# Patient Record
Sex: FEMALE | Race: WHITE | NOT HISPANIC OR LATINO | Employment: OTHER | URBAN - METROPOLITAN AREA
[De-identification: names, ages, dates, MRNs, and addresses within clinical notes are randomized per-mention and may not be internally consistent; named-entity substitution may affect disease eponyms.]

---

## 2018-07-09 ENCOUNTER — HOSPITAL ENCOUNTER (OUTPATIENT)
Dept: OTHER | Facility: HOSPITAL | Age: 48
Discharge: HOME | End: 2018-07-09
Attending: NURSE PRACTITIONER | Admitting: NURSE PRACTITIONER

## 2018-07-09 LAB
AMYLASE SERPL-CCNC: 61 U/L (ref 36–128)
CHOLEST SERPL-MCNC: 508 MG/DL
CHOLEST/HDLC SERPL: 6.7 {RATIO}
CONV LDL CHOLESTEROL DIRECT: 380 MG/DL (ref 0–100)
D DIMER PPP FEU-MCNC: 0.21 MG/L FEU (ref 0.17–0.59)
HDLC SERPL-MCNC: 75 MG/DL
LDLC/HDLC SERPL: 5 {RATIO}
LIPASE SERPL-CCNC: 31 U/L (ref 22–51)
LIPID INTERPRETATION: ABNORMAL
TRIGL SERPL-MCNC: 406 MG/DL
URATE SERPL-MCNC: 10.1 MG/DL (ref 2.6–8)
VLDLC SERPL CALC-MCNC: 52.5 MG/DL

## 2018-07-10 ENCOUNTER — HOSPITAL ENCOUNTER (OUTPATIENT)
Dept: CT IMAGING | Facility: HOSPITAL | Age: 48
Discharge: HOME | End: 2018-07-10
Attending: NURSE PRACTITIONER | Admitting: NURSE PRACTITIONER

## 2018-07-12 ENCOUNTER — HOSPITAL ENCOUNTER (OUTPATIENT)
Dept: CARDIOLOGY | Facility: HOSPITAL | Age: 48
Discharge: HOME | End: 2018-07-12
Attending: NURSE PRACTITIONER | Admitting: NURSE PRACTITIONER

## 2019-08-07 ENCOUNTER — TRANSCRIBE ORDERS (OUTPATIENT)
Dept: ADMINISTRATIVE | Facility: HOSPITAL | Age: 49
End: 2019-08-07

## 2019-08-07 DIAGNOSIS — Z12.31 VISIT FOR SCREENING MAMMOGRAM: Primary | ICD-10-CM

## 2019-11-07 PROBLEM — E11.9 TYPE 2 DIABETES MELLITUS (HCC): Status: ACTIVE | Noted: 2019-11-07

## 2019-11-07 PROBLEM — E03.9 HYPOTHYROIDISM: Status: ACTIVE | Noted: 2019-11-07

## 2019-11-07 PROBLEM — I10 HYPERTENSION: Status: ACTIVE | Noted: 2019-11-07

## 2021-05-24 ENCOUNTER — HOSPITAL ENCOUNTER (EMERGENCY)
Facility: HOSPITAL | Age: 51
Discharge: HOME | End: 2021-05-24
Attending: EMERGENCY MEDICINE | Admitting: EMERGENCY MEDICINE

## 2021-05-24 VITALS
TEMPERATURE: 98.1 F | BODY MASS INDEX: 36.88 KG/M2 | WEIGHT: 200.4 LBS | DIASTOLIC BLOOD PRESSURE: 85 MMHG | SYSTOLIC BLOOD PRESSURE: 148 MMHG | RESPIRATION RATE: 16 BRPM | HEART RATE: 87 BPM | OXYGEN SATURATION: 98 % | HEIGHT: 62 IN

## 2021-05-24 DIAGNOSIS — E86.0 DEHYDRATION: Primary | ICD-10-CM

## 2021-05-24 LAB
ALBUMIN SERPL-MCNC: 3.9 G/DL (ref 3.5–5.2)
ALBUMIN/GLOB SERPL: 1.1 G/DL
ALP SERPL-CCNC: 92 U/L (ref 39–117)
ALT SERPL W P-5'-P-CCNC: 18 U/L (ref 1–33)
ANION GAP SERPL CALCULATED.3IONS-SCNC: 13 MMOL/L (ref 5–15)
AST SERPL-CCNC: 14 U/L (ref 1–32)
BASOPHILS # BLD AUTO: 0.1 10*3/MM3 (ref 0–0.2)
BASOPHILS NFR BLD AUTO: 0.6 % (ref 0–1.5)
BILIRUB SERPL-MCNC: 0.3 MG/DL (ref 0–1.2)
BUN SERPL-MCNC: 13 MG/DL (ref 6–20)
BUN/CREAT SERPL: 26 (ref 7–25)
CALCIUM SPEC-SCNC: 9.5 MG/DL (ref 8.6–10.5)
CHLORIDE SERPL-SCNC: 98 MMOL/L (ref 98–107)
CO2 SERPL-SCNC: 26 MMOL/L (ref 22–29)
CREAT SERPL-MCNC: 0.5 MG/DL (ref 0.57–1)
DEPRECATED RDW RBC AUTO: 40.3 FL (ref 37–54)
EOSINOPHIL # BLD AUTO: 0.2 10*3/MM3 (ref 0–0.4)
EOSINOPHIL NFR BLD AUTO: 1.8 % (ref 0.3–6.2)
ERYTHROCYTE [DISTWIDTH] IN BLOOD BY AUTOMATED COUNT: 13.4 % (ref 12.3–15.4)
GFR SERPL CREATININE-BSD FRML MDRD: 130 ML/MIN/1.73
GLOBULIN UR ELPH-MCNC: 3.6 GM/DL
GLUCOSE SERPL-MCNC: 213 MG/DL (ref 65–99)
HCT VFR BLD AUTO: 43.7 % (ref 34–46.6)
HGB BLD-MCNC: 14.8 G/DL (ref 12–15.9)
LIPASE SERPL-CCNC: 28 U/L (ref 13–60)
LYMPHOCYTES # BLD AUTO: 3 10*3/MM3 (ref 0.7–3.1)
LYMPHOCYTES NFR BLD AUTO: 29.8 % (ref 19.6–45.3)
MAGNESIUM SERPL-MCNC: 1.7 MG/DL (ref 1.6–2.6)
MCH RBC QN AUTO: 29.4 PG (ref 26.6–33)
MCHC RBC AUTO-ENTMCNC: 33.9 G/DL (ref 31.5–35.7)
MCV RBC AUTO: 86.7 FL (ref 79–97)
MONOCYTES # BLD AUTO: 0.6 10*3/MM3 (ref 0.1–0.9)
MONOCYTES NFR BLD AUTO: 6 % (ref 5–12)
NEUTROPHILS NFR BLD AUTO: 6.3 10*3/MM3 (ref 1.7–7)
NEUTROPHILS NFR BLD AUTO: 61.8 % (ref 42.7–76)
NRBC BLD AUTO-RTO: 0.1 /100 WBC (ref 0–0.2)
PLATELET # BLD AUTO: 282 10*3/MM3 (ref 140–450)
PMV BLD AUTO: 8.5 FL (ref 6–12)
POTASSIUM SERPL-SCNC: 3.9 MMOL/L (ref 3.5–5.2)
PROT SERPL-MCNC: 7.5 G/DL (ref 6–8.5)
RBC # BLD AUTO: 5.05 10*6/MM3 (ref 3.77–5.28)
SODIUM SERPL-SCNC: 137 MMOL/L (ref 136–145)
TROPONIN T SERPL-MCNC: <0.01 NG/ML (ref 0–0.03)
WBC # BLD AUTO: 10.1 10*3/MM3 (ref 3.4–10.8)

## 2021-05-24 PROCEDURE — 93005 ELECTROCARDIOGRAM TRACING: CPT | Performed by: EMERGENCY MEDICINE

## 2021-05-24 PROCEDURE — 83735 ASSAY OF MAGNESIUM: CPT | Performed by: EMERGENCY MEDICINE

## 2021-05-24 PROCEDURE — 83690 ASSAY OF LIPASE: CPT | Performed by: EMERGENCY MEDICINE

## 2021-05-24 PROCEDURE — 85025 COMPLETE CBC W/AUTO DIFF WBC: CPT | Performed by: EMERGENCY MEDICINE

## 2021-05-24 PROCEDURE — 84484 ASSAY OF TROPONIN QUANT: CPT | Performed by: EMERGENCY MEDICINE

## 2021-05-24 PROCEDURE — 96374 THER/PROPH/DIAG INJ IV PUSH: CPT

## 2021-05-24 PROCEDURE — 80053 COMPREHEN METABOLIC PANEL: CPT | Performed by: EMERGENCY MEDICINE

## 2021-05-24 PROCEDURE — 99283 EMERGENCY DEPT VISIT LOW MDM: CPT

## 2021-05-24 PROCEDURE — 25010000002 ONDANSETRON PER 1 MG: Performed by: EMERGENCY MEDICINE

## 2021-05-24 RX ORDER — SODIUM CHLORIDE 0.9 % (FLUSH) 0.9 %
10 SYRINGE (ML) INJECTION AS NEEDED
Status: DISCONTINUED | OUTPATIENT
Start: 2021-05-24 | End: 2021-05-24 | Stop reason: HOSPADM

## 2021-05-24 RX ORDER — ONDANSETRON 2 MG/ML
4 INJECTION INTRAMUSCULAR; INTRAVENOUS ONCE
Status: COMPLETED | OUTPATIENT
Start: 2021-05-24 | End: 2021-05-24

## 2021-05-24 RX ADMIN — SODIUM CHLORIDE 1000 ML: 0.9 INJECTION, SOLUTION INTRAVENOUS at 16:21

## 2021-05-24 RX ADMIN — ONDANSETRON 4 MG: 2 INJECTION INTRAMUSCULAR; INTRAVENOUS at 16:22

## 2021-05-24 NOTE — DISCHARGE INSTRUCTIONS
Rest, drink plenty of fluids, follow-up with your doctor this week for recheck.  Return for vomiting, fever, shortness of breath, chest pain, abdominal pain or any other concerns

## 2021-05-24 NOTE — ED NOTES
"Pt c/o nausea and states she has not felt right since her covid shot. States \" I'm dehydrated\"     Jimena Hua RN  05/24/21 8919    "

## 2021-05-25 LAB — QT INTERVAL: 377 MS

## 2023-07-31 ENCOUNTER — HOSPITAL ENCOUNTER (INPATIENT)
Facility: HOSPITAL | Age: 53
LOS: 2 days | Discharge: HOME | DRG: 897 | End: 2023-08-02
Attending: EMERGENCY MEDICINE
Payer: MEDICARE

## 2023-07-31 ENCOUNTER — HOSPITAL ENCOUNTER (EMERGENCY)
Facility: HOSPITAL | Age: 53
Discharge: HOME | DRG: 897 | End: 2023-07-31
Attending: EMERGENCY MEDICINE | Admitting: EMERGENCY MEDICINE
Payer: MEDICARE

## 2023-07-31 ENCOUNTER — APPOINTMENT (OUTPATIENT)
Dept: CT IMAGING | Facility: HOSPITAL | Age: 53
DRG: 897 | End: 2023-07-31
Payer: MEDICARE

## 2023-07-31 VITALS
HEIGHT: 62 IN | OXYGEN SATURATION: 94 % | DIASTOLIC BLOOD PRESSURE: 97 MMHG | HEART RATE: 88 BPM | BODY MASS INDEX: 36.65 KG/M2 | RESPIRATION RATE: 18 BRPM | TEMPERATURE: 98.9 F | SYSTOLIC BLOOD PRESSURE: 160 MMHG

## 2023-07-31 DIAGNOSIS — R41.82 ALTERED MENTAL STATUS, UNSPECIFIED ALTERED MENTAL STATUS TYPE: Primary | ICD-10-CM

## 2023-07-31 DIAGNOSIS — F15.10 METHAMPHETAMINE ABUSE: Primary | ICD-10-CM

## 2023-07-31 DIAGNOSIS — F22 PARANOID BEHAVIOR: ICD-10-CM

## 2023-07-31 DIAGNOSIS — F15.10 METHAMPHETAMINE ABUSE: ICD-10-CM

## 2023-07-31 DIAGNOSIS — R41.82 ALTERED MENTAL STATUS, UNSPECIFIED ALTERED MENTAL STATUS TYPE: ICD-10-CM

## 2023-07-31 PROBLEM — R41.0 DELIRIUM: Status: ACTIVE | Noted: 2023-07-31

## 2023-07-31 LAB
ALBUMIN SERPL-MCNC: 4.4 G/DL (ref 3.5–5.2)
ALBUMIN SERPL-MCNC: 4.6 G/DL (ref 3.5–5.2)
ALBUMIN/GLOB SERPL: 1.4 G/DL
ALBUMIN/GLOB SERPL: 1.4 G/DL
ALP SERPL-CCNC: 72 U/L (ref 39–117)
ALP SERPL-CCNC: 74 U/L (ref 39–117)
ALT SERPL W P-5'-P-CCNC: 13 U/L (ref 1–33)
ALT SERPL W P-5'-P-CCNC: 14 U/L (ref 1–33)
AMPHET+METHAMPHET UR QL: POSITIVE
ANION GAP SERPL CALCULATED.3IONS-SCNC: 11 MMOL/L (ref 5–15)
ANION GAP SERPL CALCULATED.3IONS-SCNC: 14 MMOL/L (ref 5–15)
ANION GAP SERPL CALCULATED.3IONS-SCNC: 14 MMOL/L (ref 5–15)
APAP SERPL-MCNC: <5 MCG/ML (ref 0–30)
AST SERPL-CCNC: 14 U/L (ref 1–32)
AST SERPL-CCNC: 16 U/L (ref 1–32)
BACTERIA UR QL AUTO: ABNORMAL /HPF
BARBITURATES UR QL SCN: NEGATIVE
BASOPHILS # BLD AUTO: 0.1 10*3/MM3 (ref 0–0.2)
BASOPHILS # BLD AUTO: 0.2 10*3/MM3 (ref 0–0.2)
BASOPHILS NFR BLD AUTO: 0.5 % (ref 0–1.5)
BASOPHILS NFR BLD AUTO: 1.4 % (ref 0–1.5)
BENZODIAZ UR QL SCN: NEGATIVE
BILIRUB SERPL-MCNC: 0.5 MG/DL (ref 0–1.2)
BILIRUB SERPL-MCNC: 0.7 MG/DL (ref 0–1.2)
BILIRUB UR QL STRIP: NEGATIVE
BUN SERPL-MCNC: 14 MG/DL (ref 6–20)
BUN SERPL-MCNC: 16 MG/DL (ref 6–20)
BUN SERPL-MCNC: 19 MG/DL (ref 6–20)
BUN/CREAT SERPL: 24.2 (ref 7–25)
BUN/CREAT SERPL: 25 (ref 7–25)
BUN/CREAT SERPL: 25.7 (ref 7–25)
CALCIUM SPEC-SCNC: 8.5 MG/DL (ref 8.6–10.5)
CALCIUM SPEC-SCNC: 9.1 MG/DL (ref 8.6–10.5)
CALCIUM SPEC-SCNC: 9.3 MG/DL (ref 8.6–10.5)
CANNABINOIDS SERPL QL: NEGATIVE
CHLORIDE SERPL-SCNC: 101 MMOL/L (ref 98–107)
CHLORIDE SERPL-SCNC: 102 MMOL/L (ref 98–107)
CHLORIDE SERPL-SCNC: 104 MMOL/L (ref 98–107)
CLARITY UR: CLEAR
CO2 SERPL-SCNC: 24 MMOL/L (ref 22–29)
COCAINE UR QL: NEGATIVE
COLOR UR: ABNORMAL
CREAT SERPL-MCNC: 0.56 MG/DL (ref 0.57–1)
CREAT SERPL-MCNC: 0.66 MG/DL (ref 0.57–1)
CREAT SERPL-MCNC: 0.74 MG/DL (ref 0.57–1)
DEPRECATED RDW RBC AUTO: 42.4 FL (ref 37–54)
DEPRECATED RDW RBC AUTO: 42.9 FL (ref 37–54)
EGFRCR SERPLBLD CKD-EPI 2021: 103.7 ML/MIN/1.73
EGFRCR SERPLBLD CKD-EPI 2021: 107.9 ML/MIN/1.73
EGFRCR SERPLBLD CKD-EPI 2021: 95.7 ML/MIN/1.73
EOSINOPHIL # BLD AUTO: 0.1 10*3/MM3 (ref 0–0.4)
EOSINOPHIL # BLD AUTO: 0.1 10*3/MM3 (ref 0–0.4)
EOSINOPHIL NFR BLD AUTO: 0.5 % (ref 0.3–6.2)
EOSINOPHIL NFR BLD AUTO: 0.9 % (ref 0.3–6.2)
ERYTHROCYTE [DISTWIDTH] IN BLOOD BY AUTOMATED COUNT: 13.7 % (ref 12.3–15.4)
ERYTHROCYTE [DISTWIDTH] IN BLOOD BY AUTOMATED COUNT: 13.8 % (ref 12.3–15.4)
ETHANOL UR QL: <0.01 %
GLOBULIN UR ELPH-MCNC: 3.2 GM/DL
GLOBULIN UR ELPH-MCNC: 3.2 GM/DL
GLUCOSE SERPL-MCNC: 165 MG/DL (ref 65–99)
GLUCOSE SERPL-MCNC: 173 MG/DL (ref 65–99)
GLUCOSE SERPL-MCNC: 239 MG/DL (ref 65–99)
GLUCOSE UR STRIP-MCNC: ABNORMAL MG/DL
HCT VFR BLD AUTO: 41.4 % (ref 34–46.6)
HCT VFR BLD AUTO: 42.2 % (ref 34–46.6)
HGB BLD-MCNC: 13.9 G/DL (ref 12–15.9)
HGB BLD-MCNC: 13.9 G/DL (ref 12–15.9)
HGB UR QL STRIP.AUTO: NEGATIVE
HOLD SPECIMEN: NORMAL
HOLD SPECIMEN: NORMAL
HYALINE CASTS UR QL AUTO: ABNORMAL /LPF
KETONES UR QL STRIP: ABNORMAL
LEUKOCYTE ESTERASE UR QL STRIP.AUTO: ABNORMAL
LYMPHOCYTES # BLD AUTO: 4.2 10*3/MM3 (ref 0.7–3.1)
LYMPHOCYTES # BLD AUTO: 4.3 10*3/MM3 (ref 0.7–3.1)
LYMPHOCYTES NFR BLD AUTO: 37 % (ref 19.6–45.3)
LYMPHOCYTES NFR BLD AUTO: 39.5 % (ref 19.6–45.3)
MAGNESIUM SERPL-MCNC: 1.9 MG/DL (ref 1.6–2.6)
MAGNESIUM SERPL-MCNC: 1.9 MG/DL (ref 1.6–2.6)
MCH RBC QN AUTO: 29.7 PG (ref 26.6–33)
MCH RBC QN AUTO: 29.8 PG (ref 26.6–33)
MCHC RBC AUTO-ENTMCNC: 33 G/DL (ref 31.5–35.7)
MCHC RBC AUTO-ENTMCNC: 33.5 G/DL (ref 31.5–35.7)
MCV RBC AUTO: 88.6 FL (ref 79–97)
MCV RBC AUTO: 90.2 FL (ref 79–97)
METHADONE UR QL SCN: NEGATIVE
MONOCYTES # BLD AUTO: 0.8 10*3/MM3 (ref 0.1–0.9)
MONOCYTES # BLD AUTO: 0.8 10*3/MM3 (ref 0.1–0.9)
MONOCYTES NFR BLD AUTO: 6.7 % (ref 5–12)
MONOCYTES NFR BLD AUTO: 7.2 % (ref 5–12)
NEUTROPHILS NFR BLD AUTO: 5.6 10*3/MM3 (ref 1.7–7)
NEUTROPHILS NFR BLD AUTO: 51 % (ref 42.7–76)
NEUTROPHILS NFR BLD AUTO: 55.3 % (ref 42.7–76)
NEUTROPHILS NFR BLD AUTO: 6.2 10*3/MM3 (ref 1.7–7)
NITRITE UR QL STRIP: NEGATIVE
NRBC BLD AUTO-RTO: 0 /100 WBC (ref 0–0.2)
NRBC BLD AUTO-RTO: 0.2 /100 WBC (ref 0–0.2)
NT-PROBNP SERPL-MCNC: 64 PG/ML (ref 0–900)
OPIATES UR QL: NEGATIVE
OXYCODONE UR QL SCN: NEGATIVE
PH UR STRIP.AUTO: <=5 [PH] (ref 5–8)
PHOSPHATE SERPL-MCNC: 3.5 MG/DL (ref 2.5–4.5)
PLAT MORPH BLD: NORMAL
PLATELET # BLD AUTO: 245 10*3/MM3 (ref 140–450)
PLATELET # BLD AUTO: 284 10*3/MM3 (ref 140–450)
PMV BLD AUTO: 8.9 FL (ref 6–12)
PMV BLD AUTO: 9.3 FL (ref 6–12)
POTASSIUM SERPL-SCNC: 3.6 MMOL/L (ref 3.5–5.2)
POTASSIUM SERPL-SCNC: 3.7 MMOL/L (ref 3.5–5.2)
POTASSIUM SERPL-SCNC: 3.7 MMOL/L (ref 3.5–5.2)
PROT SERPL-MCNC: 7.6 G/DL (ref 6–8.5)
PROT SERPL-MCNC: 7.8 G/DL (ref 6–8.5)
PROT UR QL STRIP: ABNORMAL
RBC # BLD AUTO: 4.67 10*6/MM3 (ref 3.77–5.28)
RBC # BLD AUTO: 4.68 10*6/MM3 (ref 3.77–5.28)
RBC # UR STRIP: ABNORMAL /HPF
RBC MORPH BLD: NORMAL
REF LAB TEST METHOD: ABNORMAL
SALICYLATES SERPL-MCNC: <0.3 MG/DL
SODIUM SERPL-SCNC: 139 MMOL/L (ref 136–145)
SODIUM SERPL-SCNC: 139 MMOL/L (ref 136–145)
SODIUM SERPL-SCNC: 140 MMOL/L (ref 136–145)
SP GR UR STRIP: 1.03 (ref 1–1.03)
SQUAMOUS #/AREA URNS HPF: ABNORMAL /HPF
TSH SERPL DL<=0.05 MIU/L-ACNC: 30.32 UIU/ML (ref 0.27–4.2)
UROBILINOGEN UR QL STRIP: ABNORMAL
WBC # UR STRIP: ABNORMAL /HPF
WBC MORPH BLD: NORMAL
WBC NRBC COR # BLD: 11 10*3/MM3 (ref 3.4–10.8)
WBC NRBC COR # BLD: 11.2 10*3/MM3 (ref 3.4–10.8)
WHOLE BLOOD HOLD COAG: NORMAL
WHOLE BLOOD HOLD SPECIMEN: NORMAL

## 2023-07-31 PROCEDURE — 85007 BL SMEAR W/DIFF WBC COUNT: CPT | Performed by: PHYSICIAN ASSISTANT

## 2023-07-31 PROCEDURE — 83735 ASSAY OF MAGNESIUM: CPT | Performed by: INTERNAL MEDICINE

## 2023-07-31 PROCEDURE — 82077 ASSAY SPEC XCP UR&BREATH IA: CPT | Performed by: EMERGENCY MEDICINE

## 2023-07-31 PROCEDURE — 80307 DRUG TEST PRSMV CHEM ANLYZR: CPT | Performed by: EMERGENCY MEDICINE

## 2023-07-31 PROCEDURE — 85025 COMPLETE CBC W/AUTO DIFF WBC: CPT | Performed by: PHYSICIAN ASSISTANT

## 2023-07-31 PROCEDURE — 84100 ASSAY OF PHOSPHORUS: CPT | Performed by: INTERNAL MEDICINE

## 2023-07-31 PROCEDURE — 85025 COMPLETE CBC W/AUTO DIFF WBC: CPT | Performed by: EMERGENCY MEDICINE

## 2023-07-31 PROCEDURE — 83880 ASSAY OF NATRIURETIC PEPTIDE: CPT | Performed by: INTERNAL MEDICINE

## 2023-07-31 PROCEDURE — 70450 CT HEAD/BRAIN W/O DYE: CPT

## 2023-07-31 PROCEDURE — 84443 ASSAY THYROID STIM HORMONE: CPT | Performed by: PHYSICIAN ASSISTANT

## 2023-07-31 PROCEDURE — 81001 URINALYSIS AUTO W/SCOPE: CPT | Performed by: EMERGENCY MEDICINE

## 2023-07-31 PROCEDURE — 99284 EMERGENCY DEPT VISIT MOD MDM: CPT

## 2023-07-31 PROCEDURE — 99285 EMERGENCY DEPT VISIT HI MDM: CPT

## 2023-07-31 PROCEDURE — 80053 COMPREHEN METABOLIC PANEL: CPT | Performed by: EMERGENCY MEDICINE

## 2023-07-31 PROCEDURE — 80143 DRUG ASSAY ACETAMINOPHEN: CPT | Performed by: EMERGENCY MEDICINE

## 2023-07-31 PROCEDURE — 80179 DRUG ASSAY SALICYLATE: CPT | Performed by: EMERGENCY MEDICINE

## 2023-07-31 PROCEDURE — 83735 ASSAY OF MAGNESIUM: CPT | Performed by: EMERGENCY MEDICINE

## 2023-07-31 RX ORDER — NITROGLYCERIN 0.4 MG/1
0.4 TABLET SUBLINGUAL
Status: DISCONTINUED | OUTPATIENT
Start: 2023-07-31 | End: 2023-08-02 | Stop reason: HOSPADM

## 2023-07-31 RX ORDER — SODIUM CHLORIDE 0.9 % (FLUSH) 0.9 %
10 SYRINGE (ML) INJECTION AS NEEDED
Status: DISCONTINUED | OUTPATIENT
Start: 2023-07-31 | End: 2023-07-31 | Stop reason: HOSPADM

## 2023-07-31 RX ORDER — SODIUM CHLORIDE 9 MG/ML
100 INJECTION, SOLUTION INTRAVENOUS CONTINUOUS
Status: DISPENSED | OUTPATIENT
Start: 2023-07-31 | End: 2023-08-01

## 2023-07-31 RX ORDER — POLYETHYLENE GLYCOL 3350 17 G/17G
17 POWDER, FOR SOLUTION ORAL DAILY PRN
Status: DISCONTINUED | OUTPATIENT
Start: 2023-07-31 | End: 2023-08-02 | Stop reason: HOSPADM

## 2023-07-31 RX ORDER — BISACODYL 10 MG
10 SUPPOSITORY, RECTAL RECTAL DAILY PRN
Status: DISCONTINUED | OUTPATIENT
Start: 2023-07-31 | End: 2023-08-02 | Stop reason: HOSPADM

## 2023-07-31 RX ORDER — DIAZEPAM 5 MG/1
5 TABLET ORAL ONCE
Status: DISCONTINUED | OUTPATIENT
Start: 2023-07-31 | End: 2023-07-31

## 2023-07-31 RX ORDER — AMOXICILLIN 250 MG
2 CAPSULE ORAL 2 TIMES DAILY
Status: DISCONTINUED | OUTPATIENT
Start: 2023-07-31 | End: 2023-08-02 | Stop reason: HOSPADM

## 2023-07-31 RX ORDER — SODIUM CHLORIDE 0.9 % (FLUSH) 0.9 %
10 SYRINGE (ML) INJECTION AS NEEDED
Status: DISCONTINUED | OUTPATIENT
Start: 2023-07-31 | End: 2023-08-02 | Stop reason: HOSPADM

## 2023-07-31 RX ORDER — HYDROCODONE BITARTRATE AND ACETAMINOPHEN 5; 325 MG/1; MG/1
1 TABLET ORAL EVERY 4 HOURS PRN
Status: DISCONTINUED | OUTPATIENT
Start: 2023-07-31 | End: 2023-08-02 | Stop reason: HOSPADM

## 2023-07-31 RX ORDER — BISACODYL 5 MG/1
5 TABLET, DELAYED RELEASE ORAL DAILY PRN
Status: DISCONTINUED | OUTPATIENT
Start: 2023-07-31 | End: 2023-08-02 | Stop reason: HOSPADM

## 2023-07-31 RX ORDER — SODIUM CHLORIDE 0.9 % (FLUSH) 0.9 %
10 SYRINGE (ML) INJECTION EVERY 12 HOURS SCHEDULED
Status: DISCONTINUED | OUTPATIENT
Start: 2023-07-31 | End: 2023-08-02 | Stop reason: HOSPADM

## 2023-07-31 RX ORDER — LEVOTHYROXINE SODIUM 0.05 MG/1
50 TABLET ORAL
Status: DISCONTINUED | OUTPATIENT
Start: 2023-08-01 | End: 2023-08-02 | Stop reason: HOSPADM

## 2023-07-31 RX ORDER — PANTOPRAZOLE SODIUM 40 MG/1
40 TABLET, DELAYED RELEASE ORAL
Status: DISCONTINUED | OUTPATIENT
Start: 2023-08-01 | End: 2023-08-02

## 2023-07-31 RX ORDER — RISPERIDONE 0.25 MG/1
0.5 TABLET ORAL EVERY 12 HOURS SCHEDULED
Status: DISCONTINUED | OUTPATIENT
Start: 2023-07-31 | End: 2023-08-01

## 2023-07-31 RX ORDER — SODIUM CHLORIDE 9 MG/ML
40 INJECTION, SOLUTION INTRAVENOUS AS NEEDED
Status: DISCONTINUED | OUTPATIENT
Start: 2023-07-31 | End: 2023-08-02 | Stop reason: HOSPADM

## 2023-07-31 RX ADMIN — SODIUM CHLORIDE 100 ML/HR: 9 INJECTION, SOLUTION INTRAVENOUS at 20:42

## 2023-07-31 RX ADMIN — SODIUM CHLORIDE 1000 ML: 9 INJECTION, SOLUTION INTRAVENOUS at 16:06

## 2023-07-31 NOTE — ED NOTES
"Secondary nurse attempted to hook patient up to cardiac monitor. Patient not willing to cooperate. When nurse went to place pulse oxy patient yelled at nurse \"don't fucking touch me!\" Nurse educated patient that we need to place her on the monitor and get some vitals on her for triage, Patient started to get upset stating \"I'm not fucking stupid, ya'll think I am but I'm not! You can't even tell me what I'm doing here\" When nurse restated how she was brought back to ER and the physical condition she was in, patient called nurse a liar. ER tech called to bedside for assistance in getting vitals.  "

## 2023-07-31 NOTE — ED NOTES
Provider at bedside and reports patient can  have a drink, patient reports she would not like a fountain drink and wants a drink that she can open herself. Patient given a can of umair but patient was unable to open it herself. RN offered to help patient open can but patient declined the help.

## 2023-07-31 NOTE — ED NOTES
"Patient asking specifically for a \"can of soda\" nurse states that she can get up a cup of soda. Patient started crying stating \"forget it!, I don't want it in a cup\" nurse concerned about patient safety with having a can. She denies any SI/HI but seems emotionally unstable at this time. Patient offered several different beverages but patient only wants a can of coke.  "

## 2023-07-31 NOTE — ED NOTES
RN at bedside, patient sleeping when RN entered room. Patient reported pain in right foot, walking barefoot a lot and stepping on nails. Patient reports she has a history of diabetes. Patient reports she has  a headache. Patient given socks for feet, patient given a clean pillow. RN attempted to start IV access in patient's left arm. Patient agreed to obtain blood samples. Another RN at bedside to help patient hold her arm still. RN able to obtain blood sample but IV unable to thread in to vein. Site dressed with gauze. Patient tearful and had been moving arm erratically during IV insertion attempt. Patient offered emotional support.

## 2023-07-31 NOTE — Clinical Note
Level of Care: Med/Surg [1]   Admitting Physician: ALFONSO SANCHEZ [[id number]]   Attending Physician: ALFONSO SANCHEZ [[id number]]

## 2023-07-31 NOTE — DISCHARGE INSTRUCTIONS
Tylenol as needed for pain or headache.    Stop using methamphetamines  Drink plenty of fluids    Follow-up with primary care for recheck    Return to the ER for new or worsening symptoms

## 2023-07-31 NOTE — ED PROVIDER NOTES
"Subjective   History of Present Illness  Chief Complaint: Altered mental status    Patient is a 53-year-old  female history of methamphetamine use presents to the ER acutely confused.  Patient was seen earlier today for altered mental status, paranoia thought to be secondary to methamphetamine use but was discharged.  Patient was seen by this provider at that time, she at that time she was alert and oriented x4, answer questions appropriately, respond to verbal commands and was able to ambulate with non-ataxic gait.  Patient was discharged completely clothed, but was then found a few hours later in the surgical department with only a towel wrapped around her otherwise completely naked.  When asked why the patient was in the surgical department she states \"I do not know\".  When asked what she was doing she states \"I did not do what you accused me of doing\".  When asked if she left the hospital before finding her way to the surgical department she states \"what's it to you I do not need to tell you that\".  Patient then crosses her arms sits on the exam stretcher and does not look at me.  She does not complain of any pain.  She is in no acute respiratory distress.    PCP: John Martino    History provided by:  Patient    Review of Systems   Constitutional:  Negative for chills and fever.   HENT:  Negative for sore throat and trouble swallowing.    Eyes: Negative.    Respiratory:  Negative for shortness of breath and wheezing.    Cardiovascular:  Negative for chest pain.   Gastrointestinal:  Negative for abdominal pain, diarrhea, nausea and vomiting.   Endocrine: Negative.    Genitourinary:  Negative for dysuria.   Musculoskeletal:  Negative for myalgias.   Skin:  Negative for rash.   Allergic/Immunologic: Negative.    Neurological:  Negative for headaches.   Psychiatric/Behavioral:  Positive for agitation and confusion. Negative for behavioral problems.    All other systems reviewed and are negative.    No past " medical history on file.    Allergies   Allergen Reactions    Cephalexin Unknown (See Comments)    Ibuprofen Unknown (See Comments)       No past surgical history on file.    No family history on file.    Social History     Socioeconomic History    Marital status:            Objective   Physical Exam  Vitals and nursing note reviewed.   Constitutional:       Appearance: Normal appearance. She is well-developed and normal weight. She is not ill-appearing or toxic-appearing.   HENT:      Head: Normocephalic and atraumatic.      Mouth/Throat:      Mouth: Mucous membranes are moist.   Eyes:      Extraocular Movements: Extraocular movements intact.      Right eye: Normal extraocular motion and no nystagmus.      Left eye: Normal extraocular motion and no nystagmus.      Pupils: Pupils are equal, round, and reactive to light.   Cardiovascular:      Rate and Rhythm: Normal rate and regular rhythm.      Heart sounds: Normal heart sounds.   Pulmonary:      Effort: Pulmonary effort is normal. No respiratory distress.      Breath sounds: Normal breath sounds. No wheezing.   Abdominal:      General: Bowel sounds are normal. There is no distension.      Palpations: Abdomen is soft.      Tenderness: There is no abdominal tenderness.   Musculoskeletal:         General: Normal range of motion.   Skin:     General: Skin is warm and dry.      Capillary Refill: Capillary refill takes less than 2 seconds.      Findings: No rash.   Neurological:      Mental Status: She is alert and oriented to person, place, and time. She is confused.      GCS: GCS eye subscore is 4. GCS verbal subscore is 5. GCS motor subscore is 6.      Cranial Nerves: No cranial nerve deficit or facial asymmetry.   Psychiatric:         Attention and Perception: She does not perceive auditory hallucinations.         Mood and Affect: Mood normal.         Speech: Speech normal.         Behavior: Behavior normal. Behavior is agitated.         Thought Content:  "Thought content is paranoid.       Procedures           ED Course  ED Course as of 07/31/23 1805 Mon Jul 31, 2023   1251 Patient was brought back to the emergency room after being found wandering the surgical hallways only in a towel.  When asked her what happened patient became agitated and stated that \"she did not do what we accused her of doing\".  I asked the patient why she went to the surgical wing and she states \"I do not know\".  I asked the patient if she left the hospital first before returning to the hospital and she states \"that is none of your business\". []   1511 I spoke with Dr. Meyer regarding admission. He requested Geodon IM ordered. Order placede []      ED Course User Index  [] Jimena Whitehead PA    /78   Pulse 97   Temp 98.9 øF (37.2 øC)   Resp 20   Ht 157.5 cm (62\")   Wt 90.9 kg (200 lb 6.4 oz)   SpO2 97%   BMI 36.65 kg/mý   Labs Reviewed   COMPREHENSIVE METABOLIC PANEL - Abnormal; Notable for the following components:       Result Value    Glucose 173 (*)     All other components within normal limits    Narrative:     GFR Normal >60  Chronic Kidney Disease <60  Kidney Failure <15     CBC WITH AUTO DIFFERENTIAL - Abnormal; Notable for the following components:    WBC 11.00 (*)     Lymphocytes, Absolute 4.30 (*)     All other components within normal limits   TSH - Abnormal; Notable for the following components:    TSH 30.320 (*)     All other components within normal limits   SCAN SLIDE - Normal    Narrative:     Slide Reviewed     CBC AND DIFFERENTIAL    Narrative:     The following orders were created for panel order CBC & Differential.  Procedure                               Abnormality         Status                     ---------                               -----------         ------                     CBC Auto Differential[[id number]]        Abnormal            Final result               Scan Slide[[id number]]                   Normal              Final result       "           Please view results for these tests on the individual orders.     Medications   sodium chloride 0.9 % flush 10 mL (has no administration in time range)   ziprasidone (GEODON) 10 mg in sterile water (preservative free) 0.5 mL injection (has no administration in time range)   sodium chloride 0.9 % bolus 1,000 mL (0 mL Intravenous Stopped 7/31/23 1735)     CT Head Without Contrast    Result Date: 7/31/2023  1.No acute intracranial abnormality is identified. 2.Periventricular white matter hypoattenuation may be related to chronic microvascular ischemic change. This is greater than expected given the patient's age. Follow-up MRI may be considered if clinically indicated. Electronically Signed: Timothy Rodrigeuz  7/31/2023 7:58 AM EDT  Workstation ID: [id number]                                          Medical Decision Making  Differential Dx (Includes but not limited to): Schizophrenia, paranoia, intoxication, methamphetamine abuse, psychosis  Medical Records Reviewed: Patient seen earlier today for altered mental status, paranoia, discharged by this provider because she was alert and oriented x4 and requested discharge.  Labs: CBC no leukocytosis.  CMP glucose 173.  TSH 30  Imaging: Reviewed from earlier, CT head shows no acute intracranial hemorrhage or tumor  Telemetry: N/A  Testing considered but not ordered: Chest x-ray, no cough or shortness of breath  Nature of Complaint: Acute  Admission vs Discharge: Admission  Discussion: While in the ED IV was placed and labs were obtained appropriate PPE was worn during exam and throughout all encounters with the patient.  Patient had the above evaluation.  IV established, lab work obtained.  Patient is awake, alert, answers questions appropriately responds to verbal commands.  She is ambulatory with non-ataxic gait.  Patient does seem very paranoid, she will not drink any fluids from the start from cup because she believes that it has been poisoned.  She denies any  complaints of pain.  Patient will be admitted for altered mental status, paranoia.  I spoke with Dr. Sanchez who agreed to accept patient for admission and psychiatric consultation, requested order for Geodon.  I suspect patient's altered mental status and paranoia secondary to methamphetamine use.  Patient otherwise unremarkable ER stay.    Problems Addressed:  Altered mental status, unspecified altered mental status type: acute illness or injury  Methamphetamine abuse: acute illness or injury  Paranoid behavior: acute illness or injury    Amount and/or Complexity of Data Reviewed  External Data Reviewed: radiology and notes.  Labs: ordered. Decision-making details documented in ED Course.    Risk  Prescription drug management.  Decision regarding hospitalization.        Final diagnoses:   Altered mental status, unspecified altered mental status type   Methamphetamine abuse   Paranoid behavior       ED Disposition  ED Disposition       ED Disposition   Decision to Admit    Condition   --    Comment   Level of Care: Med/Surg [1]   Admitting Physician: ALFONSO SANCHEZ [[id number]]   Attending Physician: ALFONSO SANCHEZ [[id number]]                 No follow-up provider specified.       Medication List      No changes were made to your prescriptions during this visit.            Jimena Whitehead PA  07/31/23 2754

## 2023-07-31 NOTE — ED NOTES
Patient started coughing, RN entered room to assess patient. Patient continued to cough, bed sat up. Patient stopped coughing and RN listened to lung sounds which were clear and noted oxygen saturations to be 95% on room air. Patient was tearful and said 'everyone is messing with me' and reported 'i'm not messing with anyone and I don't want anyone messing with me.' Emotional support provided to patient and asked about needs. Patient tearful and reports being hungry and thirsty. RN offered can of soda but patient reported 'i just want to be left alone.' RN left room, patient on monitor and door open. Lights dimmed in room

## 2023-07-31 NOTE — ED PROVIDER NOTES
"Subjective   History of Present Illness  Chief complaint: Patient presents after EMS was called as patient was at a gas station accusing them of poisoning slushafia's that her brother was giving her.  She states that and admits to taking methamphetamine.  She states that she swallows it.  She states she swallowed \"3 pieces over the last day\".  She states she does methamphetamine frequently to take her mind off \"bad memories of my father\".  She denies suicidal ideation or homicidal ideation.  She states she is \"thirsty\".    Context:    Duration: As above    Timing:    Severity:    Associated Symptoms:        PCP:  LMP:    Review of Systems   Unable to perform ROS: Mental status change   Psychiatric/Behavioral:  Positive for agitation, behavioral problems, decreased concentration and hallucinations. Negative for self-injury and suicidal ideas.      No past medical history on file.    Allergies   Allergen Reactions    Cephalexin Unknown (See Comments)    Ibuprofen Unknown (See Comments)       No past surgical history on file.    No family history on file.    Social History     Socioeconomic History    Marital status:            Objective   Physical Exam  Vitals and nursing note reviewed.   Constitutional:       Comments: Patient is anxious and pressured and rapid with her speech   HENT:      Head: Normocephalic and atraumatic.   Cardiovascular:      Rate and Rhythm: Tachycardia present.      Pulses: Normal pulses.      Heart sounds: Normal heart sounds.   Pulmonary:      Effort: Pulmonary effort is normal.      Breath sounds: Normal breath sounds.   Abdominal:      Tenderness: There is no abdominal tenderness.   Musculoskeletal:         General: No swelling.      Cervical back: Normal range of motion and neck supple.   Neurological:      General: No focal deficit present.      Mental Status: She is alert.      Comments: Patient has some disorientation to situation and some fixed delusions but is oriented to time " What Type Of Note Output Would You Prefer (Optional)?: Bullet Format Hpi Title: Evaluation of Skin Lesions and place   Psychiatric:      Comments: Patient is tearful.  She is not suicidal.  She is not homicidal.  She is anxious.       Procedures           ED Course  ED Course as of 08/01/23 0817   Mon Jul 31, 2023   0751 Patient care assumed from Dr. Oglesby pending CT scan and an expected admission []   1036 Patient reevaluated, she is resting in the exam room.  Vital signs are stable.  Patient easily arousable and answers questions appropriately.  Her speech is clear.  Patient able to tell me her name, date of birth, she is alert and oriented to place and time.  She does not recall why she was brought to the ER but can tell when she is at the hospital and what the date is.  She does admit to history of methamphetamine use.  She moves all extremities independently without difficulty.  Neurological exam is grossly normal.  Discussed admission with patient at bedside, she adamantly states that she does not want to stay.  This was discussed with ER attending, Dr. Briggs, patient is alert and oriented x4 she has no focal neurological deficits.  Patient's altered mental status and previous hallucinations felt to be due to methamphetamine use, her urine drug screen was positive for this.  Patient is not currently hallucinating.  She denies SI or HI.  CT head shows no intracranial hemorrhage or acute stroke.  Again recommended admission or consultation for illicit drug detox, patient again refused and states that she wanted to be discharged.  I do not think that patient is a harm to herself or someone else at this time.  Strongly recommended methamphetamine cessation.  Patient verbalized understanding.  Patient will be discharged, encouraged methamphetamine cessation and follow-up with PCP as needed. [MC]      ED Course User Index  [] Jimena Whitehead PA      Results for orders placed or performed during the hospital encounter of 07/31/23   Comprehensive Metabolic Panel    Specimen: Blood   Result Value Ref Range     Glucose 239 (H) 65 - 99 mg/dL    BUN 19 6 - 20 mg/dL    Creatinine 0.74 0.57 - 1.00 mg/dL    Sodium 139 136 - 145 mmol/L    Potassium 3.7 3.5 - 5.2 mmol/L    Chloride 101 98 - 107 mmol/L    CO2 24.0 22.0 - 29.0 mmol/L    Calcium 9.3 8.6 - 10.5 mg/dL    Total Protein 7.8 6.0 - 8.5 g/dL    Albumin 4.6 3.5 - 5.2 g/dL    ALT (SGPT) 14 1 - 33 U/L    AST (SGOT) 16 1 - 32 U/L    Alkaline Phosphatase 74 39 - 117 U/L    Total Bilirubin 0.5 0.0 - 1.2 mg/dL    Globulin 3.2 gm/dL    A/G Ratio 1.4 g/dL    BUN/Creatinine Ratio 25.7 (H) 7.0 - 25.0    Anion Gap 14.0 5.0 - 15.0 mmol/L    eGFR 95.7 >60.0 mL/min/1.73   Magnesium    Specimen: Blood   Result Value Ref Range    Magnesium 1.9 1.6 - 2.6 mg/dL   Salicylate Level    Specimen: Blood   Result Value Ref Range    Salicylate <0.3 <=30.0 mg/dL   Ethanol    Specimen: Blood   Result Value Ref Range    Ethanol % <0.010 %   Acetaminophen Level    Specimen: Blood   Result Value Ref Range    Acetaminophen <5.0 0.0 - 30.0 mcg/mL   Urinalysis With Culture If Indicated - Urine, Catheter    Specimen: Urine, Catheter   Result Value Ref Range    Color, UA Alexsandra (A) Yellow, Straw    Appearance, UA Clear Clear    pH, UA <=5.0 5.0 - 8.0    Specific Gravity, UA 1.033 (H) 1.005 - 1.030    Glucose,  mg/dL (Trace) (A) Negative    Ketones, UA Trace (A) Negative    Bilirubin, UA Negative Negative    Blood, UA Negative Negative    Protein, UA Trace (A) Negative    Leuk Esterase, UA Trace (A) Negative    Nitrite, UA Negative Negative    Urobilinogen, UA 1.0 E.U./dL 0.2 - 1.0 E.U./dL   CBC Auto Differential    Specimen: Blood   Result Value Ref Range    WBC 11.20 (H) 3.40 - 10.80 10*3/mm3    RBC 4.67 3.77 - 5.28 10*6/mm3    Hemoglobin 13.9 12.0 - 15.9 g/dL    Hematocrit 41.4 34.0 - 46.6 %    MCV 88.6 79.0 - 97.0 fL    MCH 29.7 26.6 - 33.0 pg    MCHC 33.5 31.5 - 35.7 g/dL    RDW 13.7 12.3 - 15.4 %    RDW-SD 42.4 37.0 - 54.0 fl    MPV 8.9 6.0 - 12.0 fL    Platelets 284 140 - 450 10*3/mm3     How Severe Are Your Spot(S)?: moderate Have Your Spot(S) Been Treated In The Past?: has not been treated Neutrophil % 55.3 42.7 - 76.0 %    Lymphocyte % 37.0 19.6 - 45.3 %    Monocyte % 6.7 5.0 - 12.0 %    Eosinophil % 0.5 0.3 - 6.2 %    Basophil % 0.5 0.0 - 1.5 %    Neutrophils, Absolute 6.20 1.70 - 7.00 10*3/mm3    Lymphocytes, Absolute 4.20 (H) 0.70 - 3.10 10*3/mm3    Monocytes, Absolute 0.80 0.10 - 0.90 10*3/mm3    Eosinophils, Absolute 0.10 0.00 - 0.40 10*3/mm3    Basophils, Absolute 0.10 0.00 - 0.20 10*3/mm3    nRBC 0.0 0.0 - 0.2 /100 WBC   Urinalysis, Microscopic Only - Urine, Catheter    Specimen: Urine, Catheter   Result Value Ref Range    RBC, UA 0-2 (A) None Seen /HPF    WBC, UA 0-2 (A) None Seen /HPF    Bacteria, UA None Seen None Seen /HPF    Squamous Epithelial Cells, UA 0-2 None Seen, 0-2 /HPF    Hyaline Casts, UA 0-2 None Seen /LPF    Methodology Automated Microscopy    Green Top (Gel)   Result Value Ref Range    Extra Tube Hold for add-ons.    Lavender Top   Result Value Ref Range    Extra Tube hold for add-on    Gold Top - SST   Result Value Ref Range    Extra Tube Hold for add-ons.    Light Blue Top   Result Value Ref Range    Extra Tube Hold for add-ons.                                             Medical Decision Making  Patient was seen and evaluated for altered mental status    Differential diagnosis includes was not limited to intracerebral hemorrhage, methamphetamine abuse, brain tumor, UTI    Patient's urinalysis did not show signs of UTI.  She does admit to methamphetamine use but she is hallucinating and disoriented at this point time.  CT scan brain pending at this time.  CITLALI Segundo to disposition patient with likely admission secondary to hallucinations and methamphetamine abuse.    Problems Addressed:  Altered mental status, unspecified altered mental status type: complicated acute illness or injury  Methamphetamine abuse: complicated acute illness or injury    Amount and/or Complexity of Data Reviewed  Labs: ordered. Decision-making details documented in ED Course.     Details:  Family Member: Mother Reviewed by myself  Radiology: ordered.    Risk  Prescription drug management.        Final diagnoses:   None     Methamphetamine abuse  Altered mental status  ED Disposition  ED Disposition       None            No follow-up provider specified.       Medication List      No changes were made to your prescriptions during this visit.            Amado Oglesby,   07/31/23 0715       Amado Oglesby DO  08/01/23 0818

## 2023-07-31 NOTE — H&P
"    [facility] Medicine Services      Patient Name: Simran Wall  : 1970  MRN: [id number]  Primary Care Physician:  John Martino MD  Date of admission: 2023    Subjective      Chief Complaint: Change in mental status    History of Present Illness: Simran Wall is a 53 y.o. female with history of methamphetamine use . who presented to [facility] ER on 2023 acutely confused, per patient\" went to gas station to get gas. I thought my car was going to blow up:   Patient was seen earlier today in ER for altered mental status, paranoia thought to be secondary to methamphetamine use but was discharged.  Patient was seen by this provider at that time, she at that time she was alert and oriented x4, answer questions appropriately, respond to verbal commands and was able to ambulate with non-ataxic gait.  Patient was discharged completely clothed, but was then found a few hours later in the surgical department with only a towel wrapped around her otherwise completely naked.  When asked why the patient was in the surgical department she states \"I do not know\".  When asked what she was doing she states \"I did not do what you accused me of doing\".  When asked if she left the hospital before finding her way to the surgical department she states \"what's it to you I do not need to tell you that\".  Patient then crosses her arms sits on the exam stretcher and does not look at me.  She does not complain of any pain.  She is in no acute respiratory distress.      CT Head Without Contrast-Result Date: 2023-.No acute intracranial abnormality is identified. 2.Periventricular white matter hypoattenuation may be related to chronic microvascular ischemic change. This is greater than expected given the patient's age. Follow-up MRI may be considered if clinically indicated. Electronically Signed: Timothy Rodriguez  2023 7:58 AM    PCP: John Martino     History provided by:  " Patient    Review of Systems   Constitutional: Positive for malaise/fatigue.   HENT: Negative.     Eyes: Negative.    Cardiovascular: Negative.    Respiratory: Negative.     Endocrine: Negative.    Hematologic/Lymphatic: Negative.    Skin: Negative.    Musculoskeletal: Negative.    Gastrointestinal: Negative.    Genitourinary: Negative.    Neurological: Negative.    Psychiatric/Behavioral:  Positive for altered mental status and substance abuse.    Allergic/Immunologic: Negative.    All other systems reviewed and are negative.     Personal History     No past medical history on file.    No past surgical history on file.    Family History: family history is not on file. Otherwise pertinent FHx was reviewed and not pertinent to current issue.    Social History:      Home Medications:  Prior to Admission Medications       None          Allergies:  Allergies   Allergen Reactions    Cephalexin Unknown (See Comments)    Ibuprofen Unknown (See Comments)       Objective      Vitals:   Temp:  [98.9 øF (37.2 øC)] 98.9 øF (37.2 øC)  Heart Rate:  [] 97  Resp:  [17-20] 20  BP: (125-174)/(65-97) 141/78    Physical Exam  Vitals and nursing note reviewed.   Constitutional:       General: She is not in acute distress.     Appearance: She is well-developed. She is obese. She is not ill-appearing, toxic-appearing or diaphoretic.   HENT:      Head: Normocephalic and atraumatic.      Nose: Nose normal. No congestion or rhinorrhea.      Mouth/Throat:      Mouth: Mucous membranes are moist.      Pharynx: No oropharyngeal exudate.   Eyes:      General: No scleral icterus.        Right eye: No discharge.         Left eye: No discharge.      Extraocular Movements: Extraocular movements intact.      Conjunctiva/sclera: Conjunctivae normal.      Pupils: Pupils are equal, round, and reactive to light.   Neck:      Thyroid: No thyromegaly.      Vascular: No carotid bruit or JVD.      Trachea: No tracheal deviation.   Cardiovascular:       Rate and Rhythm: Normal rate and regular rhythm.      Pulses: Normal pulses.      Heart sounds: Normal heart sounds. No murmur heard.    No friction rub. No gallop.   Pulmonary:      Effort: Pulmonary effort is normal. No respiratory distress.      Breath sounds: Normal breath sounds. No stridor. No wheezing, rhonchi or rales.   Chest:      Chest wall: No tenderness.   Abdominal:      General: Bowel sounds are normal. There is no distension.      Palpations: Abdomen is soft. There is no mass.      Tenderness: There is no abdominal tenderness. There is no guarding or rebound.      Hernia: No hernia is present.   Musculoskeletal:         General: No swelling, tenderness, deformity or signs of injury. Normal range of motion.      Cervical back: Normal range of motion and neck supple. No rigidity. No muscular tenderness.      Right lower leg: No edema.      Left lower leg: No edema.   Lymphadenopathy:      Cervical: No cervical adenopathy.   Skin:     General: Skin is warm and dry.      Coloration: Skin is not jaundiced or pale.      Findings: No bruising, erythema or rash.   Neurological:      General: No focal deficit present.      Mental Status: She is alert and oriented to person, place, and time.      Cranial Nerves: No cranial nerve deficit.      Sensory: No sensory deficit.      Motor: No weakness or abnormal muscle tone.      Coordination: Coordination normal.   Psychiatric:         Mood and Affect: Mood normal.        Result Review    Result Review:  I have personally reviewed the results from the time of this admission to 7/31/2023 18:22 EDT and agree with these findings:  []  Laboratory  []  Microbiology  []  Radiology  []  EKG/Telemetry   []  Cardiology/Vascular   []  Pathology  []  Old records  []  Other:  Most notable findings include: CMP:        Lab 07/31/23  1601 07/31/23  0526   SODIUM 140 139   POTASSIUM 3.6 3.7   CHLORIDE 102 101   CO2 24.0 24.0   ANION GAP 14.0 14.0   BUN 16 19   CREATININE 0.66  0.74   EGFR 103.7 95.7   GLUCOSE 173* 239*   CALCIUM 9.1 9.3   MAGNESIUM  --  1.9   TOTAL PROTEIN 7.6 7.8   ALBUMIN 4.4 4.6   GLOBULIN 3.2 3.2   ALT (SGPT) 13 14   AST (SGOT) 14 16   BILIRUBIN 0.7 0.5   ALK PHOS 72 74    CBC:      Lab 07/31/23  1402 07/31/23  0526   WBC 11.00* 11.20*   HEMOGLOBIN 13.9 13.9   HEMATOCRIT 42.2 41.4   PLATELETS 245 284   NEUTROS ABS 5.60 6.20   LYMPHS ABS 4.30* 4.20*   MONOS ABS 0.80 0.80   EOS ABS 0.10 0.10   MCV 90.2 88.6        Assessment & Plan        Active Hospital Problems:  Active Hospital Problems    Diagnosis     **Mental status alteration     Type 2 diabetes mellitus     Hypothyroidism     Hypertension      Change in mental status, Delirium -Substance abuse-methamphetamines, possible underlying depression  Plan:   -Admit to [facility]  -Psych consult.  -IV fluids.  -Risperdal twice daily  CT -1.No acute intracranial abnormality is identified.  2.Periventricular white matter hypoattenuation may be related to chronic microvascular ischemic change. This is greater than expected given the patient's age. Follow-up MRI may be considered if clinically indicated.     Leukocytosis. Likely reactive,  -Monitor CBC. Check procal  -UA negative for nitrite and bacteria, trace leukocytes    Hypertension-/72  -Add lisinopril 5 mg    Type 2 diabetes  -Insulin sliding scale  -Diabetic diet  -Glucometers ACH S    Hypothyroidism  -TSH-30  -Check T4  -Home meds not reviewed.  -Start Synthroid 50 mcg daily      DVT prophylaxis:  No DVT prophylaxis order currently exists.    CODE STATUS:       Admission Status:  I believe this patient meets admit status.    I discussed the patient's findings and my recommendations with patient and nursing staff.    This patient has been examined wearing appropriate Personal Protective Equipment and discussed with  rn . 07/31/23      Signature: Electronically signed by Andres Meyer MD, 07/31/23, 6:10 PM EDT.

## 2023-08-01 LAB
ALBUMIN SERPL-MCNC: 4.3 G/DL (ref 3.5–5.2)
ALBUMIN/GLOB SERPL: 1.4 G/DL
ALP SERPL-CCNC: 71 U/L (ref 39–117)
ALT SERPL W P-5'-P-CCNC: 14 U/L (ref 1–33)
ANION GAP SERPL CALCULATED.3IONS-SCNC: 11 MMOL/L (ref 5–15)
ANION GAP SERPL CALCULATED.3IONS-SCNC: 12 MMOL/L (ref 5–15)
AST SERPL-CCNC: 18 U/L (ref 1–32)
BILIRUB SERPL-MCNC: 0.6 MG/DL (ref 0–1.2)
BUN SERPL-MCNC: 11 MG/DL (ref 6–20)
BUN SERPL-MCNC: 13 MG/DL (ref 6–20)
BUN/CREAT SERPL: 17.1 (ref 7–25)
BUN/CREAT SERPL: 17.7 (ref 7–25)
CALCIUM SPEC-SCNC: 8.6 MG/DL (ref 8.6–10.5)
CALCIUM SPEC-SCNC: 8.9 MG/DL (ref 8.6–10.5)
CHLORIDE SERPL-SCNC: 103 MMOL/L (ref 98–107)
CHLORIDE SERPL-SCNC: 104 MMOL/L (ref 98–107)
CK SERPL-CCNC: 160 U/L (ref 20–180)
CO2 SERPL-SCNC: 25 MMOL/L (ref 22–29)
CO2 SERPL-SCNC: 25 MMOL/L (ref 22–29)
CREAT SERPL-MCNC: 0.62 MG/DL (ref 0.57–1)
CREAT SERPL-MCNC: 0.76 MG/DL (ref 0.57–1)
D-LACTATE SERPL-SCNC: 1.2 MMOL/L (ref 0.5–2)
EGFRCR SERPLBLD CKD-EPI 2021: 105.3 ML/MIN/1.73
EGFRCR SERPLBLD CKD-EPI 2021: 92.7 ML/MIN/1.73
FERRITIN SERPL-MCNC: 205.8 NG/ML (ref 13–150)
GLOBULIN UR ELPH-MCNC: 3.1 GM/DL
GLUCOSE BLDC GLUCOMTR-MCNC: 152 MG/DL (ref 70–105)
GLUCOSE BLDC GLUCOMTR-MCNC: 208 MG/DL (ref 70–105)
GLUCOSE SERPL-MCNC: 150 MG/DL (ref 65–99)
GLUCOSE SERPL-MCNC: 221 MG/DL (ref 65–99)
HBA1C MFR BLD: 9.6 % (ref 4.8–5.6)
MAGNESIUM SERPL-MCNC: 1.9 MG/DL (ref 1.6–2.6)
MAGNESIUM SERPL-MCNC: 2 MG/DL (ref 1.6–2.6)
PHOSPHATE SERPL-MCNC: 2.9 MG/DL (ref 2.5–4.5)
PHOSPHATE SERPL-MCNC: 3.2 MG/DL (ref 2.5–4.5)
POTASSIUM SERPL-SCNC: 3.6 MMOL/L (ref 3.5–5.2)
POTASSIUM SERPL-SCNC: 4.1 MMOL/L (ref 3.5–5.2)
PROT SERPL-MCNC: 7.4 G/DL (ref 6–8.5)
SODIUM SERPL-SCNC: 140 MMOL/L (ref 136–145)
SODIUM SERPL-SCNC: 140 MMOL/L (ref 136–145)
T4 FREE SERPL-MCNC: 1.14 NG/DL (ref 0.93–1.7)

## 2023-08-01 PROCEDURE — 83605 ASSAY OF LACTIC ACID: CPT | Performed by: INTERNAL MEDICINE

## 2023-08-01 PROCEDURE — 84100 ASSAY OF PHOSPHORUS: CPT | Performed by: INTERNAL MEDICINE

## 2023-08-01 PROCEDURE — 82550 ASSAY OF CK (CPK): CPT | Performed by: INTERNAL MEDICINE

## 2023-08-01 PROCEDURE — 83735 ASSAY OF MAGNESIUM: CPT | Performed by: INTERNAL MEDICINE

## 2023-08-01 PROCEDURE — 80053 COMPREHEN METABOLIC PANEL: CPT | Performed by: INTERNAL MEDICINE

## 2023-08-01 PROCEDURE — 36415 COLL VENOUS BLD VENIPUNCTURE: CPT | Performed by: INTERNAL MEDICINE

## 2023-08-01 PROCEDURE — 99222 1ST HOSP IP/OBS MODERATE 55: CPT | Performed by: PSYCHIATRY & NEUROLOGY

## 2023-08-01 PROCEDURE — 84439 ASSAY OF FREE THYROXINE: CPT | Performed by: INTERNAL MEDICINE

## 2023-08-01 PROCEDURE — 82728 ASSAY OF FERRITIN: CPT | Performed by: INTERNAL MEDICINE

## 2023-08-01 PROCEDURE — 82948 REAGENT STRIP/BLOOD GLUCOSE: CPT

## 2023-08-01 PROCEDURE — 83036 HEMOGLOBIN GLYCOSYLATED A1C: CPT | Performed by: INTERNAL MEDICINE

## 2023-08-01 PROCEDURE — 97161 PT EVAL LOW COMPLEX 20 MIN: CPT

## 2023-08-01 RX ORDER — LISINOPRIL 5 MG/1
5 TABLET ORAL
Status: DISCONTINUED | OUTPATIENT
Start: 2023-08-01 | End: 2023-08-02 | Stop reason: HOSPADM

## 2023-08-01 RX ORDER — INSULIN LISPRO 100 [IU]/ML
2-9 INJECTION, SOLUTION INTRAVENOUS; SUBCUTANEOUS
Status: DISCONTINUED | OUTPATIENT
Start: 2023-08-01 | End: 2023-08-02 | Stop reason: HOSPADM

## 2023-08-01 RX ORDER — IBUPROFEN 600 MG/1
1 TABLET ORAL
Status: DISCONTINUED | OUTPATIENT
Start: 2023-08-01 | End: 2023-08-02 | Stop reason: HOSPADM

## 2023-08-01 RX ORDER — ARIPIPRAZOLE 10 MG/1
5 TABLET ORAL NIGHTLY
Status: DISCONTINUED | OUTPATIENT
Start: 2023-08-01 | End: 2023-08-02 | Stop reason: HOSPADM

## 2023-08-01 RX ORDER — POTASSIUM CHLORIDE 20 MEQ/1
40 TABLET, EXTENDED RELEASE ORAL EVERY 4 HOURS
Status: DISPENSED | OUTPATIENT
Start: 2023-08-01 | End: 2023-08-01

## 2023-08-01 RX ORDER — DEXTROSE MONOHYDRATE 25 G/50ML
25 INJECTION, SOLUTION INTRAVENOUS
Status: DISCONTINUED | OUTPATIENT
Start: 2023-08-01 | End: 2023-08-02 | Stop reason: HOSPADM

## 2023-08-01 RX ORDER — RISPERIDONE 0.25 MG/1
0.5 TABLET ORAL 2 TIMES DAILY PRN
Status: DISCONTINUED | OUTPATIENT
Start: 2023-08-01 | End: 2023-08-02 | Stop reason: HOSPADM

## 2023-08-01 RX ORDER — NICOTINE POLACRILEX 4 MG
15 LOZENGE BUCCAL
Status: DISCONTINUED | OUTPATIENT
Start: 2023-08-01 | End: 2023-08-02 | Stop reason: HOSPADM

## 2023-08-01 RX ADMIN — LISINOPRIL 5 MG: 5 TABLET ORAL at 10:39

## 2023-08-01 RX ADMIN — LEVOTHYROXINE SODIUM 50 MCG: 0.05 TABLET ORAL at 10:41

## 2023-08-01 RX ADMIN — SENNOSIDES AND DOCUSATE SODIUM 2 TABLET: 50; 8.6 TABLET ORAL at 10:39

## 2023-08-01 RX ADMIN — POTASSIUM CHLORIDE 40 MEQ: 1500 TABLET, EXTENDED RELEASE ORAL at 18:10

## 2023-08-01 NOTE — CASE MANAGEMENT/SOCIAL WORK
Continued Stay Note  [facility]     Patient Name: Simran Wall  MRN: [id number]  Today's Date: 8/1/2023    Admit Date: 7/31/2023    Plan: Needs CM interview. Pending Psychiatry Consult due to paranoia and AMS   Discharge Plan       Row Name 08/01/23 1329       Plan    Plan Needs CM interview. Pending Psychiatry Consult due to paranoia and AMS    Patient/Family in Agreement with Plan yes    Plan Comments Barriers: Pending Psychiatry consult. CM did not interview due to paranoia and AMS.                      Phone communication or documentation only - no physical contact with patient or family.     Jimena BRADLEYN,RN Case Manager  [facility]  Phone: Desk- [phone] cell- [phone]

## 2023-08-01 NOTE — PLAN OF CARE
Goal Outcome Evaluation:  Plan of Care Reviewed With: patient           Outcome Evaluation: 54 y/o F with h/o methamphetamine use presented to ER on 7/31/23 acutely confused seen earlier that day in ER for altered mental status and paranoia thought to be d/t methamphetamine use but was discharged. Pt found to have leukocytosis, substance abuse-methamphetamines, and possible underlying depression. No acute intracranial abnormality identified per CT head. At baseline, pt lives at home alone and is I with functional mobility/ADLs without AD. This date, pt is I with bed mobility, functional tranfers, and gait training without AD with no safety concerns observed. Skilled PT services not warranted at this time with PT recommendation of returning home upon d/c.      Anticipated Discharge Disposition (PT): home

## 2023-08-01 NOTE — CONSULTS
"  Referring Provider: Dr Meyer  Reason for Consultation: hallucinations,paranoia , meth dependence       Chief complaint \"I dont like to talk\"     Subjective .     History of present illness:  The patient is a 53 y.o. female who was admitted secondary to mental status changes. Pmhx: asthma, depression, meth dependence. Psych consult was requested by Dr Meyer 2ry to hallucinations, paranoia, substance dependence.  The pt was limited historian, she was very guarded, provided limited answers, was getting agitated when more questions asked. The pt acknowledged long hx of depression, was on prozac and sertraline in the past, reported no improvement on meds, however, she had sporadic compliance with meds  The pt also has very limited recollection about events leading to the admission, she acknowledged multiple stressors, but was not willing to elaborate on details, the pt has very poor social support. The pt lives alone, very isolative her herself, watching TV and using meth on the daily basis.  The pt was not wiling to elaborate on details, stated \"too many question\" and \" I dont like to talk to people\"   Per records,  the pt was seen in the ER on the day of admission, was d/c completely clothed but was found   in the surgical department with only a towel wrapped around her otherwise completely naked.  When asked why the patient was in the surgical department she states \"I do not know\".  When asked what she was doing she states \"I did not do what you accused me of doing\".   Past psych hx:depression, meth dependence       Review of Systems   Review of systems could not be obtained due to   poor cooperation     History    History reviewed. No pertinent past medical history.     History reviewed. No pertinent family history.   Fhx: the pt was not willing to discuss her family     Social History     Tobacco Use    Smoking status: Never    Smokeless tobacco: Never   Vaping Use    Vaping Use: Never used   Substance Use " "Topics    Alcohol use: Never    Drug use: Never     Comment: Pt states no drug use, tox screen positive amphetamines     Meth - PO daily        No medications prior to admission.        Scheduled Meds:  levothyroxine, 50 mcg, Oral, Q AM  lisinopril, 5 mg, Oral, Q24H  pantoprazole, 40 mg, Oral, Q AM  risperiDONE, 0.5 mg, Oral, Q12H  senna-docusate sodium, 2 tablet, Oral, BID  sodium chloride, 10 mL, Intravenous, Q12H         Continuous Infusions:       PRN Meds:    senna-docusate sodium **AND** polyethylene glycol **AND** bisacodyl **AND** bisacodyl    Calcium Replacement - Follow Nurse / BPA Driven Protocol    HYDROcodone-acetaminophen    Magnesium Standard Dose Replacement - Follow Nurse / BPA Driven Protocol    nitroglycerin    Phosphorus Replacement - Follow Nurse / BPA Driven Protocol    Potassium Replacement - Follow Nurse / BPA Driven Protocol    [COMPLETED] Insert Peripheral IV **AND** sodium chloride    sodium chloride    sodium chloride      Allergies:  Cephalexin and Ibuprofen      Objective     Vital Signs   /89   Pulse 73   Temp 98.3 øF (36.8 øC) (Oral)   Resp 20   Ht 157.5 cm (62\")   Wt 97.8 kg (215 lb 9.8 oz)   SpO2 95%   BMI 39.44 kg/mý     Physical Exam:    Musculoskeletal:   Muscle strength and tone: WNL  Abnormal Movements: none   Gait: unable to assess, the pt was in bed      General Appearance:    In NAD, somnolent         Mental Status Exam:   Hygiene:   fair  Cooperation:   limited   Eye Contact:  Poor  Behavior and Psychomotor Activity: Slow  Speech:  Minimal  Mood: tired   Affect:  Restricted with underlying dysphoria  Thought Process:  Goal directed  Associations: Intact   Thought Content:  Normal  Language: appropriate   Suicidal Ideations:  None  Homicidal:  None  Hallucinations:  Not demonstrated today  Delusion:  Unable to demonstrate 2ry to poor cooperation   Orientation:  To person, Place, and Situation  Memory:   fair   Concentration and computation: decreased   Attention " span: short   Fund of knowledge: limited   Reliability:  poor  Insight:  Poor  Judgement:  Impaired  Impulse Control:  Poor      Medications and allergies reviewed      Lab Results   Component Value Date    GLUCOSE 165 (H) 07/31/2023    CALCIUM 8.5 (L) 07/31/2023     07/31/2023    K 3.7 07/31/2023    CO2 24.0 07/31/2023     07/31/2023    BUN 14 07/31/2023    CREATININE 0.56 (L) 07/31/2023    EGFRIFNONA 130 05/24/2021    BCR 25.0 07/31/2023    ANIONGAP 11.0 07/31/2023       Last Urine Toxicity  More data may exist         Latest Ref Rng & Units 7/31/2023 7/15/2018   LAST URINE TOXICITY RESULTS   Creatinine, Urine mg/dL - 57.8Urine creatinines <20 mg/dL may express a dilution effect which can cause false negatives for the drug screen.    Amphetamine, Urine Qual NEGATIVE - NEGATIVE    Barbiturates Screen, Urine Negative Negative  NEGATIVE    Benzodiazepine Screen, Urine Negative Negative  NEGATIVE    Cocaine Screen, Urine Negative Negative  NEGATIVE    Methadone Screen , Urine Negative Negative  NEGATIVE        No results found for: PHENYTOIN, PHENOBARB, VALPROATE, CBMZ    Lab Results   Component Value Date     07/31/2023    BUN 14 07/31/2023    CREATININE 0.56 (L) 07/31/2023    TSH 30.320 (H) 07/31/2023    WBC 11.00 (H) 07/31/2023       Brief Urine Lab Results  (Last result in the past 365 days)        Color   Clarity   Blood   Leuk Est   Nitrite   Protein   CREAT   Urine HCG        07/31/23 0542 Alexsandra   Clear   Negative   Trace   Negative   Trace               UDS 7/31/23 + meth     Assessment & Plan       Mental status alteration    Type 2 diabetes mellitus    Hypothyroidism    Hypertension    Delirium          Assessment: methamphetamine dependence , substance induced mood d/o   Treatment Plan: the pt presented with the sxs of chemical dependence, mood d/o  The pt was confused and displayed psychotic sxs (when intoxicated with meth)   The pt was started on risperidone  D/c/ risperidone , start  ability 5 mg po QHS (better tolerated)   The pt will benefit from psych f/u, CD treatment, but the pt has no desire to get help and stop using meth   Cont to provide support,  The pt can be d/c when medically stable  Will follow    Treatment Plan discussed with: Patient and nursing     I discussed the patients findings and my recommendations with patient and nursing staff    I have reviewed and approved the behavioral health treatment plans and problem list. Yes  Thank you for the consult   Referring MD has access to consult report and progress notes in EMR       This document has been electronically signed by Chikis Ny MD  August 1, 2023 13:15 EDT    Part of this note may be an electronic transcription/translation of spoken language to printed text using the Dragon Dictation System.

## 2023-08-01 NOTE — NURSING NOTE
Patient refusing insulin at this time but compliant with most PO medications today. She refused Risperdal this morning.

## 2023-08-01 NOTE — NURSING NOTE
Patient was not hooked up to continuous cardiac monitoring upon entering day shift. Patient refused this morning but agreed to be put back on after she gets a shower.

## 2023-08-01 NOTE — PLAN OF CARE
Goal Outcome Evaluation:      Pt spoke very little during admission and shift. Did not answer many questions. Turned bed alarm on and made frequent safety checks. Pt ended up resting most of the shift.

## 2023-08-01 NOTE — PROGRESS NOTES
[facility] Medicine Services   Daily Progress Note    Patient Name: Simran Wall  : 1970  MRN: [id number]  Primary Care Physician:  John Martino MD  Date of admission: 2023  Date and Time of Service: 23 at 0930      Subjective      Chief Complaint: Change in mental status     Patient was seen and examined this am, awake an alert. Denies any complaints. Awaiting pschy eval    ROS   12 point review of system is negative except as in HPI      Objective      Vitals:   Temp:  [98 øF (36.7 øC)-98.9 øF (37.2 øC)] 98.3 øF (36.8 øC)  Heart Rate:  [72-97] 73  Resp:  [16-20] 20  BP: (125-155)/(65-91) 155/89    Physical Exam      Constitutional:       General: She is not in acute distress.     Appearance: She is well-developed. She is obese. She is not ill-appearing, toxic-appearing or diaphoretic.   HENT:      Head: Normocephalic and atraumatic.      Nose: Nose normal. No congestion or rhinorrhea.      Mouth/Throat:      Mouth: Mucous membranes are moist.      Pharynx: No oropharyngeal exudate.   Eyes:      General: No scleral icterus.        Right eye: No discharge.         Left eye: No discharge.      Extraocular Movements: Extraocular movements intact.      Conjunctiva/sclera: Conjunctivae normal.      Pupils: Pupils are equal, round, and reactive to light.   Neck:      Thyroid: No thyromegaly.      Vascular: No carotid bruit or JVD.      Trachea: No tracheal deviation.   Cardiovascular:      Rate and Rhythm: Normal rate and regular rhythm.      Pulses: Normal pulses.      Heart sounds: Normal heart sounds. No murmur heard.    No friction rub. No gallop.   Pulmonary:      Effort: Pulmonary effort is normal. No respiratory distress.      Breath sounds: Normal breath sounds. No stridor. No wheezing, rhonchi or rales.   Chest:      Chest wall: No tenderness.   Abdominal:      General: Bowel sounds are normal. There is no distension.      Palpations: Abdomen is soft. There is no mass.       "Tenderness: There is no abdominal tenderness. There is no guarding or rebound.      Hernia: No hernia is present.   Musculoskeletal:         General: No swelling, tenderness, deformity or signs of injury. Normal range of motion.      Cervical back: Normal range of motion and neck supple. No rigidity. No muscular tenderness.      Right lower leg: No edema.      Left lower leg: No edema.   Lymphadenopathy:      Cervical: No cervical adenopathy.   Skin:     General: Skin is warm and dry.      Coloration: Skin is not jaundiced or pale.      Findings: No bruising, erythema or rash.   Neurological:      General: No focal deficit present.      Mental Status: She is alert and oriented to person, place, and time.      Cranial Nerves: No cranial nerve deficit.      Sensory: No sensory deficit.      Motor: No weakness or abnormal muscle tone.      Coordination: Coordination normal.   Psychiatric:         Mood and Affect: Mood normal.      Result Review    Result Review:  I have personally reviewed the results from the time of this admission to 8/1/2023 13:01 EDT and agree with these findings:  [x]  Laboratory  []  Microbiology  [x]  Radiology  []  EKG/Telemetry   []  Cardiology/Vascular   []  Pathology  []  Old records  []  Other:  Most notable findings include:           Assessment & Plan      Brief Patient Summary:  Simran Wall is a 53 y.o. female with history of methamphetamine use . who presented to [facility] ER on 7/31/2023 acutely confused, per patient\" went to gas station to get gas. I thought my car was going to blow up:  She was seen earlier in the day in ER for altered mental status, paranoia thought to be secondary to methamphetamine use but was discharged. Patient was discharged completely clothed, but was then found a few hours later in the surgical department with only a towel wrapped around her otherwise completely naked.  When asked why the patient was in the surgical department she states \"I do " "not know\".  When asked what she was doing she states \"I did not do what you accused me of doing\".  When asked if she left the hospital before finding her way to the surgical department she states \"what's it to you I do not need to tell you that\".  Patient then crosses her arms sits on the exam stretcher and does not look at me.  She does not complain of any pain.  She is in no acute respiratory distress.  Head CT -no acute intracranial abnormality      levothyroxine, 50 mcg, Oral, Q AM  lisinopril, 5 mg, Oral, Q24H  pantoprazole, 40 mg, Oral, Q AM  risperiDONE, 0.5 mg, Oral, Q12H  senna-docusate sodium, 2 tablet, Oral, BID  sodium chloride, 10 mL, Intravenous, Q12H             Active Hospital Problems:  Active Hospital Problems    Diagnosis     **Mental status alteration     Delirium     Type 2 diabetes mellitus     Hypothyroidism     Hypertension      Plan:   Change in mental status, Delirium -Substance abuse-methamphetamines, possible underlying depression  Plan:   -Awaiting Psych consult.  -IV fluids.  -Risperdal twice daily  CT -1.No acute intracranial abnormality is identified.  2.Periventricular white matter hypoattenuation may be related to chronic microvascular ischemic change. This is greater than expected given the patient's age. Follow-up MRI may be considered if clinically indicated.     Leukocytosis. Likely reactive,  -Monitor CBC. Check procal  -UA negative for nitrite and bacteria, trace leukocytes     Hypertension-/72  -Cont lisinopril      Type 2 diabetes  -Insulin sliding scale  -Diabetic diet  -Glucometers ACH S     Hypothyroidism  -TSH-30  -Check T4  -Home meds not reviewed.  -on Synthroid 50 mcg daily       DVT prophylaxis:  Mechanical DVT prophylaxis orders are present.    CODE STATUS:         Disposition:  I expect patient to be discharged in 1-2 days.    Electronically signed by Roque Galarza MD, 08/01/23, 13:01 EDT.  [facility] Hospitalist Team           "

## 2023-08-01 NOTE — NURSING NOTE
Patient states she has no thoughts of suicide or plans to harm herself. Psych saw patient at bedside today, see orders.

## 2023-08-01 NOTE — PLAN OF CARE
Problem: Adult Inpatient Plan of Care  Goal: Plan of Care Review  Outcome: Ongoing, Progressing  Goal: Patient-Specific Goal (Individualized)  Outcome: Ongoing, Progressing  Goal: Absence of Hospital-Acquired Illness or Injury  Outcome: Ongoing, Progressing  Intervention: Identify and Manage Fall Risk  Recent Flowsheet Documentation  Taken 8/1/2023 1812 by Bebe Lemus LPN  Safety Promotion/Fall Prevention: safety round/check completed  Taken 8/1/2023 1600 by Bebe Lemus LPN  Safety Promotion/Fall Prevention:   safety round/check completed   clutter free environment maintained  Taken 8/1/2023 1400 by Bebe Lemus LPN  Safety Promotion/Fall Prevention: safety round/check completed  Taken 8/1/2023 1200 by Bebe Lemus LPN  Safety Promotion/Fall Prevention: safety round/check completed  Taken 8/1/2023 1038 by Bebe Lemus LPN  Safety Promotion/Fall Prevention:   safety round/check completed   activity supervised  Taken 8/1/2023 0800 by Bebe Lemus LPN  Safety Promotion/Fall Prevention: safety round/check completed  Taken 8/1/2023 0700 by Bebe Lemus LPN  Safety Promotion/Fall Prevention: (linens picked up and bed alarm turned on)   clutter free environment maintained   fall prevention program maintained  Intervention: Prevent Skin Injury  Recent Flowsheet Documentation  Taken 8/1/2023 1038 by Bebe Lemus LPN  Body Position: position changed independently  Skin Protection:   adhesive use limited   pulse oximeter probe site changed   tubing/devices free from skin contact  Intervention: Prevent and Manage VTE (Venous Thromboembolism) Risk  Recent Flowsheet Documentation  Taken 8/1/2023 1600 by Bebe Lemus LPN  VTE Prevention/Management:   compression stockings off   sequential compression devices off  Taken 8/1/2023 1038 by Bebe Lemus LPN  Activity Management: ambulated to bathroom  VTE Prevention/Management: (patient up adlib)   compression stockings off   sequential compression devices  off  Range of Motion: ROM (range of motion) performed  Intervention: Prevent Infection  Recent Flowsheet Documentation  Taken 8/1/2023 1812 by Bebe Lemus LPN  Infection Prevention:   cohorting utilized   single patient room provided  Taken 8/1/2023 1600 by Bebe Lemus LPN  Infection Prevention: single patient room provided  Taken 8/1/2023 1400 by Bebe Lemus LPN  Infection Prevention:   cohorting utilized   single patient room provided  Taken 8/1/2023 1200 by Bebe Lemus LPN  Infection Prevention:   cohorting utilized   single patient room provided  Taken 8/1/2023 1038 by Bebe Lemus LPN  Infection Prevention:   cohorting utilized   single patient room provided  Taken 8/1/2023 0800 by Bebe Lemus LPN  Infection Prevention:   cohorting utilized   single patient room provided  Goal: Optimal Comfort and Wellbeing  Outcome: Ongoing, Progressing  Intervention: Monitor Pain and Promote Comfort  Recent Flowsheet Documentation  Taken 8/1/2023 1038 by Bebe Lemus LPN  Pain Management Interventions:   pillow support provided   position adjusted   see MAR  Taken 8/1/2023 1000 by Bebe Lemus LPN  Pain Management Interventions:   pillow support provided   position adjusted  Intervention: Provide Person-Centered Care  Recent Flowsheet Documentation  Taken 8/1/2023 1038 by Bebe Lemus LPN  Trust Relationship/Rapport:   care explained   choices provided   emotional support provided   empathic listening provided   questions answered   questions encouraged   thoughts/feelings acknowledged  Goal: Readiness for Transition of Care  Outcome: Ongoing, Progressing     Problem: Pain Acute  Goal: Acceptable Pain Control and Functional Ability  Outcome: Ongoing, Progressing  Intervention: Prevent or Manage Pain  Recent Flowsheet Documentation  Taken 8/1/2023 1812 by Bebe Lemus LPN  Medication Review/Management: medications reviewed  Taken 8/1/2023 1600 by Bebe Lemus LPN  Medication Review/Management:  medications reviewed  Taken 8/1/2023 1400 by Bebe Lemus LPN  Medication Review/Management: medications reviewed  Taken 8/1/2023 1200 by Bebe Lemus LPN  Medication Review/Management: medications reviewed  Taken 8/1/2023 1038 by Bebe Lemus LPN  Sensory Stimulation Regulation: visual stimulation minimized  Sleep/Rest Enhancement: awakenings minimized  Medication Review/Management: medications reviewed  Taken 8/1/2023 0800 by Bebe Lemus LPN  Medication Review/Management: medications reviewed  Intervention: Develop Pain Management Plan  Recent Flowsheet Documentation  Taken 8/1/2023 1038 by Bebe Lemus LPN  Pain Management Interventions:   pillow support provided   position adjusted   see MAR  Taken 8/1/2023 1000 by Bebe Lmeus LPN  Pain Management Interventions:   pillow support provided   position adjusted  Intervention: Optimize Psychosocial Wellbeing  Recent Flowsheet Documentation  Taken 8/1/2023 1038 by Bebe Lemus LPN  Supportive Measures: active listening utilized  Diversional Activities:   television   music     Problem: Behavioral Health Comorbidity  Goal: Maintenance of Behavioral Health Symptom Control  Outcome: Ongoing, Progressing  Intervention: Maintain Behavioral Health Symptom Control  Recent Flowsheet Documentation  Taken 8/1/2023 1812 by Bebe Lemus LPN  Medication Review/Management: medications reviewed  Taken 8/1/2023 1600 by Bebe Lemus LPN  Medication Review/Management: medications reviewed  Taken 8/1/2023 1400 by Bebe Lemus LPN  Medication Review/Management: medications reviewed  Taken 8/1/2023 1200 by Bebe Lemus LPN  Medication Review/Management: medications reviewed  Taken 8/1/2023 1038 by Bebe Lemus LPN  Medication Review/Management: medications reviewed  Taken 8/1/2023 0800 by Bebe Lemus LPN  Medication Review/Management: medications reviewed     Problem: Fall Injury Risk  Goal: Absence of Fall and Fall-Related Injury  Outcome: Ongoing,  Progressing  Intervention: Identify and Manage Contributors  Recent Flowsheet Documentation  Taken 8/1/2023 1812 by Bebe Lemus LPN  Medication Review/Management: medications reviewed  Taken 8/1/2023 1600 by Bebe Lemus LPN  Medication Review/Management: medications reviewed  Taken 8/1/2023 1400 by Bebe Lemus LPN  Medication Review/Management: medications reviewed  Taken 8/1/2023 1200 by Bebe Lemus LPN  Medication Review/Management: medications reviewed  Taken 8/1/2023 1038 by Bebe Lemus LPN  Medication Review/Management: medications reviewed  Self-Care Promotion: independence encouraged  Taken 8/1/2023 0800 by Bebe Lemus LPN  Medication Review/Management: medications reviewed  Intervention: Promote Injury-Free Environment  Recent Flowsheet Documentation  Taken 8/1/2023 1812 by Bebe Lemus LPN  Safety Promotion/Fall Prevention: safety round/check completed  Taken 8/1/2023 1600 by Bebe Lemus LPN  Safety Promotion/Fall Prevention:   safety round/check completed   clutter free environment maintained  Taken 8/1/2023 1400 by Bebe Lemus LPN  Safety Promotion/Fall Prevention: safety round/check completed  Taken 8/1/2023 1200 by Bebe Lemus LPN  Safety Promotion/Fall Prevention: safety round/check completed  Taken 8/1/2023 1038 by Bebe Lemus LPN  Safety Promotion/Fall Prevention:   safety round/check completed   activity supervised  Taken 8/1/2023 0800 by Bebe Lemus LPN  Safety Promotion/Fall Prevention: safety round/check completed  Taken 8/1/2023 0700 by Bebe Lemus LPN  Safety Promotion/Fall Prevention: (linens picked up and bed alarm turned on)   clutter free environment maintained   fall prevention program maintained   Goal Outcome Evaluation:

## 2023-08-01 NOTE — THERAPY EVALUATION
Patient Name: Simran Wall  : 1970    MRN: [id number]                              Today's Date: 2023       Admit Date: 2023    Visit Dx:     ICD-10-CM ICD-9-CM   1. Altered mental status, unspecified altered mental status type  R41.82 780.97   2. Methamphetamine abuse  F15.10 305.70   3. Paranoid behavior  F22 297.8     Patient Active Problem List   Diagnosis    Type 2 diabetes mellitus    Hypothyroidism    Hypertension    Mental status alteration    Delirium     History reviewed. No pertinent past medical history.  History reviewed. No pertinent surgical history.   General Information       Row Name 23 1030          General Information    Patient Profile Reviewed yes  -RM     Prior Level of Function --  Pt was I with functional mobility/ ADLs without AD at [facility]. Pt was completing household tasks, community errands, and was driving.  -     Existing Precautions/Restrictions no known precautions/restrictions  -       Row Name 23 1030          Living Environment    People in Home --  Pt was living at home alone with a ramp to enter and no steps inside home. Pt has walk-in shower with shower chair.  -       Row Name 23 1030          Cognition    Orientation Status (Cognition) oriented x 4  -       Row Name 23 1030          Safety Issues, Functional Mobility    Impairments Affecting Function (Mobility) other (see comments)  No impairments affecting mobility at this time.  -               User Key  (r) = Recorded By, (t) = Taken By, (c) = Cosigned By      Initials Name Provider Type     Fern Yañez, PT Physical Therapist                   Mobility       Row Name 23 1031          Bed Mobility    Bed Mobility bed mobility (all) activities  -RM     All Activities, [place] (Bed Mobility) independent  -     Comment, (Bed Mobility) no PT cuing needed  -       Row Name 23 1031          Bed-Chair Transfer    Bed-Chair [place] (Transfers)  "independent  -RM     Comment, (Bed-Chair Transfer) without AD; no safety concerns observed  -RM       Row Name 08/01/23 1031          Sit-Stand Transfer    Sit-Stand [place] (Transfers) independent  -RM     Comment, (Sit-Stand Transfer) without AD; no safety concerns  -RM       Row Name 08/01/23 1031          Gait/Stairs (Locomotion)    [place] Level (Gait) independent  -RM     Distance in Feet (Gait) without AD; able to navigate from bed <-> bathroom and around obstalces within room independently with no safety concerns observed; pt declined ambulating in hallways reporting \"I don't want to give a free show\" despite being fully covered with gown donned  -RM     Comment, (Gait/Stairs) No need to address as pt has ramp to enter home/ no steps inside home  -RM               User Key  (r) = Recorded By, (t) = Taken By, (c) = Cosigned By      Initials Name Provider Type    Fern Cabrera, DAXA Physical Therapist                   Obj/Interventions       Row Name 08/01/23 1033          Range of Motion Comprehensive    General Range of Motion bilateral lower extremity ROM WFL  -RM       Row Name 08/01/23 1033          Strength Comprehensive (MMT)    Comment, General Manual Muscle Testing (MMT) Assessment MMT of BLE grossly 5/5  -RM               User Key  (r) = Recorded By, (t) = Taken By, (c) = Cosigned By      Initials Name Provider Type    Fern Cabrera, DAXA Physical Therapist                   Goals/Plan    No documentation.                  Clinical Impression       Row Name 08/01/23 1034          Pain    Pretreatment Pain Rating 7/10  -RM     Posttreatment Pain Rating 7/10  -RM     Pre/Posttreatment Pain Comment Low back pain pt reports in from hospital bed  -RM       Row Name 08/01/23 1034          Plan of Care Review    Plan of Care Reviewed With patient  -RM     Outcome Evaluation 54 y/o F with h/o methamphetamine use presented to ER on 7/31/23 acutely confused seen earlier that day in ER for altered " mental status and paranoia thought to be d/t methamphetamine use but was discharged. Pt found to have leukocytosis, substance abuse-methamphetamines, and possible underlying depression. No acute intracranial abnormality identified per CT head. At baseline, pt lives at home alone and is I with functional mobility/ADLs without AD. This date, pt is I with bed mobility, functional tranfers, and gait training without AD with no safety concerns observed. Skilled PT services not warranted at this time with PT recommendation of returning home upon d/c.  -       Row Name 08/01/23 1034          Therapy Assessment/Plan (PT)    Criteria for Skilled Interventions Met (PT) no;no problems identified which require skilled intervention  -RM     Therapy Frequency (PT) evaluation only  -       Row Name 08/01/23 1034          Positioning and Restraints    Pre-Treatment Position in bed  -RM     Post Treatment Position chair  -RM     In Chair notified nsg;call light within reach  -RM               User Key  (r) = Recorded By, (t) = Taken By, (c) = Cosigned By      Initials Name Provider Type    RM Fern Yañez, PT Physical Therapist                   Outcome Measures       Row Name 08/01/23 1038          How much help from another person do you currently need...    Turning from your back to your side while in flat bed without using bedrails? 4  -RM     Moving from lying on back to sitting on the side of a flat bed without bedrails? 4  -RM     Moving to and from a bed to a chair (including a wheelchair)? 4  -RM     Standing up from a chair using your arms (e.g., wheelchair, bedside chair)? 4  -RM     Climbing 3-5 steps with a railing? 4  -RM     To walk in hospital room? 4  -RM     AM-PAC 6 Clicks Score (PT) 24  -RM     Highest level of mobility 8 --> Walked 250 feet or more  -       Row Name 08/01/23 1038          Functional Assessment    Outcome Measure Options AM-PAC 6 Clicks Basic Mobility (PT)  -RM               User Key  (r)  = Recorded By, (t) = Taken By, (c) = Cosigned By      Initials Name Provider Type     Fern Yañez PT Physical Therapist                                 Physical Therapy Education       Title: PT OT SLP Therapies (Done)       Topic: Physical Therapy (Done)       Point: Mobility training (Done)       Learning Progress Summary             Patient Acceptance, E, VU by  at 8/1/2023 1038                                         User Key       Initials Effective Dates Name Provider Type Discipline     03/27/23 -  Fern Yañez PT Physical Therapist PT                  PT Recommendation and Plan     Plan of Care Reviewed With: patient  Outcome Evaluation: 54 y/o F with h/o methamphetamine use presented to ER on 7/31/23 acutely confused seen earlier that day in ER for altered mental status and paranoia thought to be d/t methamphetamine use but was discharged. Pt found to have leukocytosis, substance abuse-methamphetamines, and possible underlying depression. No acute intracranial abnormality identified per CT head. At baseline, pt lives at home alone and is I with functional mobility/ADLs without AD. This date, pt is I with bed mobility, functional tranfers, and gait training without AD with no safety concerns observed. Skilled PT services not warranted at this time with PT recommendation of returning home upon d/c.     Time Calculation:         PT Charges       Row Name 08/01/23 1039             Time Calculation    Start Time 1007  -RM      Stop Time 1027  -RM      Time Calculation (min) 20 min  -RM      PT Received On 08/01/23  -RM         Time Calculation- PT    Total Timed Code Minutes- PT 0 minute(s)  -                User Key  (r) = Recorded By, (t) = Taken By, (c) = Cosigned By      Initials Name Provider Type     Fern Yañez PT Physical Therapist                  Therapy Charges for Today       Code Description Service Date Service Provider Modifiers Qty    [id number] HC PT EVAL LOW COMPLEXITY 4  8/1/2023 Fern Yañez, PT GP 1            PT G-Codes  Outcome Measure Options: AM-PAC 6 Clicks Basic Mobility (PT)  AM-PAC 6 Clicks Score (PT): 24  PT Discharge Summary  Anticipated Discharge Disposition (PT): home    Fern Yañez, DAXA  8/1/2023

## 2023-08-02 VITALS
RESPIRATION RATE: 18 BRPM | SYSTOLIC BLOOD PRESSURE: 119 MMHG | OXYGEN SATURATION: 95 % | DIASTOLIC BLOOD PRESSURE: 65 MMHG | HEIGHT: 62 IN | HEART RATE: 77 BPM | BODY MASS INDEX: 39.68 KG/M2 | WEIGHT: 215.61 LBS | TEMPERATURE: 98.4 F

## 2023-08-02 LAB
GLUCOSE BLDC GLUCOMTR-MCNC: 162 MG/DL (ref 70–105)
GLUCOSE BLDC GLUCOMTR-MCNC: 194 MG/DL (ref 70–105)
WHOLE BLOOD HOLD SPECIMEN: NORMAL

## 2023-08-02 PROCEDURE — 99231 SBSQ HOSP IP/OBS SF/LOW 25: CPT | Performed by: PSYCHIATRY & NEUROLOGY

## 2023-08-02 PROCEDURE — 63710000001 INSULIN LISPRO (HUMAN) PER 5 UNITS: Performed by: INTERNAL MEDICINE

## 2023-08-02 PROCEDURE — 82948 REAGENT STRIP/BLOOD GLUCOSE: CPT

## 2023-08-02 RX ORDER — LEVOTHYROXINE SODIUM 0.05 MG/1
50 TABLET ORAL
Qty: 30 TABLET | Refills: 0 | Status: ON HOLD | OUTPATIENT
Start: 2023-08-03

## 2023-08-02 RX ORDER — LISINOPRIL 5 MG/1
5 TABLET ORAL
Qty: 30 TABLET | Refills: 0 | Status: ON HOLD | OUTPATIENT
Start: 2023-08-03

## 2023-08-02 RX ORDER — ARIPIPRAZOLE 5 MG/1
5 TABLET ORAL NIGHTLY
Qty: 30 TABLET | Refills: 0 | Status: ON HOLD | OUTPATIENT
Start: 2023-08-02

## 2023-08-02 RX ADMIN — Medication 10 ML: at 09:23

## 2023-08-02 RX ADMIN — LISINOPRIL 5 MG: 5 TABLET ORAL at 09:23

## 2023-08-02 RX ADMIN — INSULIN LISPRO 2 UNITS: 100 INJECTION, SOLUTION INTRAVENOUS; SUBCUTANEOUS at 09:23

## 2023-08-02 NOTE — CASE MANAGEMENT/SOCIAL WORK
Case Management Discharge Note      Final Note: Routine home                 Transportation Services  Taxi: other ([employer])    Final Discharge Disposition Code: 01 - home or self-care

## 2023-08-02 NOTE — CASE MANAGEMENT/SOCIAL WORK
Continued Stay Note  [facility]     Patient Name: Simran Wall  MRN: [id number]  Today's Date: 8/2/2023    Admit Date: 7/31/2023    Plan: Return home. Refused interview   Discharge Plan       Row Name 08/02/23 1021       Plan    Plan Return home. Refused interview    Plan Comments Discharge orders for today. Refused to talk to CM.                      Phone communication or documentation only - no physical contact with patient or family.     Jimena PADILLA,RN Case Manager  [facility]  Phone: Desk- [phone] cell- [phone]

## 2023-08-02 NOTE — CONSULTS
"Diabetes Education  Assessment/Teaching    Patient Name:  Simran Wall  YOB: 1970  MRN: [id number]  Admit Date:  7/31/2023      Assessment Date:  8/2/2023  Flowsheet Row Most Recent Value   General Information     Referral From: A1c, Blood glucose  [On 8/1/2023 A1c was 9.6% and admission blood sugar was 239.]   Height 157.5 cm (62\")   Height Method Actual   Weight --  [PT refused]   Weight Method Bed scale   Pregnancy Assessment    Diabetes History    What type of diabetes do you have? Type 2   Length of Diabetes Diagnosis 10 + years  [Patient stated that she was diagnosed in 2011]   Current DM knowledge fair   Do you test your blood sugar at home? no   Have you had high blood sugar? (>140mg/dl) yes   How often do you have high blood sugar? unknown   When was your last high blood sugar? Admission blood sugar 239   Education Preferences    What areas of diabetes would you like to learn about? avoiding high blood sugar, diabetes complications, testing my blood sugar at home, medications for diabetes   Nutrition Information    Assessment Topics    Taking Medication - Assessment Needs education   Problem Solving - Assessment Needs education   Reducing Risk - Assessment Needs education   Monitoring - Assessment Needs education   DM Goals    Taking Medication - Goal Today   Problem Solving - Goal Today   Reducing Risk - Goal Today   Monitoring - Goal Today            Flowsheet Row Most Recent Value   DM Education Needs    Meter Has own   Meter Type Other (comment)  [Patient stated she has a new glucometer but unsure of the name.]   Frequency of Testing Daily  [Discussed with patient that it is recommended to check blood sugar daily varying the times before meals and at bedtime.]   Medication Oral  [Patient stated she has Metfromin at home but hasn't been taking. Discussed with patient the importance of taking meds as prescribed.]   Problem Solving Hyperglycemia, Signs, Symptoms, Treatment   Reducing " Risks A1C testing  [On 8/1/2023 A1c was 9.6%.]   Discharge Plan Home   Motivation Moderate   Teaching Method Discussion, Handouts   Patient Response Verbalized understanding              Other Comments:  A1c info sheet given with discussion on A1c target and healthy blood sugar range. Patient stated she had her gallbladder taken out and they told her she might always have diarrhea so she wouldn't know if the Metformin was causing diarrhea. Patient stated she drinks regular Mountain Dew and doesn't want to do diet drinks. Asked patient if she could alternate with water when drinking Mountain Dew and she said she could alternate. Discussed with patient that drinking water and exercise are 2 natural ways to help in the control of blood sugar. Patient has no further questions or concerns related to diabetes at this time.        Electronically signed by:  Jimena Valadez RN  08/02/23 14:23 EDT

## 2023-08-02 NOTE — DISCHARGE SUMMARY
"             [facility]  Discharge Summary    Date of Service: 23  Patient Name: Simran Wall  : 1970  MRN: [id number]    Date of Admission: 2023  Discharge Diagnosis: Delirium due to substance abuse-methamphetamine  Date of Discharge:  23  Primary Care Physician: John Martino MD      Presenting Problem:   Delirium [R41.0]  Methamphetamine abuse [F15.10]  Paranoid behavior [F22]  Altered mental status, unspecified altered mental status type [R41.82]    Active and Resolved Hospital Problems:  Active Hospital Problems    Diagnosis POA    **Mental status alteration [R41.82] Yes    Delirium [R41.0] Yes    Type 2 diabetes mellitus [E11.9] Yes    Hypothyroidism [E03.9] Yes    Hypertension [I10] Yes      Resolved Hospital Problems   No resolved problems to display.         Hospital Course     Hospital Course:  Simran Wall is a 53 y.o. female with history of methamphetamine use . who presented to [facility] ER on 2023 acutely confused, per patient\" went to gas station to get gas. I thought my car was going to blow up:  She was seen earlier in the day in ER for altered mental status, paranoia thought to be secondary to methamphetamine use but was discharged. Patient was discharged completely clothed, but was then found a few hours later in the surgical department with only a towel wrapped around her otherwise completely naked.  When asked why the patient was in the surgical department she states \"I do not know\".  When asked what she was doing she states \"I did not do what you accused me of doing\".  When asked if she left the hospital before finding her way to the surgical department she states \"what's it to you I do not need to tell you that\".  Patient then crosses her arms sits on the exam stretcher and does not look at me.  She does not complain of any pain.  She is in no acute respiratory distress.  Head CT -no acute intracranial abnormality  Seen by " psych , declined CD tx, has very poor insight about meth use. The pt denied AVH/Si/Hi . Cleared for dc.        DISCHARGE Follow Up Recommendations for labs and diagnostics: none      Reasons For Change In Medications and Indications for New Medications:      Day of Discharge     Vital Signs:  Temp:  [98.2 øF (36.8 øC)-98.5 øF (36.9 øC)] 98.4 øF (36.9 øC)  Heart Rate:  [76-88] 77  Resp:  [18-24] 18  BP: (119-137)/(65-85) 119/65    Physical Exam:  Physical Exam   Constitutional:       General: She is not in acute distress.     Appearance: She is well-developed. She is obese. She is not ill-appearing, toxic-appearing or diaphoretic.   HENT:      Head: Normocephalic and atraumatic.      Nose: Nose normal. No congestion or rhinorrhea.      Mouth/Throat:      Mouth: Mucous membranes are moist.      Pharynx: No oropharyngeal exudate.   Eyes:      General: No scleral icterus.        Right eye: No discharge.         Left eye: No discharge.      Extraocular Movements: Extraocular movements intact.      Conjunctiva/sclera: Conjunctivae normal.      Pupils: Pupils are equal, round, and reactive to light.   Neck:      Thyroid: No thyromegaly.      Vascular: No carotid bruit or JVD.      Trachea: No tracheal deviation.   Cardiovascular:      Rate and Rhythm: Normal rate and regular rhythm.      Pulses: Normal pulses.      Heart sounds: Normal heart sounds. No murmur heard.    No friction rub. No gallop.   Pulmonary:      Effort: Pulmonary effort is normal. No respiratory distress.      Breath sounds: Normal breath sounds. No stridor. No wheezing, rhonchi or rales.   Chest:      Chest wall: No tenderness.   Abdominal:      General: Bowel sounds are normal. There is no distension.      Palpations: Abdomen is soft. There is no mass.      Tenderness: There is no abdominal tenderness. There is no guarding or rebound.      Hernia: No hernia is present.   Musculoskeletal:         General: No swelling, tenderness, deformity or signs of  injury. Normal range of motion.      Cervical back: Normal range of motion and neck supple. No rigidity. No muscular tenderness.      Right lower leg: No edema.      Left lower leg: No edema.   Lymphadenopathy:      Cervical: No cervical adenopathy.   Skin:     General: Skin is warm and dry.      Coloration: Skin is not jaundiced or pale.      Findings: No bruising, erythema or rash.   Neurological:      General: No focal deficit present.      Mental Status: She is alert and oriented to person, place, and time.      Cranial Nerves: No cranial nerve deficit.      Sensory: No sensory deficit.      Motor: No weakness or abnormal muscle tone.      Coordination: Coordination normal.   Psychiatric:         Mood and Affect: Mood normal.     Pertinent  and/or Most Recent Results     LAB RESULTS:      Lab 08/01/23  1153 07/31/23  1402 07/31/23  0526   WBC  --  11.00* 11.20*   HEMOGLOBIN  --  13.9 13.9   HEMATOCRIT  --  42.2 41.4   PLATELETS  --  245 284   NEUTROS ABS  --  5.60 6.20   LYMPHS ABS  --  4.30* 4.20*   MONOS ABS  --  0.80 0.80   EOS ABS  --  0.10 0.10   MCV  --  90.2 88.6   LACTATE 1.2  --   --          Lab 08/01/23  2313 08/01/23  1153 07/31/23  2041 07/31/23  1601 07/31/23  0526   SODIUM 140 140 139 140 139   POTASSIUM 4.1 3.6 3.7 3.6 3.7   CHLORIDE 104 103 104 102 101   CO2 25.0 25.0 24.0 24.0 24.0   ANION GAP 11.0 12.0 11.0 14.0 14.0   BUN 13 11 14 16 19   CREATININE 0.76 0.62 0.56* 0.66 0.74   EGFR 92.7 105.3 107.9 103.7 95.7   GLUCOSE 221* 150* 165* 173* 239*   CALCIUM 8.6 8.9 8.5* 9.1 9.3   MAGNESIUM 1.9 2.0 1.9  --  1.9   PHOSPHORUS 2.9 3.2 3.5  --   --    HEMOGLOBIN A1C  --  9.60*  --   --   --    TSH  --   --   --   --  30.320*         Lab 08/01/23  1153 07/31/23  1601 07/31/23  0526   TOTAL PROTEIN 7.4 7.6 7.8   ALBUMIN 4.3 4.4 4.6   GLOBULIN 3.1 3.2 3.2   ALT (SGPT) 14 13 14   AST (SGOT) 18 14 16   BILIRUBIN 0.6 0.7 0.5   ALK PHOS 71 72 74         Lab 07/31/23  2041   PROBNP 64.0             Lab  08/01/23  1153   FERRITIN 205.80*         Brief Urine Lab Results  (Last result in the past 365 days)        Color   Clarity   Blood   Leuk Est   Nitrite   Protein   CREAT   Urine HCG        07/31/23 0542 Alexsandra   Clear   Negative   Trace   Negative   Trace                 Microbiology Results (last 10 days)       ** No results found for the last 240 hours. **            CT Head Without Contrast    Result Date: 7/31/2023  Impression: 1.No acute intracranial abnormality is identified. 2.Periventricular white matter hypoattenuation may be related to chronic microvascular ischemic change. This is greater than expected given the patient's age. Follow-up MRI may be considered if clinically indicated. Electronically Signed: Timothy Rodriguez  7/31/2023 7:58 AM EDT  Workstation ID: [id number]                 Labs Pending at Discharge:      Procedures Performed           Consults:   Consults       Date and Time Order Name Status Description    7/31/2023  7:55 PM Inpatient Psychiatrist Consult      7/31/2023  2:45 PM Hospitalist (on-call MD unless specified)                Discharge Details        Discharge Medications        New Medications        Instructions Start Date   ARIPiprazole 5 MG tablet  Commonly known as: ABILIFY   5 mg, Oral, Nightly      levothyroxine 50 MCG tablet  Commonly known as: SYNTHROID, LEVOTHROID   50 mcg, Oral, Every Early Morning   Start Date: August 3, 2023     lisinopril 5 MG tablet  Commonly known as: PRINIVIL,ZESTRIL   5 mg, Oral, Every 24 Hours Scheduled   Start Date: August 3, 2023              Allergies   Allergen Reactions    Cephalexin Unknown (See Comments)    Ibuprofen Unknown (See Comments)         Discharge Disposition:   Home or Self Care    Diet:  Hospital:  Diet Order   Procedures    Diet: Diabetic Diets; Consistent Carbohydrate; Texture: Regular Texture (IDDSI 7); Fluid Consistency: Thin (IDDSI 0)         Discharge Activity:         CODE STATUS:  Code Status and Medical Interventions:    Ordered at: 08/01/23 1449     Code Status (Patient has no pulse and is not breathing):    CPR (Attempt to Resuscitate)     Medical Interventions (Patient has pulse or is breathing):    Full Support     Release to patient:    Routine Release         No future appointments.        Time spent on Discharge including face to face service:  35 minutes    Signature: Electronically signed by Roque Galarza MD, 08/02/23, 14:55 EDT.  [facility]

## 2023-08-02 NOTE — SIGNIFICANT NOTE
No family to reach of discharge. Patient states there is no one to contact, left messages on phone numbers that were listed in computer.no answer

## 2023-08-02 NOTE — PLAN OF CARE
Problem: Adult Inpatient Plan of Care  Goal: Plan of Care Review  Outcome: Met  Flowsheets (Taken 8/2/2023 6142)  Plan of Care Reviewed With: patient  Outcome Evaluation: patient discharging home via cab service.  Goal: Patient-Specific Goal (Individualized)  Outcome: Met  Goal: Absence of Hospital-Acquired Illness or Injury  Outcome: Met  Goal: Optimal Comfort and Wellbeing  Outcome: Met  Goal: Readiness for Transition of Care  Outcome: Met   Goal Outcome Evaluation:  Plan of Care Reviewed With: patient           Outcome Evaluation: patient discharging home via cab service.

## 2023-08-02 NOTE — CASE MANAGEMENT/SOCIAL WORK
Social Work Assessment  [facility]     Patient Name: Simran Wall  MRN: [id number]  Today's Date: 8/2/2023    Admit Date: 7/31/2023     Discharge Plan       Row Name 08/02/23 1305       Plan    Plan Comments SW called Medicaid transport and scheduled ride home.  Trip ID [id number].  [id number].  They are to call nurse's station when they are on the way.      Row Name 08/02/23 1021       Plan    Plan Return home. Refused interview    Plan Comments Discharge orders for today. Refused to talk to ALDO James LCSW    Office: [phone]  Fax: [phone]  [email or web address]

## 2023-08-02 NOTE — PROGRESS NOTES
"  Chief complaint \"I dont like to talk\"     Subjective .     History of present illness:  The patient is a 53 y.o. female who was admitted secondary to mental status changes. Pmhx: asthma, depression, meth dependence. Psych consult was requested by Dr Mitch schultz to hallucinations, paranoia, substance dependence.  The pt was limited historian, she was very guarded, provided limited answers, was getting agitated when more questions asked. The pt acknowledged long hx of depression, was on prozac and sertraline in the past, reported no improvement on meds, however, she had sporadic compliance with meds  The pt also has very limited recollection about events leading to the admission, she acknowledged multiple stressors, but was not willing to elaborate on details, the pt has very poor social support. The pt lives alone, very isolative her herself, watching TV and using meth on the daily basis.  The pt was not wiling to elaborate on details, stated \"too many question\" and \" I dont like to talk to people\"   Per records,  the pt was seen in the ER on the day of admission, was d/c completely clothed but was found   in the surgical department with only a towel wrapped around her otherwise completely naked.  When asked why the patient was in the surgical department she states \"I do not know\".  When asked what she was doing she states \"I did not do what you accused me of doing\".   Past psych hx:depression, meth dependence     Today the pt was alert and awake, still very limited interaction with staff, focused on d/c, will be d/c via cab service , the pt feels comfortable with d/c, was not willing to accept CD tx, has very poor insight about meth use. The pt denied AVH/Si/Hi  Earlier today the pt was walking in the hallway [facility] but she was completely aware of what she was doing          Review of Systems   Pertinent items are noted in HPI, all other systems reviewed and negative    History          No " "medications prior to admission.        Scheduled Meds:  ARIPiprazole, 5 mg, Oral, Nightly  insulin lispro, 2-9 Units, Subcutaneous, 4x Daily AC & at Bedtime  levothyroxine, 50 mcg, Oral, Q AM  lisinopril, 5 mg, Oral, Q24H  senna-docusate sodium, 2 tablet, Oral, BID  sodium chloride, 10 mL, Intravenous, Q12H         Continuous Infusions:       PRN Meds:    senna-docusate sodium **AND** polyethylene glycol **AND** bisacodyl **AND** bisacodyl    Calcium Replacement - Follow Nurse / BPA Driven Protocol    dextrose    dextrose    glucagon (human recombinant)    HYDROcodone-acetaminophen    Magnesium Standard Dose Replacement - Follow Nurse / BPA Driven Protocol    nitroglycerin    Phosphorus Replacement - Follow Nurse / BPA Driven Protocol    Potassium Replacement - Follow Nurse / BPA Driven Protocol    risperiDONE    [COMPLETED] Insert Peripheral IV **AND** sodium chloride    sodium chloride    sodium chloride      Allergies:  Cephalexin and Ibuprofen      Objective     Vital Signs   /65   Pulse 77   Temp 98.4 øF (36.9 øC)   Resp 18   Ht 157.5 cm (62\")   Wt 97.8 kg (215 lb 9.8 oz)   SpO2 95%   BMI 39.44 kg/mý     Physical Exam:     General Appearance:    In NAD         Mental Status Exam:    Hygiene:   fair  Cooperation:   limited   Eye Contact:  Fair  Psychomotor Behavior:  Appropriate  Affect:  Restricted  Hopelessness: Denies  Speech:  Normal  Thought Progress:  Goal directed and Linear  Thought Content:  Mood congruent  Suicidal:  None  Homicidal:  None  Hallucinations:  None  Delusion:  None  Memory:   fair   Orientation:  Person, Place, Time, and Situation  Reliability:  fair  Insight:  Poor  Judgement:  Impaired  Impulse Control:  Poor  Physical/Medical Issues:  Yes      Medications and allergies reviewed      Lab Results   Component Value Date    GLUCOSE 221 (H) 08/01/2023    CALCIUM 8.6 08/01/2023     08/01/2023    K 4.1 08/01/2023    CO2 25.0 08/01/2023     08/01/2023    BUN 13 " 08/01/2023    CREATININE 0.76 08/01/2023    EGFRIFNONA 130 05/24/2021    BCR 17.1 08/01/2023    ANIONGAP 11.0 08/01/2023       Last Urine Toxicity  More data may exist         Latest Ref Rng & Units 7/31/2023 7/15/2018   LAST URINE TOXICITY RESULTS   Creatinine, Urine mg/dL - 57.8Urine creatinines <20 mg/dL may express a dilution effect which can cause false negatives for the drug screen.    Amphetamine, Urine Qual NEGATIVE - NEGATIVE    Barbiturates Screen, Urine Negative Negative  NEGATIVE    Benzodiazepine Screen, Urine Negative Negative  NEGATIVE    Cocaine Screen, Urine Negative Negative  NEGATIVE    Methadone Screen , Urine Negative Negative  NEGATIVE        No results found for: PHENYTOIN, PHENOBARB, VALPROATE, CBMZ    Lab Results   Component Value Date     08/01/2023    BUN 13 08/01/2023    CREATININE 0.76 08/01/2023    TSH 30.320 (H) 07/31/2023    WBC 11.00 (H) 07/31/2023       Brief Urine Lab Results  (Last result in the past 365 days)        Color   Clarity   Blood   Leuk Est   Nitrite   Protein   CREAT   Urine HCG        07/31/23 0542 Alexsandra   Clear   Negative   Trace   Negative   Trace                   Assessment & Plan       Mental status alteration    Type 2 diabetes mellitus    Hypothyroidism    Hypertension    Delirium          Assessment:  methamphetamine dependence , substance induced mood d/o    Treatment Plan:    the pt presented with the sxs of chemical dependence, mood d/o  The pt was confused and displayed psychotic sxs (when intoxicated with meth)   The pt was started on risperidone  Cont ability 5 mg po QHS (better tolerated)   The pt will benefit from psych f/u, CD treatment, but the pt has no desire to get help and stop using meth   The pt can be d/c when medically stable    Treatment Plan discussed with: Patient and nursing     I discussed the patients findings and my recommendations with patient and nursing staff    I have reviewed and approved the behavioral health treatment  plans and problem list. Yes     Referring MD has access to consult report and progress notes in EMR     Chikis Ny MD  08/02/23  14:34 EDT

## 2023-08-02 NOTE — PLAN OF CARE
Goal Outcome Evaluation:              Outcome Evaluation: PT pleasant but uncooperative this shift. PT refused all medication. PT alert and able to make wants and needs known. PT refused all monitoring devises this shift. No s/sx of acute distress noted at this time.

## 2023-08-03 ENCOUNTER — READMISSION MANAGEMENT (OUTPATIENT)
Dept: CALL CENTER | Facility: HOSPITAL | Age: 53
End: 2023-08-03
Payer: MEDICARE

## 2023-08-06 ENCOUNTER — HOSPITAL ENCOUNTER (EMERGENCY)
Facility: HOSPITAL | Age: 53
Discharge: HOME | End: 2023-08-07
Attending: EMERGENCY MEDICINE | Admitting: EMERGENCY MEDICINE
Payer: MEDICARE

## 2023-08-06 DIAGNOSIS — F15.929 METHAMPHETAMINE INTOXICATION: ICD-10-CM

## 2023-08-06 DIAGNOSIS — F15.10 METHAMPHETAMINE ABUSE: Primary | ICD-10-CM

## 2023-08-06 DIAGNOSIS — F15.20 METHAMPHETAMINE USE DISORDER, SEVERE: ICD-10-CM

## 2023-08-06 LAB
ALBUMIN SERPL-MCNC: 4.4 G/DL (ref 3.5–5.2)
ALBUMIN/GLOB SERPL: 1.6 G/DL
ALP SERPL-CCNC: 75 U/L (ref 39–117)
ALT SERPL W P-5'-P-CCNC: 15 U/L (ref 1–33)
AMPHET+METHAMPHET UR QL: POSITIVE
ANION GAP SERPL CALCULATED.3IONS-SCNC: 16 MMOL/L (ref 5–15)
AST SERPL-CCNC: 18 U/L (ref 1–32)
BARBITURATES UR QL SCN: NEGATIVE
BASOPHILS # BLD AUTO: 0.1 10*3/MM3 (ref 0–0.2)
BASOPHILS NFR BLD AUTO: 1 % (ref 0–1.5)
BENZODIAZ UR QL SCN: NEGATIVE
BILIRUB SERPL-MCNC: 0.3 MG/DL (ref 0–1.2)
BILIRUB UR QL STRIP: NEGATIVE
BUN SERPL-MCNC: 15 MG/DL (ref 6–20)
BUN/CREAT SERPL: 18.5 (ref 7–25)
CALCIUM SPEC-SCNC: 9.4 MG/DL (ref 8.6–10.5)
CANNABINOIDS SERPL QL: NEGATIVE
CHLORIDE SERPL-SCNC: 100 MMOL/L (ref 98–107)
CK SERPL-CCNC: 306 U/L (ref 20–180)
CLARITY UR: CLEAR
CO2 SERPL-SCNC: 23 MMOL/L (ref 22–29)
COCAINE UR QL: NEGATIVE
COLOR UR: YELLOW
CREAT SERPL-MCNC: 0.81 MG/DL (ref 0.57–1)
DEPRECATED RDW RBC AUTO: 43.3 FL (ref 37–54)
EGFRCR SERPLBLD CKD-EPI 2021: 85.9 ML/MIN/1.73
EOSINOPHIL # BLD AUTO: 0.1 10*3/MM3 (ref 0–0.4)
EOSINOPHIL NFR BLD AUTO: 0.7 % (ref 0.3–6.2)
ERYTHROCYTE [DISTWIDTH] IN BLOOD BY AUTOMATED COUNT: 14 % (ref 12.3–15.4)
ETHANOL UR QL: <0.01 %
GLOBULIN UR ELPH-MCNC: 2.8 GM/DL
GLUCOSE SERPL-MCNC: 220 MG/DL (ref 65–99)
GLUCOSE UR STRIP-MCNC: ABNORMAL MG/DL
HCT VFR BLD AUTO: 41.3 % (ref 34–46.6)
HGB BLD-MCNC: 13.7 G/DL (ref 12–15.9)
HGB UR QL STRIP.AUTO: NEGATIVE
KETONES UR QL STRIP: ABNORMAL
LEUKOCYTE ESTERASE UR QL STRIP.AUTO: NEGATIVE
LYMPHOCYTES # BLD AUTO: 4.3 10*3/MM3 (ref 0.7–3.1)
LYMPHOCYTES NFR BLD AUTO: 41.2 % (ref 19.6–45.3)
MAGNESIUM SERPL-MCNC: 1.7 MG/DL (ref 1.6–2.6)
MCH RBC QN AUTO: 29.2 PG (ref 26.6–33)
MCHC RBC AUTO-ENTMCNC: 33.2 G/DL (ref 31.5–35.7)
MCV RBC AUTO: 87.8 FL (ref 79–97)
METHADONE UR QL SCN: NEGATIVE
MONOCYTES # BLD AUTO: 0.7 10*3/MM3 (ref 0.1–0.9)
MONOCYTES NFR BLD AUTO: 7.2 % (ref 5–12)
NEUTROPHILS NFR BLD AUTO: 49.9 % (ref 42.7–76)
NEUTROPHILS NFR BLD AUTO: 5.2 10*3/MM3 (ref 1.7–7)
NITRITE UR QL STRIP: NEGATIVE
NRBC BLD AUTO-RTO: 0 /100 WBC (ref 0–0.2)
OPIATES UR QL: NEGATIVE
OXYCODONE UR QL SCN: NEGATIVE
PH UR STRIP.AUTO: 5.5 [PH] (ref 5–8)
PLATELET # BLD AUTO: 303 10*3/MM3 (ref 140–450)
PMV BLD AUTO: 8.8 FL (ref 6–12)
POTASSIUM SERPL-SCNC: 3.5 MMOL/L (ref 3.5–5.2)
PROT SERPL-MCNC: 7.2 G/DL (ref 6–8.5)
PROT UR QL STRIP: ABNORMAL
RBC # BLD AUTO: 4.7 10*6/MM3 (ref 3.77–5.28)
SODIUM SERPL-SCNC: 139 MMOL/L (ref 136–145)
SP GR UR STRIP: 1.03 (ref 1–1.03)
TROPONIN T SERPL HS-MCNC: 16 NG/L
UROBILINOGEN UR QL STRIP: ABNORMAL
WBC NRBC COR # BLD: 10.4 10*3/MM3 (ref 3.4–10.8)

## 2023-08-06 PROCEDURE — 85025 COMPLETE CBC W/AUTO DIFF WBC: CPT | Performed by: EMERGENCY MEDICINE

## 2023-08-06 PROCEDURE — 93005 ELECTROCARDIOGRAM TRACING: CPT

## 2023-08-06 PROCEDURE — 80307 DRUG TEST PRSMV CHEM ANLYZR: CPT | Performed by: EMERGENCY MEDICINE

## 2023-08-06 PROCEDURE — 96374 THER/PROPH/DIAG INJ IV PUSH: CPT

## 2023-08-06 PROCEDURE — 82550 ASSAY OF CK (CPK): CPT | Performed by: EMERGENCY MEDICINE

## 2023-08-06 PROCEDURE — 83735 ASSAY OF MAGNESIUM: CPT | Performed by: EMERGENCY MEDICINE

## 2023-08-06 PROCEDURE — 25010000002 LORAZEPAM PER 2 MG: Performed by: EMERGENCY MEDICINE

## 2023-08-06 PROCEDURE — 82077 ASSAY SPEC XCP UR&BREATH IA: CPT | Performed by: EMERGENCY MEDICINE

## 2023-08-06 PROCEDURE — 96361 HYDRATE IV INFUSION ADD-ON: CPT

## 2023-08-06 PROCEDURE — P9612 CATHETERIZE FOR URINE SPEC: HCPCS

## 2023-08-06 PROCEDURE — 84484 ASSAY OF TROPONIN QUANT: CPT | Performed by: EMERGENCY MEDICINE

## 2023-08-06 PROCEDURE — 81003 URINALYSIS AUTO W/O SCOPE: CPT | Performed by: EMERGENCY MEDICINE

## 2023-08-06 PROCEDURE — 99283 EMERGENCY DEPT VISIT LOW MDM: CPT

## 2023-08-06 PROCEDURE — 80053 COMPREHEN METABOLIC PANEL: CPT | Performed by: EMERGENCY MEDICINE

## 2023-08-06 RX ORDER — LORAZEPAM 2 MG/ML
1 INJECTION INTRAMUSCULAR ONCE
Status: COMPLETED | OUTPATIENT
Start: 2023-08-06 | End: 2023-08-06

## 2023-08-06 RX ADMIN — SODIUM CHLORIDE, POTASSIUM CHLORIDE, SODIUM LACTATE AND CALCIUM CHLORIDE 500 ML: 600; 310; 30; 20 INJECTION, SOLUTION INTRAVENOUS at 22:18

## 2023-08-06 RX ADMIN — SODIUM CHLORIDE 1500 ML: 9 INJECTION, SOLUTION INTRAVENOUS at 20:55

## 2023-08-06 RX ADMIN — LORAZEPAM 1 MG: 2 INJECTION INTRAMUSCULAR; INTRAVENOUS at 20:51

## 2023-08-07 VITALS
SYSTOLIC BLOOD PRESSURE: 149 MMHG | OXYGEN SATURATION: 95 % | BODY MASS INDEX: 38.09 KG/M2 | WEIGHT: 207 LBS | HEART RATE: 82 BPM | DIASTOLIC BLOOD PRESSURE: 80 MMHG | TEMPERATURE: 98.6 F | HEIGHT: 62 IN | RESPIRATION RATE: 20 BRPM

## 2023-08-07 NOTE — ED PROVIDER NOTES
Subjective   History of Present Illness  53-year-old female was brought in by EMS after police found her sitting unrestrained in a nondamage to vehicle that was parked on the sidewalk and partially on [address].  The patient indicates she may have used some methamphetamine today.  The patient was apparently seen recently in the ER for methamphetamine use.  Patient reports no intent to self-harm or harm others.  She reportedly appeared to be hallucinating at the scene.  She denies current auditory hallucination but states she feels restless    Review of Systems   Unable to perform ROS: Other (Methamphetamine intoxication)   Constitutional:  Negative for chills.     No past medical history on file.  History of methamphetamine abuse, history of hypertension, history of hypothyroidism, history of depression, history of substance use disorder  Allergies   Allergen Reactions    Cephalexin Unknown (See Comments)    Ibuprofen Unknown (See Comments)       No past surgical history on file.    No family history on file.    Social History     Socioeconomic History    Marital status:    Tobacco Use    Smoking status: Never    Smokeless tobacco: Never   Vaping Use    Vaping Use: Never used   Substance and Sexual Activity    Alcohol use: Never    Drug use: Never     Comment: Pt states no drug use, tox screen positive amphetamines    Sexual activity: Defer       No reported safety issues    Objective   Physical Exam  Alert, agitated, initially confused T[place]oy Coma Scale 14   HEENT: Pupils equal and reactive to light. Conjunctivae are not injected. Normal tympanic membranes. Oropharynx and nares are normal.   Neck: Supple. Midline trachea. No JVD. No goiter.   Chest: Clear and equal breath sounds bilaterally, regular rate and rhythm without murmur or rub.   Abdomen: Positive bowel sounds, nontender, nondistended. No rebound or peritoneal signs. No CVA tenderness.   Extremities/neuro no focal neurologic defects cranial  nerves intact no clubbing. cyanosis or edema. Motor sensory exam is normal. The full range of motion is intact   Skin: Warm and dry, no rashes or petechia.   Lymphatic: No regional lymphadenopathy. No calf pain, swelling or Homans sign    Procedures           ED Course           Labs Reviewed   COMPREHENSIVE METABOLIC PANEL - Abnormal; Notable for the following components:       Result Value    Glucose 220 (*)     Anion Gap 16.0 (*)     All other components within normal limits    Narrative:     GFR Normal >60  Chronic Kidney Disease <60  Kidney Failure <15     URINALYSIS W/ CULTURE IF INDICATED - Abnormal; Notable for the following components:    Specific Gravity, UA 1.034 (*)     Glucose, UA >=1000 mg/dL (3+) (*)     Ketones, UA Trace (*)     Protein, UA Trace (*)     All other components within normal limits    Narrative:     In absence of clinical symptoms, the presence of pyuria, bacteria, and/or nitrites on the urinalysis result does not correlate with infection.  Urine microscopic not indicated.   SINGLE HSTROPONIN T - Abnormal; Notable for the following components:    HS Troponin T 16 (*)     All other components within normal limits    Narrative:     High Sensitive Troponin T Reference Range:  <10.0 ng/L- Negative Female for AMI  <15.0 ng/L- Negative Male for AMI  >=10 - Abnormal Female indicating possible myocardial injury.  >=15 - Abnormal Male indicating possible myocardial injury.   Clinicians would have to utilize clinical acumen, EKG, Troponin, and serial changes to determine if it is an Acute Myocardial Infarction or myocardial injury due to an underlying chronic condition.        CK - Abnormal; Notable for the following components:    Creatine Kinase 306 (*)     All other components within normal limits   CBC WITH AUTO DIFFERENTIAL - Abnormal; Notable for the following components:    Lymphocytes, Absolute 4.30 (*)     All other components within normal limits   URINE DRUG SCREEN - Abnormal; Notable  for the following components:    Amphet/Methamphet, Screen Positive (*)     All other components within normal limits    Narrative:     Negative Thresholds Per Drugs Screened:    Amphetamines                 500 ng/ml  Barbiturates                 200 ng/ml  Benzodiazepines              100 ng/ml  Cocaine                      300 ng/ml  Methadone                    300 ng/ml  Opiates                      300 ng/ml  Oxycodone                    100 ng/ml  THC                           50 ng/ml    The Normal Value for all drugs tested is negative. This report includes final unconfirmed screening results to be used for medical treatment purposes only. Unconfirmed results must not be used for non-medical purposes such as employment or legal testing. Clinical consideration should be applied to any drug of abuse test, particularly when unconfirmed results are used.          All urine drugs of abuse requests without chain of custody are for medical screening purposes only.  False positives are possible.     MAGNESIUM - Normal   ETHANOL    Narrative:     Plasma Ethanol Clinical Symptoms:    ETOH (%)               Clinical Symptom  .01-.05              No apparent influence  .03-.12              Euphoria, Diminished judgment and attention   .09-.25              Impaired comprehension, Muscle incoordination  .18-.30              Confusion, Staggered gait, Slurred speech  .25-.40              Markedly decreased response to stimuli, unable to stand or                        walk, vomitting, sleep or stupor  .35-.50              Comatose, Anesthesia, Subnormal body temperature       CBC AND DIFFERENTIAL    Narrative:     The following orders were created for panel order CBC & Differential.  Procedure                               Abnormality         Status                     ---------                               -----------         ------                     CBC Auto Differential[[id number]]        Abnormal            Final  result                 Please view results for these tests on the individual orders.     Medications   sodium chloride 0.9% - IBW for BMI > 30 bolus 1,500 mL (1,500 mL Intravenous New Bag 8/6/23 2055)   lactated ringers bolus 500 mL (has no administration in time range)   LORazepam (ATIVAN) injection 1 mg (1 mg Intravenous Given 8/6/23 2051)     No radiology results for the last day                                  Medical Decision Making  The patient was given substance abuse referral.  The patient is completing hydration and will be discharged.  The patient was no longer agitated.  He was agreeable to this plan of treatment    Amount and/or Complexity of Data Reviewed  Labs: ordered. Decision-making details documented in ED Course.    Risk  Prescription drug management.        Final diagnoses:   Methamphetamine abuse   Methamphetamine use disorder, severe   Methamphetamine intoxication       ED Disposition  ED Disposition       ED Disposition   Discharge    Condition   Stable    Comment   --               John Martino MD  [address] [place] [facility]  [phone]          For help with substance use disorder treatment call  [phone]             Medication List      No changes were made to your prescriptions during this visit.            Gavin Michelle MD  08/06/23 4268

## 2023-08-08 LAB — QT INTERVAL: 361 MS

## 2023-08-08 NOTE — PROGRESS NOTES
"Enter Query Response Below      Query Response: Methamphetamine abuse, POA             If applicable, please update the problem list.   Patient: Simran Wall        : 1970  Account: [id number]           Admit Date: 2023        How to Respond to this query:       a. Click New Note     b. Answer query within the yellow box.                c. Update the Problem List, if applicable.    Dr. Galarza,    Patient presents with confusion. Per the H&P \"Patient was seen earlier today in ER for altered mental status, paranoia thought to be secondary to methamphetamine use but was discharged. Substance abuse-methamphetamines, possible underlying depression.\" The patient refused Abilify and Geodon. Psychiatry Consulted on , diagnosed patient with \"methamphetamine dependence, substance induced mood d/o.\" The patient was treated with NaCL fluids and Psychiatry Consult. Discharge Summary with \"Delirium due to substance abuse-methamphetamine.\"    Please clarify conflicting documentation as:  -Methamphetamine Abuse  -Methamphetamine Dependence  -Other- specify__________  -Unable to determine    By submitting this query, we are merely seeking further clarification of documentation to accurately reflect all conditions that you are monitoring, evaluating, treating or that extend the hospitalization or utilize additional resources of care. Please utilize your independent clinical judgment when addressing the question(s) above.     This query and your response, once completed, will be entered into the legal medical record.    Sincerely,  Lola Olivarez RN, BSN  [email or web address]  Clinical Documentation Integrity Program   "

## 2023-08-11 ENCOUNTER — APPOINTMENT (OUTPATIENT)
Dept: CT IMAGING | Facility: HOSPITAL | Age: 53
End: 2023-08-11
Payer: MEDICARE

## 2023-08-11 ENCOUNTER — HOSPITAL ENCOUNTER (OUTPATIENT)
Facility: HOSPITAL | Age: 53
Setting detail: OBSERVATION
Discharge: HOME | End: 2023-08-15
Attending: EMERGENCY MEDICINE | Admitting: HOSPITALIST
Payer: MEDICARE

## 2023-08-11 DIAGNOSIS — F15.90 AMPHETAMINE USE: Primary | ICD-10-CM

## 2023-08-11 DIAGNOSIS — R41.82 ALTERED MENTAL STATUS, UNSPECIFIED ALTERED MENTAL STATUS TYPE: ICD-10-CM

## 2023-08-11 LAB
ALBUMIN SERPL-MCNC: 4.2 G/DL (ref 3.5–5.2)
ALBUMIN/GLOB SERPL: 1.5 G/DL
ALP SERPL-CCNC: 76 U/L (ref 39–117)
ALT SERPL W P-5'-P-CCNC: 13 U/L (ref 1–33)
AMPHET+METHAMPHET UR QL: POSITIVE
ANION GAP SERPL CALCULATED.3IONS-SCNC: 14 MMOL/L (ref 5–15)
AST SERPL-CCNC: 17 U/L (ref 1–32)
BACTERIA UR QL AUTO: ABNORMAL /HPF
BARBITURATES UR QL SCN: NEGATIVE
BASOPHILS # BLD AUTO: 0.2 10*3/MM3 (ref 0–0.2)
BASOPHILS NFR BLD AUTO: 1.6 % (ref 0–1.5)
BENZODIAZ UR QL SCN: NEGATIVE
BILIRUB SERPL-MCNC: 0.4 MG/DL (ref 0–1.2)
BILIRUB UR QL STRIP: ABNORMAL
BUN SERPL-MCNC: 12 MG/DL (ref 6–20)
BUN/CREAT SERPL: 14.1 (ref 7–25)
CALCIUM SPEC-SCNC: 9.1 MG/DL (ref 8.6–10.5)
CANNABINOIDS SERPL QL: NEGATIVE
CHLORIDE SERPL-SCNC: 102 MMOL/L (ref 98–107)
CLARITY UR: ABNORMAL
CO2 SERPL-SCNC: 24 MMOL/L (ref 22–29)
COCAINE UR QL: NEGATIVE
COLOR UR: ABNORMAL
CREAT SERPL-MCNC: 0.85 MG/DL (ref 0.57–1)
DEPRECATED RDW RBC AUTO: 43.8 FL (ref 37–54)
EGFRCR SERPLBLD CKD-EPI 2021: 81 ML/MIN/1.73
EOSINOPHIL # BLD AUTO: 0.2 10*3/MM3 (ref 0–0.4)
EOSINOPHIL NFR BLD AUTO: 1.6 % (ref 0.3–6.2)
ERYTHROCYTE [DISTWIDTH] IN BLOOD BY AUTOMATED COUNT: 13.7 % (ref 12.3–15.4)
ETHANOL UR QL: <0.01 %
GLOBULIN UR ELPH-MCNC: 2.8 GM/DL
GLUCOSE SERPL-MCNC: 182 MG/DL (ref 65–99)
GLUCOSE UR STRIP-MCNC: ABNORMAL MG/DL
HCT VFR BLD AUTO: 42.2 % (ref 34–46.6)
HGB BLD-MCNC: 14.4 G/DL (ref 12–15.9)
HGB UR QL STRIP.AUTO: NEGATIVE
HYALINE CASTS UR QL AUTO: ABNORMAL /LPF
KETONES UR QL STRIP: ABNORMAL
LEUKOCYTE ESTERASE UR QL STRIP.AUTO: ABNORMAL
LYMPHOCYTES # BLD AUTO: 3.8 10*3/MM3 (ref 0.7–3.1)
LYMPHOCYTES NFR BLD AUTO: 35 % (ref 19.6–45.3)
MCH RBC QN AUTO: 29.6 PG (ref 26.6–33)
MCHC RBC AUTO-ENTMCNC: 34 G/DL (ref 31.5–35.7)
MCV RBC AUTO: 87 FL (ref 79–97)
METHADONE UR QL SCN: NEGATIVE
MONOCYTES # BLD AUTO: 0.9 10*3/MM3 (ref 0.1–0.9)
MONOCYTES NFR BLD AUTO: 7.8 % (ref 5–12)
MUCOUS THREADS URNS QL MICRO: ABNORMAL /HPF
NEUTROPHILS NFR BLD AUTO: 5.9 10*3/MM3 (ref 1.7–7)
NEUTROPHILS NFR BLD AUTO: 54 % (ref 42.7–76)
NITRITE UR QL STRIP: POSITIVE
NRBC BLD AUTO-RTO: 0.2 /100 WBC (ref 0–0.2)
OPIATES UR QL: NEGATIVE
OXYCODONE UR QL SCN: NEGATIVE
PH UR STRIP.AUTO: 5.5 [PH] (ref 5–8)
PLAT MORPH BLD: NORMAL
PLATELET # BLD AUTO: 283 10*3/MM3 (ref 140–450)
PMV BLD AUTO: 9 FL (ref 6–12)
POTASSIUM SERPL-SCNC: 3.4 MMOL/L (ref 3.5–5.2)
PROT SERPL-MCNC: 7 G/DL (ref 6–8.5)
PROT UR QL STRIP: ABNORMAL
RBC # BLD AUTO: 4.86 10*6/MM3 (ref 3.77–5.28)
RBC # UR STRIP: ABNORMAL /HPF
RBC MORPH BLD: NORMAL
REF LAB TEST METHOD: ABNORMAL
SALICYLATES SERPL-MCNC: <0.3 MG/DL
SODIUM SERPL-SCNC: 140 MMOL/L (ref 136–145)
SP GR UR STRIP: 1.03 (ref 1–1.03)
SQUAMOUS #/AREA URNS HPF: ABNORMAL /HPF
UROBILINOGEN UR QL STRIP: ABNORMAL
WBC # UR STRIP: ABNORMAL /HPF
WBC MORPH BLD: NORMAL
WBC NRBC COR # BLD: 11 10*3/MM3 (ref 3.4–10.8)
WHOLE BLOOD HOLD SPECIMEN: NORMAL

## 2023-08-11 PROCEDURE — 80307 DRUG TEST PRSMV CHEM ANLYZR: CPT | Performed by: NURSE PRACTITIONER

## 2023-08-11 PROCEDURE — 82077 ASSAY SPEC XCP UR&BREATH IA: CPT | Performed by: NURSE PRACTITIONER

## 2023-08-11 PROCEDURE — 99284 EMERGENCY DEPT VISIT MOD MDM: CPT

## 2023-08-11 PROCEDURE — 85025 COMPLETE CBC W/AUTO DIFF WBC: CPT | Performed by: NURSE PRACTITIONER

## 2023-08-11 PROCEDURE — 70450 CT HEAD/BRAIN W/O DYE: CPT

## 2023-08-11 PROCEDURE — 85007 BL SMEAR W/DIFF WBC COUNT: CPT | Performed by: NURSE PRACTITIONER

## 2023-08-11 PROCEDURE — 80179 DRUG ASSAY SALICYLATE: CPT | Performed by: NURSE PRACTITIONER

## 2023-08-11 PROCEDURE — 80053 COMPREHEN METABOLIC PANEL: CPT | Performed by: NURSE PRACTITIONER

## 2023-08-11 PROCEDURE — 36415 COLL VENOUS BLD VENIPUNCTURE: CPT

## 2023-08-11 PROCEDURE — 81001 URINALYSIS AUTO W/SCOPE: CPT | Performed by: NURSE PRACTITIONER

## 2023-08-11 RX ORDER — SODIUM CHLORIDE 0.9 % (FLUSH) 0.9 %
10 SYRINGE (ML) INJECTION AS NEEDED
Status: DISCONTINUED | OUTPATIENT
Start: 2023-08-11 | End: 2023-08-15 | Stop reason: HOSPADM

## 2023-08-11 RX ORDER — NITROFURANTOIN 25; 75 MG/1; MG/1
100 CAPSULE ORAL ONCE
Status: COMPLETED | OUTPATIENT
Start: 2023-08-11 | End: 2023-08-11

## 2023-08-11 RX ADMIN — NITROFURANTOIN MONOHYDRATE/MACROCRYSTALS 100 MG: 75; 25 CAPSULE ORAL at 23:42

## 2023-08-11 NOTE — Clinical Note
Level of Care: Telemetry [5]   Admitting Physician: GAB KAUFFMAN [[id number]]   Attending Physician: GAB KAUFFMAN [[id number]]

## 2023-08-12 PROBLEM — N39.0 UTI (URINARY TRACT INFECTION): Status: ACTIVE | Noted: 2023-08-12

## 2023-08-12 PROBLEM — F19.10 DRUG ABUSE: Status: ACTIVE | Noted: 2023-08-12

## 2023-08-12 LAB
BASOPHILS # BLD AUTO: 0 10*3/MM3 (ref 0–0.2)
BASOPHILS NFR BLD AUTO: 0.5 % (ref 0–1.5)
DEPRECATED RDW RBC AUTO: 43.8 FL (ref 37–54)
EOSINOPHIL # BLD AUTO: 0.2 10*3/MM3 (ref 0–0.4)
EOSINOPHIL NFR BLD AUTO: 2.5 % (ref 0.3–6.2)
ERYTHROCYTE [DISTWIDTH] IN BLOOD BY AUTOMATED COUNT: 14 % (ref 12.3–15.4)
HCT VFR BLD AUTO: 39.9 % (ref 34–46.6)
HGB BLD-MCNC: 13.1 G/DL (ref 12–15.9)
LYMPHOCYTES # BLD AUTO: 3.4 10*3/MM3 (ref 0.7–3.1)
LYMPHOCYTES NFR BLD AUTO: 49.6 % (ref 19.6–45.3)
MCH RBC QN AUTO: 29.1 PG (ref 26.6–33)
MCHC RBC AUTO-ENTMCNC: 32.7 G/DL (ref 31.5–35.7)
MCV RBC AUTO: 89 FL (ref 79–97)
MONOCYTES # BLD AUTO: 0.5 10*3/MM3 (ref 0.1–0.9)
MONOCYTES NFR BLD AUTO: 7 % (ref 5–12)
NEUTROPHILS NFR BLD AUTO: 2.8 10*3/MM3 (ref 1.7–7)
NEUTROPHILS NFR BLD AUTO: 40.4 % (ref 42.7–76)
NRBC BLD AUTO-RTO: 0.1 /100 WBC (ref 0–0.2)
PLATELET # BLD AUTO: 264 10*3/MM3 (ref 140–450)
PMV BLD AUTO: 8.9 FL (ref 6–12)
RBC # BLD AUTO: 4.49 10*6/MM3 (ref 3.77–5.28)
WBC NRBC COR # BLD: 6.9 10*3/MM3 (ref 3.4–10.8)

## 2023-08-12 PROCEDURE — 36415 COLL VENOUS BLD VENIPUNCTURE: CPT

## 2023-08-12 PROCEDURE — 85025 COMPLETE CBC W/AUTO DIFF WBC: CPT

## 2023-08-12 PROCEDURE — G0378 HOSPITAL OBSERVATION PER HR: HCPCS

## 2023-08-12 PROCEDURE — 99222 1ST HOSP IP/OBS MODERATE 55: CPT

## 2023-08-12 PROCEDURE — 80048 BASIC METABOLIC PNL TOTAL CA: CPT

## 2023-08-12 RX ORDER — ACETAMINOPHEN 650 MG/1
650 SUPPOSITORY RECTAL EVERY 4 HOURS PRN
Status: DISCONTINUED | OUTPATIENT
Start: 2023-08-12 | End: 2023-08-15 | Stop reason: HOSPADM

## 2023-08-12 RX ORDER — ONDANSETRON 2 MG/ML
4 INJECTION INTRAMUSCULAR; INTRAVENOUS EVERY 6 HOURS PRN
Status: DISCONTINUED | OUTPATIENT
Start: 2023-08-12 | End: 2023-08-15 | Stop reason: HOSPADM

## 2023-08-12 RX ORDER — SODIUM CHLORIDE 0.9 % (FLUSH) 0.9 %
10 SYRINGE (ML) INJECTION EVERY 12 HOURS SCHEDULED
Status: DISCONTINUED | OUTPATIENT
Start: 2023-08-12 | End: 2023-08-15 | Stop reason: HOSPADM

## 2023-08-12 RX ORDER — SODIUM CHLORIDE 0.9 % (FLUSH) 0.9 %
10 SYRINGE (ML) INJECTION AS NEEDED
Status: DISCONTINUED | OUTPATIENT
Start: 2023-08-12 | End: 2023-08-15 | Stop reason: HOSPADM

## 2023-08-12 RX ORDER — LISINOPRIL 5 MG/1
5 TABLET ORAL
Status: DISCONTINUED | OUTPATIENT
Start: 2023-08-12 | End: 2023-08-15 | Stop reason: HOSPADM

## 2023-08-12 RX ORDER — AMOXICILLIN 250 MG
2 CAPSULE ORAL 2 TIMES DAILY PRN
Status: DISCONTINUED | OUTPATIENT
Start: 2023-08-12 | End: 2023-08-15 | Stop reason: HOSPADM

## 2023-08-12 RX ORDER — ACETAMINOPHEN 325 MG/1
650 TABLET ORAL EVERY 4 HOURS PRN
Status: DISCONTINUED | OUTPATIENT
Start: 2023-08-12 | End: 2023-08-15 | Stop reason: HOSPADM

## 2023-08-12 RX ORDER — CHOLECALCIFEROL (VITAMIN D3) 125 MCG
5 CAPSULE ORAL NIGHTLY PRN
Status: DISCONTINUED | OUTPATIENT
Start: 2023-08-12 | End: 2023-08-15 | Stop reason: HOSPADM

## 2023-08-12 RX ORDER — BISACODYL 10 MG
10 SUPPOSITORY, RECTAL RECTAL DAILY PRN
Status: DISCONTINUED | OUTPATIENT
Start: 2023-08-12 | End: 2023-08-15 | Stop reason: HOSPADM

## 2023-08-12 RX ORDER — SODIUM CHLORIDE 9 MG/ML
40 INJECTION, SOLUTION INTRAVENOUS AS NEEDED
Status: DISCONTINUED | OUTPATIENT
Start: 2023-08-12 | End: 2023-08-15 | Stop reason: HOSPADM

## 2023-08-12 RX ORDER — ONDANSETRON 4 MG/1
4 TABLET, FILM COATED ORAL EVERY 6 HOURS PRN
Status: DISCONTINUED | OUTPATIENT
Start: 2023-08-12 | End: 2023-08-15 | Stop reason: HOSPADM

## 2023-08-12 RX ORDER — ACETAMINOPHEN 160 MG/5ML
650 SOLUTION ORAL EVERY 4 HOURS PRN
Status: DISCONTINUED | OUTPATIENT
Start: 2023-08-12 | End: 2023-08-15 | Stop reason: HOSPADM

## 2023-08-12 RX ORDER — BISACODYL 5 MG/1
5 TABLET, DELAYED RELEASE ORAL DAILY PRN
Status: DISCONTINUED | OUTPATIENT
Start: 2023-08-12 | End: 2023-08-15 | Stop reason: HOSPADM

## 2023-08-12 RX ORDER — BUSPIRONE HYDROCHLORIDE 5 MG/1
5 TABLET ORAL 3 TIMES DAILY
Status: DISCONTINUED | OUTPATIENT
Start: 2023-08-12 | End: 2023-08-15 | Stop reason: HOSPADM

## 2023-08-12 RX ORDER — LEVOTHYROXINE SODIUM 0.05 MG/1
50 TABLET ORAL
Status: DISCONTINUED | OUTPATIENT
Start: 2023-08-13 | End: 2023-08-15 | Stop reason: HOSPADM

## 2023-08-12 RX ORDER — POLYETHYLENE GLYCOL 3350 17 G/17G
17 POWDER, FOR SOLUTION ORAL DAILY PRN
Status: DISCONTINUED | OUTPATIENT
Start: 2023-08-12 | End: 2023-08-15 | Stop reason: HOSPADM

## 2023-08-12 RX ORDER — ARIPIPRAZOLE 10 MG/1
5 TABLET ORAL NIGHTLY
Status: DISCONTINUED | OUTPATIENT
Start: 2023-08-12 | End: 2023-08-15 | Stop reason: HOSPADM

## 2023-08-12 RX ADMIN — ARIPIPRAZOLE 5 MG: 10 TABLET ORAL at 20:34

## 2023-08-12 RX ADMIN — BUSPIRONE HYDROCHLORIDE 5 MG: 5 TABLET ORAL at 20:35

## 2023-08-12 RX ADMIN — LISINOPRIL 5 MG: 5 TABLET ORAL at 20:35

## 2023-08-12 RX ADMIN — BUSPIRONE HYDROCHLORIDE 5 MG: 5 TABLET ORAL at 17:04

## 2023-08-12 NOTE — H&P
"    [facility] Medicine Services  History & Physical    Patient Name: Simran Wall  : 1970  MRN: [id number]  Primary Care Physician:  John Martino MD  Date of admission: 2023  Date and Time of Service: 23 at 2345    Subjective      Chief Complaint: \"I am scared\"    History of Present Illness: Simran Wall is a 53 y.o. female with a past medical history of mental illness, drug use (Meth), HTN, and Hypothyroidism who presented to [facility] on 2023 stating to the ED triage \"I am scared\".  She has refused to speak to the Nurse, ED provider and myself.  She did follow commands when asked to take medication.  When attempting to speak with her, she will close her eyes and turn away from the person talking.    In the ED, UA showed 3+ bacteria but was contaminated with 13-20 squamous epithelial cells. Ethanol was less than 0.010%. Tox screen was positive for methamphetamine. CT of the head showed no acute abnormality. She is afebrile, all vitals are stable. Hospitalist was consulted for further management.    On chart review, patient was discharged from [facility] on 23 after being treated for meth induced delirium.  She was initially discharged from the ED fully clothed but was found later wearing only a towel in the surgical department.  When brought back to the ED, she refused to state why she was in the surgical department. She was seen by psychiatry but refused CD tx so was cleared for DC.    Unable to perform ROS, patient refuses to speak or give any history as to why she came to the ED.    Personal History     No past medical history on file.    No past surgical history on file.    Family History: family history is not on file. Otherwise pertinent FHx was reviewed and not pertinent to current issue.    Social History:  reports that she has never smoked. She has never used smokeless tobacco. She reports that she does not drink alcohol and does not use " drugs.    Home Medications:  Prior to Admission Medications       Prescriptions Last Dose Informant Patient Reported? Taking?    ARIPiprazole (ABILIFY) 5 MG tablet   No No    Take 1 tablet by mouth Every Night.    levothyroxine (SYNTHROID, LEVOTHROID) 50 MCG tablet   No No    Take 1 tablet by mouth Every Morning.    lisinopril (PRINIVIL,ZESTRIL) 5 MG tablet   No No    Take 1 tablet by mouth Daily.              Allergies:  Allergies   Allergen Reactions    Cephalexin Unknown (See Comments)    Ibuprofen Unknown (See Comments)       Objective      Vitals:   Temp:  [98.4 øF (36.9 øC)] 98.4 øF (36.9 øC)  Heart Rate:  [88] 88  Resp:  [18] 18  BP: (136)/(90) 136/90    Physical Exam  Vitals and nursing note reviewed.   HENT:      Head: Normocephalic and atraumatic.      Nose: Nose normal.      Mouth/Throat:      Mouth: Mucous membranes are moist.   Eyes:      Extraocular Movements: Extraocular movements intact.      Pupils: Pupils are equal, round, and reactive to light.   Cardiovascular:      Rate and Rhythm: Normal rate and regular rhythm.   Musculoskeletal:         General: Normal range of motion.      Cervical back: Normal range of motion.   Skin:     General: Skin is warm and dry.   Neurological:      Mental Status: She is alert.      Comments: Follows commands but refuses to answer orientation questions   Psychiatric:         Attention and Perception: She is inattentive.         Mood and Affect: Affect is inappropriate.         Speech: She is noncommunicative.         Behavior: Behavior is uncooperative.      Comments: She refuses to speak but shakes her head yes or no.  She moves all extremities, no apparent weakness is noted.       Result Review    Result Review:  I have personally reviewed the results from the time of this admission to 8/12/2023 00:35 EDT and agree with these findings:  [x]  Laboratory  []  Microbiology  [x]  Radiology  []  EKG/Telemetry   []  Cardiology/Vascular   []  Pathology  []  Old  records  []  Other:  Most notable findings include: as above      Assessment & Plan        Active Hospital Problems:  Active Hospital Problems    Diagnosis     **UTI (urinary tract infection)     Drug abuse     Chronic mental illness      Plan:     UTI  -UA reviewed, squamous epithelial cells noted, 4+ bacteria  -Patient refuses to answer when asked about possible UTI symptoms including dysuria, frequency, urgency and abdominal pain  -1 Dose of antibiotics administered in ED    Chronic Mental Illness  Drug Abuse  -CT of the head reviewed  -Tox screen positive for meth  -Continue Abilify  -Psychiatry consult    Hypothyroidism  -Continue levothyroxine    Essential Hypertension  -Controlled  -Monitor BP  -Continue home Lisinopril      DVT prophylaxis:  Mechanical DVT prophylaxis ordered    CODE STATUS:       Admission Status:  I believe this patient meets observation status.    I discussed the patient's findings and my recommendations with patient.    This patient has been examined wearing appropriate Personal Protective Equipment  08/12/23      Signature: Electronically signed by Bárbara Silver DNP, APRN, 08/12/23, 00:35 EDT.  [facility] Hospitalist Team

## 2023-08-12 NOTE — ED NOTES
Patient refuses to allow this nurse to recollect labs. Refuses to speak to this nurse. Another nurse was asked to attempt to speak to patient and attempt blood draw and IV placement.

## 2023-08-12 NOTE — PROGRESS NOTES
[facility] Medicine Services   Daily Progress Note    Patient Name: Simran Wall  : 1970  MRN: [id number]  Primary Care Physician:  John Martino MD  Date of admission: 2023  Date and Time of Service: 9:27am      Subjective      Chief Complaint: anxiety    Pt seen and exmained  Feels very anxious  Objective      Vitals:   Temp:  [98.1 øF (36.7 øC)-98.4 øF (36.9 øC)] 98.1 øF (36.7 øC)  Heart Rate:  [84-88] 84  Resp:  [18-20] 20  BP: (136-153)/() 153/104    Physical Exam   GENERAL: NAD  HEENT: Nonicteric sclerae, PERRLA, EOMI. Oropharynx clear. Moist mucous membranes. Conjunctivae appear well perfused.  CHEST: Chest wall is nontender.  HEART: Regular rate and rhythm without murmurs. No MRG  LUNGS: Clear to auscultation bilaterally. No WRR  ABDOMEN: Soft, positive bowel sounds, nontender, no organomegaly.  RECTAL: Deferred.  SKIN: No rash, no excessive bruising, petechiae, or purpura.  NEUROLOGIC: Cranial nerves II-XII intact without motor/sensory deficit        Result Review    Result Review:  I have personally reviewed the results from the time of this admission to 2023 09:27 EDT and agree with these findings:  [x]  Laboratory  [x]  Microbiology  []  Radiology  []  EKG/Telemetry   []  Cardiology/Vascular   []  Pathology  []  Old records  []  Other:  Most notable findings include:           Assessment & Plan      Brief Patient Summary:  Simran Wall is a 53 y.o. female who       sodium chloride, 10 mL, Intravenous, Q12H             Active Hospital Problems:  Active Hospital Problems    Diagnosis     **UTI (urinary tract infection)     Drug abuse     Chronic mental illness      Plan:   UTI  -UA reviewed, squamous epithelial cells noted, 4+ bacteria  -Patient refuses to answer when asked about possible UTI symptoms including dysuria, frequency, urgency and abdominal pain  -1 Dose of antibiotics administered in ED     Chronic Mental Illness  Drug Abuse  -CT of the head  reviewed  -Tox screen positive for meth  -Continue Abilify  -Psychiatry consult     Hypothyroidism  -Continue levothyroxine     Essential Hypertension  -Controlled  -Monitor BP  -Continue home Lisinopril       DVT prophylaxis:  Mechanical DVT prophylaxis orders are present.    CODE STATUS:    Code Status (Patient has no pulse and is not breathing): CPR (Attempt to Resuscitate)  Medical Interventions (Patient has pulse or is breathing): Full Support            Electronically signed by Asher Oneli MD, 08/12/23, 09:27 EDT.  [facility] Hospitalist Team

## 2023-08-12 NOTE — ED NOTES
Patient still refuses to speak to nurse. When asked if someone else came in, if she would talk to them about what is going on and patient shook her head no and turned her face away from nurse.

## 2023-08-12 NOTE — ED PROVIDER NOTES
"Subjective   Chief Complaint   Patient presents with    Anxiety       History of Present Illness  Patient is a 53-year-old female presents to the ED via EMS stating she is scared.  On my exam the patient continues report I am scared \"but will not provide any other history.  Asked patient multiple times what brought her to the ED and how we can assist her today, she refuses to provide any other history.      Review of Systems   Unable to perform ROS: Other (see hpi)     No past medical history on file.    Allergies   Allergen Reactions    Cephalexin Unknown (See Comments)    Ibuprofen Unknown (See Comments)       No past surgical history on file.    No family history on file.    Social History     Socioeconomic History    Marital status:    Tobacco Use    Smoking status: Never    Smokeless tobacco: Never   Vaping Use    Vaping Use: Never used   Substance and Sexual Activity    Alcohol use: Never    Drug use: Never     Comment: Pt states no drug use, tox screen positive amphetamines    Sexual activity: Defer           Objective   Physical Exam  Vitals and nursing note reviewed.   Constitutional:       Comments: Appears older than stated age, disheveled   HENT:      Head: Normocephalic and atraumatic.      Mouth/Throat:      Mouth: Mucous membranes are moist.      Pharynx: Oropharynx is clear.   Eyes:      Extraocular Movements: Extraocular movements intact.      Conjunctiva/sclera: Conjunctivae normal.      Pupils: Pupils are equal, round, and reactive to light.   Cardiovascular:      Rate and Rhythm: Normal rate and regular rhythm.      Heart sounds: Normal heart sounds. No murmur heard.    No friction rub. No gallop.   Pulmonary:      Effort: Pulmonary effort is normal.      Breath sounds: Normal breath sounds.   Abdominal:      General: Bowel sounds are normal.      Palpations: Abdomen is soft.   Musculoskeletal:      Cervical back: Normal range of motion and neck supple.   Skin:     General: Skin is warm " "and dry.      Capillary Refill: Capillary refill takes less than 2 seconds.   Neurological:      Mental Status: She is alert.      Comments: Patient moves all extremities, she shakes her head yes and no.  She provides no verbal answers.  Moves all extremities, no apparent weakness.  No apparent facial droop.   Psychiatric:         Speech: She is noncommunicative.       Procedures           ED Course  ED Course as of 08/11/23 2339   Fri Aug 11, 2023   2022 Examined patient with Alexsandra RN at bedside.  Patient refuses to provide any history or answer any questions [LB]   2147 Pt continues to refuse to speak to nursing staff or myself.  [LB]      ED Course User Index  [LB] Nathalie Boyce, APRN      /90   Pulse 88   Temp 98.4 øF (36.9 øC)   Resp 18   Ht 157.5 cm (62\")   Wt 93.9 kg (207 lb 0.2 oz)   SpO2 96%   BMI 37.86 kg/mý   Medications   sodium chloride 0.9 % flush 10 mL (has no administration in time range)   nitrofurantoin (macrocrystal-monohydrate) (MACROBID) capsule 100 mg (has no administration in time range)     CT Head Without Contrast    Result Date: 8/11/2023  Impression: No acute intracranial abnormality. Electronically Signed: Alexys Scott DO  8/11/2023 10:31 PM EDT  Workstation ID: [id number]   Lab Results (last 24 hours)       Procedure Component Value Units Date/Time    CBC & Differential [[id number]]  (Abnormal) Collected: 08/11/23 2040    Specimen: Blood Updated: 08/11/23 2111    Narrative:      The following orders were created for panel order CBC & Differential.  Procedure                               Abnormality         Status                     ---------                               -----------         ------                     CBC Auto Differential[[id number]]        Abnormal            Final result               Scan Slide[[id number]]                   Normal              Final result                 Please view results for these tests on the individual orders.    " Ethanol [[id number]] Collected: 08/11/23 2040    Specimen: Blood Updated: 08/11/23 2105     Ethanol % <0.010 %     Narrative:      Plasma Ethanol Clinical Symptoms:    ETOH (%)               Clinical Symptom  .01-.05              No apparent influence  .03-.12              Euphoria, Diminished judgment and attention   .09-.25              Impaired comprehension, Muscle incoordination  .18-.30              Confusion, Staggered gait, Slurred speech  .25-.40              Markedly decreased response to stimuli, unable to stand or                        walk, vomitting, sleep or stupor  .35-.50              Comatose, Anesthesia, Subnormal body temperature        Salicylate Level [[id number]]  (Normal) Collected: 08/11/23 2040    Specimen: Blood Updated: 08/11/23 2105     Salicylate <0.3 mg/dL     CBC Auto Differential [[id number]]  (Abnormal) Collected: 08/11/23 2040    Specimen: Blood Updated: 08/11/23 2111     WBC 11.00 10*3/mm3      RBC 4.86 10*6/mm3      Hemoglobin 14.4 g/dL      Hematocrit 42.2 %      MCV 87.0 fL      MCH 29.6 pg      MCHC 34.0 g/dL      RDW 13.7 %      RDW-SD 43.8 fl      MPV 9.0 fL      Platelets 283 10*3/mm3      Neutrophil % 54.0 %      Lymphocyte % 35.0 %      Monocyte % 7.8 %      Eosinophil % 1.6 %      Basophil % 1.6 %      Neutrophils, Absolute 5.90 10*3/mm3      Lymphocytes, Absolute 3.80 10*3/mm3      Monocytes, Absolute 0.90 10*3/mm3      Eosinophils, Absolute 0.20 10*3/mm3      Basophils, Absolute 0.20 10*3/mm3      nRBC 0.2 /100 WBC     Scan Slide [[id number]]  (Normal) Collected: 08/11/23 2040    Specimen: Blood Updated: 08/11/23 2111     RBC Morphology Normal     WBC Morphology Normal     Platelet Morphology Normal    Urinalysis With Microscopic If Indicated (No Culture) - Urine, Clean Catch [[id number]]  (Abnormal) Collected: 08/11/23 2049    Specimen: Urine, Clean Catch Updated: 08/11/23 2057     Color, UA Dark Yellow     Comment: Result checked          Appearance, UA Cloudy      Comment: Result checked          pH, UA 5.5     Specific Gravity, UA 1.029     Glucose,  mg/dL (Trace)     Ketones, UA 15 mg/dL (1+)     Bilirubin, UA Small (1+)     Comment: Confirmation testing is unavailable.  A serum bilirubin is recommended for further assessment.        Blood, UA Negative     Protein, UA 30 mg/dL (1+)     Leuk Esterase, UA Trace     Nitrite, UA Positive     Urobilinogen, UA 1.0 E.U./dL    Urine Drug Screen - Urine, Clean Catch [[id number]]  (Abnormal) Collected: 08/11/23 2049    Specimen: Urine, Clean Catch Updated: 08/11/23 2119     Amphet/Methamphet, Screen Positive     Barbiturates Screen, Urine Negative     Benzodiazepine Screen, Urine Negative     Cocaine Screen, Urine Negative     Opiate Screen Negative     THC, Screen, Urine Negative     Methadone Screen, Urine Negative     Oxycodone Screen, Urine Negative    Narrative:      Negative Thresholds Per Drugs Screened:    Amphetamines                 500 ng/ml  Barbiturates                 200 ng/ml  Benzodiazepines              100 ng/ml  Cocaine                      300 ng/ml  Methadone                    300 ng/ml  Opiates                      300 ng/ml  Oxycodone                    100 ng/ml  THC                           50 ng/ml    The Normal Value for all drugs tested is negative. This report includes final unconfirmed screening results to be used for medical treatment purposes only. Unconfirmed results must not be used for non-medical purposes such as employment or legal testing. Clinical consideration should be applied to any drug of abuse test, particularly when unconfirmed results are used.          All urine drugs of abuse requests without chain of custody are for medical screening purposes only.  False positives are possible.      Urinalysis, Microscopic Only - Urine, Clean Catch [[id number]]  (Abnormal) Collected: 08/11/23 2049    Specimen: Urine, Clean Catch Updated: 08/11/23 2122     RBC, UA 0-2 /HPF      WBC, UA 3-5 /HPF  "     Bacteria, UA 3+ /HPF      Squamous Epithelial Cells, UA 13-20 /HPF      Hyaline Casts, UA 3-6 /LPF      Mucus, UA Trace /HPF      Methodology Manual Light Microscopy    Comprehensive Metabolic Panel [[id number]]  (Abnormal) Collected: 08/11/23 2129    Specimen: Blood Updated: 08/11/23 2154     Glucose 182 mg/dL      BUN 12 mg/dL      Creatinine 0.85 mg/dL      Sodium 140 mmol/L      Potassium 3.4 mmol/L      Chloride 102 mmol/L      CO2 24.0 mmol/L      Calcium 9.1 mg/dL      Total Protein 7.0 g/dL      Albumin 4.2 g/dL      ALT (SGPT) 13 U/L      AST (SGOT) 17 U/L      Alkaline Phosphatase 76 U/L      Total Bilirubin 0.4 mg/dL      Globulin 2.8 gm/dL      A/G Ratio 1.5 g/dL      BUN/Creatinine Ratio 14.1     Anion Gap 14.0 mmol/L      eGFR 81.0 mL/min/1.73     Narrative:      GFR Normal >60  Chronic Kidney Disease <60  Kidney Failure <15                                                 Medical Decision Making  Patient is a 53-year-old male presents the ED via EMS stating she was \"scared\".  On multiple attempts to speak with the patient bedside, patient refuses to provide any verbal responses, her eyes open, she tracks across the room, she moves all of her extremities, however she does not answer any questions.  This was attempted multiple times per myself and nursing staff.  It is unknown if the patient is altered upon her mental status, however she does follow commands.  Patient's work-up here in the ED concerning for UTI, she is positive for amphetamine use.  CT head reveals no acute findings.  She was started on Macrobid, will be admitted to hospitalist service for further evaluation.  I spoke with CRISTIANA Granger.  I discussed this with the patient, she again had no response, however open her eyes and will turn her head away from me during conversation    Problems Addressed:  Altered mental status, unspecified altered mental status type: complicated acute illness or injury  Amphetamine use: complicated acute " illness or injury    Amount and/or Complexity of Data Reviewed  Labs: ordered.  Radiology: ordered.    Risk  Prescription drug management.  Decision regarding hospitalization.        Final diagnoses:   Amphetamine use   Altered mental status, unspecified altered mental status type       ED Disposition  ED Disposition       ED Disposition   Decision to Admit    Condition   --    Comment   Level of Care: Telemetry [5]   Admitting Physician: GAB KAUFFMAN [[id number]]   Attending Physician: GBA KAUFFMAN [[id number]]                 No follow-up provider specified.       Medication List      No changes were made to your prescriptions during this visit.            Nathalie Boyce, APRN  08/11/23 6130

## 2023-08-12 NOTE — ED NOTES
Patient keeps moving and tensing up when attempting IV and IV is not threading in even with blood return noted. Attempted x 2

## 2023-08-12 NOTE — ED NOTES
Patient is alert and holding her blanket and purse. Refuses to talk to nurse. When asked questions in a calm, quiet tone, patient looks at nurse then shakes head no and turns her head.

## 2023-08-12 NOTE — CONSULTS
"  Referring Provider:     Bárbara Silver APRN     Reason for Consultation: Refusal to speak     Chief complaint : Irritability, anxiety    Subjective .     History of present illness:  Simran Wall is a 53 y.o. female with a past medical history of mental illness, drug use (Meth), HTN, and Hypothyroidism who presented to [facility] on 8/11/2023 stating to the ED triage \"I am scared\".  Pt refused to speak to staff, when staff attempted to speak with the pt, she would close her eyes and turn away from the person talking.     Psychiatry consulted due to pt refusal to speak.  On chart review, patient was discharged from [facility] on 8/2/23 after being treated for meth induced delirium. Pt at that time exhibiting bizarre behavior, refused CD trt and was cleared for d/c.    Today pt is sitting in ED room eating breakfast. Pt knew she was being treated for a UTI and reported feeling anxious.  Pt hesitant to answer interview questions and guarded.   Pt appears restless tapping foot during interview, poor eye contact.  Pt denied SI/HI/AVH  Pt denied symptoms of psychosis, gerardo, hypomania.  Pt denied feeling sad, or hopeless.  Pt denied substance use, though tox screen positive for methamphetamine.  Pt has poor insight into health, and non-compliant with care.  Pt reports she is homeless at this time but has family she can stay with if she needs to.   Pt endorses symptoms of anxiety, pt reports feeling nervous, she wants to leave hospital.    Interview limited due to pt cooperation.  Pt did report that she felt safe today.  No family at bedside.        Review of Systems   Review of systems could not be obtained due to   pt cooperation     The following portions of the patient's history were reviewed and updated as appropriate: allergies, current medications, past family history, past medical history, past social history, past surgical history and problem list.    History    Past psychiatric history " "    No past medical history on file.     No family history on file.     Social History     Tobacco Use    Smoking status: Never    Smokeless tobacco: Never   Vaping Use    Vaping Use: Never used   Substance Use Topics    Alcohol use: Never    Drug use: Never     Comment: Pt states no drug use, tox screen positive amphetamines        (Not in a hospital admission)       Scheduled Meds:  sodium chloride, 10 mL, Intravenous, Q12H         Continuous Infusions:       PRN Meds:    acetaminophen **OR** acetaminophen **OR** acetaminophen    senna-docusate sodium **AND** polyethylene glycol **AND** bisacodyl **AND** bisacodyl    melatonin    ondansetron **OR** ondansetron    [COMPLETED] Insert Peripheral IV **AND** sodium chloride    sodium chloride    sodium chloride      Allergies:  Cephalexin and Ibuprofen      Objective     Vital Signs   /82 (BP Location: Left arm, Patient Position: Lying)   Pulse 77   Temp 98.3 øF (36.8 øC) (Oral)   Resp 18   Ht 157.5 cm (62\")   Wt 93.9 kg (207 lb 0.2 oz)   SpO2 98%   BMI 37.86 kg/mý     Physical Exam:    Muscle strength and tone: moderate, ue's, equal bilaterally  Abnormal Movements: Restless  Gait: VEGA     General Appearance:    Upright eating breakfast        Mental Status Exam:   Hygiene:   good  Cooperation:  Evasive, guarded   Eye Contact:  Poor  Psychomotor Behavior:  Restless  Affect:  Restricted  Mood: irritable  Hopelessness: Denies  Speech:  Minimal  Thought Process:  Goal directed and Linear  Thought Content:  Mood congruent  Suicidal:  None  Homicidal:  None  Hallucinations:  None  Delusion:  None  Memory:  Unable to evaluate  Orientation:  Person, Place, Time, and Situation  Reliability:  poor  Insight:  Poor, evidenced by continued drug use   Judgement:  Impaired  Impulse Control:  Impaired  Physical/Medical Issues:  Yes          Medications and allergies reviewed     Result Review:  I have personally reviewed the results from the time of this admission to " 8/12/2023 14:54 EDT and agree with these findings:  [x]  Laboratory  [x]  Microbiology  []  Radiology  [x]  EKG/Telemetry   []  Cardiology/Vascular   []  Pathology  [x]  Old records  []  Other:      Assessment & Plan       UTI (urinary tract infection)    Chronic mental illness    Drug abuse           Assessment: Methamphetamine abuse Treatment Plan: Psychiatry consulted due to pt refusing to speak to staff, pt is evasive and guarded but appears to be oriented, not psychotic, or displaying any other bizarre behavior.   Pt would benefit from substance use treatment if agreeable based on history and positive uds.  Pt would benefit from community resources for housing and outpatient psychiatric care if agreeable.     Pt chart showed medication history of Abilify 5 mg nightly, ok to restart by admitting team if appropriate, monitor EKG, QTc     Start Buspar 5 mg tid for anxiety     Will follow      Treatment Plan discussed with: Patient        I discussed the patients findings and my recommendations with patient and nursing staff    I have reviewed and approved the behavioral health treatment plans and problem list. Yes  Thank you for the consult   Referring MD has access to consult report and progress notes in EMR     CRISTIANA Dhillon  08/12/23  14:54 EDT

## 2023-08-13 LAB
ANION GAP SERPL CALCULATED.3IONS-SCNC: 13 MMOL/L (ref 5–15)
BUN SERPL-MCNC: 10 MG/DL (ref 6–20)
BUN/CREAT SERPL: 12.5 (ref 7–25)
CALCIUM SPEC-SCNC: 8.9 MG/DL (ref 8.6–10.5)
CHLORIDE SERPL-SCNC: 103 MMOL/L (ref 98–107)
CO2 SERPL-SCNC: 26 MMOL/L (ref 22–29)
CREAT SERPL-MCNC: 0.8 MG/DL (ref 0.57–1)
EGFRCR SERPLBLD CKD-EPI 2021: 87.1 ML/MIN/1.73
GLUCOSE SERPL-MCNC: 181 MG/DL (ref 65–99)
POTASSIUM SERPL-SCNC: 3.5 MMOL/L (ref 3.5–5.2)
SODIUM SERPL-SCNC: 142 MMOL/L (ref 136–145)

## 2023-08-13 PROCEDURE — 99232 SBSQ HOSP IP/OBS MODERATE 35: CPT

## 2023-08-13 PROCEDURE — G0378 HOSPITAL OBSERVATION PER HR: HCPCS

## 2023-08-13 RX ADMIN — ARIPIPRAZOLE 5 MG: 10 TABLET ORAL at 21:08

## 2023-08-13 RX ADMIN — LISINOPRIL 5 MG: 5 TABLET ORAL at 08:44

## 2023-08-13 RX ADMIN — ACETAMINOPHEN 650 MG: 325 TABLET, FILM COATED ORAL at 08:53

## 2023-08-13 RX ADMIN — BUSPIRONE HYDROCHLORIDE 5 MG: 5 TABLET ORAL at 08:44

## 2023-08-13 RX ADMIN — BUSPIRONE HYDROCHLORIDE 5 MG: 5 TABLET ORAL at 21:08

## 2023-08-13 RX ADMIN — BUSPIRONE HYDROCHLORIDE 5 MG: 5 TABLET ORAL at 15:02

## 2023-08-13 NOTE — PLAN OF CARE
Problem: Adult Inpatient Plan of Care  Goal: Plan of Care Review  Outcome: Ongoing, Progressing  Goal: Patient-Specific Goal (Individualized)  Outcome: Ongoing, Progressing  Goal: Absence of Hospital-Acquired Illness or Injury  Outcome: Ongoing, Progressing  Intervention: Identify and Manage Fall Risk  Recent Flowsheet Documentation  Taken 8/13/2023 1609 by Sanaz Corey RN  Safety Promotion/Fall Prevention: safety round/check completed  Taken 8/13/2023 1450 by Sanaz Corey RN  Safety Promotion/Fall Prevention: safety round/check completed  Taken 8/13/2023 1200 by Sanaz Corey RN  Safety Promotion/Fall Prevention: safety round/check completed  Taken 8/13/2023 1037 by Sanaz Corey RN  Safety Promotion/Fall Prevention: safety round/check completed  Taken 8/13/2023 0848 by Sanaz Corey RN  Safety Promotion/Fall Prevention: safety round/check completed  Intervention: Prevent and Manage VTE (Venous Thromboembolism) Risk  Recent Flowsheet Documentation  Taken 8/13/2023 0848 by Sanaz Corey RN  Range of Motion: active ROM (range of motion) encouraged  Intervention: Prevent Infection  Recent Flowsheet Documentation  Taken 8/13/2023 0848 by Sanaz Corey RN  Infection Prevention: single patient room provided  Goal: Optimal Comfort and Wellbeing  Outcome: Ongoing, Progressing  Intervention: Monitor Pain and Promote Comfort  Recent Flowsheet Documentation  Taken 8/13/2023 1200 by Sanaz Corey RN  Pain Management Interventions: (pt states just leave me alone)   medication offered but refused   other (see comments)  Taken 8/13/2023 0848 by Sanaz Corey RN  Pain Management Interventions: see MAR  Intervention: Provide Person-Centered Care  Recent Flowsheet Documentation  Taken 8/13/2023 0848 by Sanaz Corey RN  Trust Relationship/Rapport:   care explained   choices provided   emotional support provided  Goal: Readiness for Transition of Care  Outcome: Ongoing, Progressing     Problem: Behavioral  "Health Comorbidity  Goal: Maintenance of Behavioral Health Symptom Control  Outcome: Ongoing, Progressing     Problem: Diabetes Comorbidity  Goal: Blood Glucose Level Within Targeted Range  Outcome: Ongoing, Progressing     Problem: Hypertension Comorbidity  Goal: Blood Pressure in Desired Range  Outcome: Ongoing, Progressing   Goal Outcome Evaluation:      Pt resting bed states \" just leave me alone and close my door\". Daughter called with update per pt request.  consult called. Pt states she is not safe at home                  "

## 2023-08-13 NOTE — PROGRESS NOTES
"  Referring Provider: Bárbara Silver APRN   Reason for Consultation: Refusal to speak      Chief complaint : Anxiety     Subjective .     History of present illness:  Simran Wall is a 53 y.o. female with a past medical history of mental illness, drug use (Meth), HTN, and Hypothyroidism who presented to [facility] on 8/11/2023 stating to the ED triage \"I am scared\".  Pt refused to speak to staff, when staff attempted to speak with the pt, she would close her eyes and turn away from the person talking.      Psychiatry consulted due to pt refusal to speak.  On chart review, patient was discharged from [facility] on 8/2/23 after being treated for meth induced delirium. Pt at that time exhibiting bizarre behavior, refused CD trt and was cleared for d/c.  Pt knew she was being treated for a UTI and reported feeling anxious.  Pt hesitant to answer interview questions and guarded.   Pt appeared restless tapping foot during interview, poor eye contact.  Pt denied SI/HI/AVH  Pt denied symptoms of psychosis, gerardo, hypomania.  Pt denied feeling sad, or hopeless.  Pt denied substance use, though tox screen positive for methamphetamine.  Pt has poor insight into health, and non-compliant with care.  Pt reports she is homeless at this time but has family she can stay with if she needs to.   Pt endorses symptoms of anxiety, pt reports feeling nervous, she wants to leave hospital.     Interview limited due to pt cooperation.  Pt did report that she felt safe today.  No family at bedside.       Today pt resting in bed, somewhat more cooperative with interview.  Pt reports she feels confused, overwhelmed.   Pt mostly communicated with hand gestures, delayed responses.   Pt knew she is in hospital, knows year and month.  Pt reports she has been having financial disagreements with her brother, whom she is fearful of.  Pt reports having children but does not consent for this provider to speak with them, no " pressing or urgent need to contact children at this time.   Per nursing pt is cooperative with medications   Buspar and Abilify started yesterday.  Pt continues to deny substance use, discussed uds positive for methamphetamine, pt denied using.  Pt lives at home alone, previously told she was homeless.  Reports having some friends/support.   Denies symptoms of depression, psychosis, gerardo, hypomania.  Pt endorses symptoms of anxiety, pt reports feeling nervous, overwhelmed, restless    Denies SI/HI/AVH    Review of Systems   All systems were reviewed and negative except for:  Behavioral/Psych: positive for  anxiety    The following portions of the patient's history were reviewed and updated as appropriate: allergies, current medications, past family history, past medical history, past social history, past surgical history and problem list.    History    Past psychiatric history     Past Medical History:   Diagnosis Date    Diabetes mellitus     Hypertension         History reviewed. No pertinent family history.     Social History     Tobacco Use    Smoking status: Never     Passive exposure: Never    Smokeless tobacco: Never   Vaping Use    Vaping Use: Never used   Substance Use Topics    Alcohol use: Never    Drug use: Yes     Types: Methamphetamines     Comment: Pt states no drug use, tox screen positive amphetamines          Medications Prior to Admission   Medication Sig Dispense Refill Last Dose    ARIPiprazole (ABILIFY) 5 MG tablet Take 1 tablet by mouth Every Night. (Patient not taking: Reported on 8/12/2023) 30 tablet 0 Not Taking    levothyroxine (SYNTHROID, LEVOTHROID) 50 MCG tablet Take 1 tablet by mouth Every Morning. (Patient not taking: Reported on 8/12/2023) 30 tablet 0 Not Taking    lisinopril (PRINIVIL,ZESTRIL) 5 MG tablet Take 1 tablet by mouth Daily. (Patient not taking: Reported on 8/12/2023) 30 tablet 0 Not Taking        Scheduled Meds:  ARIPiprazole, 5 mg, Oral, Nightly  busPIRone, 5 mg, Oral,  "TID  levothyroxine, 50 mcg, Oral, Q AM  lisinopril, 5 mg, Oral, Q24H  sodium chloride, 10 mL, Intravenous, Q12H         Continuous Infusions:       PRN Meds:    acetaminophen **OR** acetaminophen **OR** acetaminophen    senna-docusate sodium **AND** polyethylene glycol **AND** bisacodyl **AND** bisacodyl    melatonin    ondansetron **OR** ondansetron    [COMPLETED] Insert Peripheral IV **AND** sodium chloride    sodium chloride    sodium chloride      Allergies:  Cephalexin and Ibuprofen      Objective     Vital Signs   /56 (BP Location: Right arm, Patient Position: Lying)   Pulse 74   Temp 98.4 øF (36.9 øC) (Oral)   Resp 16   Ht 157.5 cm (62\")   Wt 96.2 kg (212 lb 1.3 oz)   SpO2 96%   BMI 38.79 kg/mý     Physical Exam:    Muscle strength and tone: moderate, ue's, equal bilaterally  Abnormal Movements: None observed   Gait: VEGA     General Appearance:    Resting in bed        Mental Status Exam:   Hygiene:   good  Cooperation:  Guarded  Eye Contact:  Poor  Psychomotor Behavior:  Slow  Affect:  Restricted  Mood: anxious  Hopelessness: Denies  Speech:  Minimal  Thought Process:  Goal directed  Thought Content:  Mood congruent  Suicidal:  None  Homicidal:  None  Hallucinations:  None  Delusion:  None  Memory:  Intact  Orientation:  Person, Place, Time, and Situation  Reliability:  poor  Insight:  Poor  Judgement:  Impaired  Impulse Control:  Impaired        Medications and allergies reviewed     Result Review:  I have personally reviewed the results from the time of this admission to 8/13/2023 15:00 EDT and agree with these findings:  [x]  Laboratory  []  Microbiology  []  Radiology  [x]  EKG/Telemetry   []  Cardiology/Vascular   []  Pathology  []  Old records  []  Other:      Assessment & Plan       UTI (urinary tract infection)    Chronic mental illness    Drug abuse           Assessment: Methamphetamine abuse, anxiety     Treatment Plan:  Psychiatry consulted due to pt refusing to speak to staff, pt is " evasive and guarded but appears to be oriented, not psychotic, or displaying any other bizarre behavior.   Pt would benefit from substance use treatment if agreeable based on history and positive uds.  Pt would benefit from community resources for outpatient psychiatric care if agreeable.      Continue Abilify 5 mg nightly     Continue Buspar 5 mg tid for anxiety      Will follow        Treatment Plan discussed with: Patient      I discussed the patients findings and my recommendations with patient and nursing staff    I have reviewed and approved the behavioral health treatment plans and problem list. Yes  Thank you for the consult   Referring MD has access to consult report and progress notes in EMR     CRISTIANA Dhillon  08/13/23  15:00 EDT

## 2023-08-13 NOTE — PROGRESS NOTES
[facility]     Progress Note    Patient Name: Simran Wall  : 1970  MRN: [id number]  Primary Care Physician:  John Martino MD  Date of admission: 2023  Service date and time: 23 15:38 EDT  Subjective   Subjective     Chief Complaint: anxiety    HPI:  Patient feels better today , less anxious      Objective   Objective     Vitals:   Temp:  [97.7 øF (36.5 øC)-98.4 øF (36.9 øC)] 98.4 øF (36.9 øC)  Heart Rate:  [63-74] 74  Resp:  [16-18] 16  BP: (106-112)/(56-72) 106/56  Physical Exam    Constitutional: Awake, alert in no distress    Eyes: PERRLA, sclerae anicteric, no conjunctival injection   HENT: NCAT, mucous membranes moist   Neck: Supple, no thyromegaly, no lymphadenopathy, trachea midline   Respiratory: Clear to auscultation bilaterally, nonlabored respirations    Cardiovascular: RRR, no murmurs, rubs, or gallops, palpable pedal pulses bilaterally   Gastrointestinal: Positive bowel sounds, soft, nontender, nondistended   Musculoskeletal: No bilateral ankle edema, no clubbing or cyanosis to extremities   Psychiatric: Appropriate affect, cooperative   Neurologic: Oriented x 3, strength symmetric in all extremities, Cranial Nerves grossly intact to confrontation, speech clear   Skin: No rashes     Result Review    Result Review:  I have personally reviewed the results from the time of this admission to 2023 15:38 EDT and agree with these findings:  [x]  Laboratory list / accordion  []  Microbiology  []  Radiology  []  EKG/Telemetry   []  Cardiology/Vascular   []  Pathology  []  Old records  []  Other:  Most notable findings include: urine toxicology positive for amphetamines/methamphetamine      Assessment & Plan   Assessment / Plan       Active Hospital Problems:  Active Hospital Problems    Diagnosis     **UTI (urinary tract infection)     Drug abuse     Chronic mental illness      Plan:   Acute cystitis without hematuria  -UA reviewed, squamous epithelial cells noted, 4+  bacteria  -Patient refuses to answer when asked about possible UTI symptoms including dysuria, frequency, urgency and abdominal pain  Received 1 dose of antibiotics   Urine culture showed no growth     Chronic Mental Illness  Drug Abuse  -CT of the head reviewed  -Tox screen positive for meth  -Continue Abilify  -Psychiatry consulted   -was started on Buspar      Hypothyroidism  -Continue levothyroxine     Essential Hypertension  -Controlled  -Monitor BP  -Continue home Lisinopril    DVT prophylaxis:  Mechanical DVT prophylaxis orders are present.    CODE STATUS:   Code Status (Patient has no pulse and is not breathing): CPR (Attempt to Resuscitate)  Medical Interventions (Patient has pulse or is breathing): Full Support    Disposition:  I expect patient to be discharged in 2 days.    Jeremiah Saldana MD

## 2023-08-13 NOTE — PLAN OF CARE
Goal Outcome Evaluation:              Outcome Evaluation: Pt refused BP and medications this AM.  Refused to give password and did not want family knowing any info.  Pt is stable at this time sleeping.  Being treated for a UTI with oral antibiotics.

## 2023-08-14 LAB
BILIRUB UR QL STRIP: NEGATIVE
CLARITY UR: CLEAR
COLOR UR: YELLOW
GLUCOSE UR STRIP-MCNC: NEGATIVE MG/DL
HGB UR QL STRIP.AUTO: NEGATIVE
KETONES UR QL STRIP: NEGATIVE
LEUKOCYTE ESTERASE UR QL STRIP.AUTO: NEGATIVE
NITRITE UR QL STRIP: NEGATIVE
PH UR STRIP.AUTO: 7 [PH] (ref 5–8)
PROT UR QL STRIP: NEGATIVE
SP GR UR STRIP: 1.02 (ref 1–1.03)
UROBILINOGEN UR QL STRIP: NORMAL

## 2023-08-14 PROCEDURE — G0378 HOSPITAL OBSERVATION PER HR: HCPCS

## 2023-08-14 PROCEDURE — 81003 URINALYSIS AUTO W/O SCOPE: CPT | Performed by: INTERNAL MEDICINE

## 2023-08-14 PROCEDURE — 99232 SBSQ HOSP IP/OBS MODERATE 35: CPT

## 2023-08-14 RX ADMIN — BUSPIRONE HYDROCHLORIDE 5 MG: 5 TABLET ORAL at 16:16

## 2023-08-14 RX ADMIN — ARIPIPRAZOLE 5 MG: 10 TABLET ORAL at 20:00

## 2023-08-14 RX ADMIN — LEVOTHYROXINE SODIUM 50 MCG: 0.05 TABLET ORAL at 05:39

## 2023-08-14 RX ADMIN — BUSPIRONE HYDROCHLORIDE 5 MG: 5 TABLET ORAL at 20:00

## 2023-08-14 NOTE — PLAN OF CARE
Goal Outcome Evaluation:              Outcome Evaluation: Nurse went to pass medications and do morning assessment. Pt. would not respond or ackowledge the nurse. Pt. has talked minimally to other staff members.

## 2023-08-14 NOTE — CASE MANAGEMENT/SOCIAL WORK
Continued Stay Note  JAQUELINE Salmon     Patient Name: Simran Wall  MRN: [id number]  Today's Date: 8/14/2023    Admit Date: 8/11/2023    Plan: Needs CM assessment.   Discharge Plan       Row Name 08/14/23 1554       Plan    Plan Needs CM assessment.    Patient/Family in Agreement with Plan unable to assess    Plan Comments Pt sleeping soundly and would not awake for CM assessment. Will f/u tomorrow 8/15.             Megan Naegele, RN     Office Phone: [phone]  Office Cell: [phone]

## 2023-08-14 NOTE — NURSING NOTE
Nurse went into patients room to administer her morning medications and to do her assessment. Patient would not acknowledge nurse at all. Patient would not take morning medications.

## 2023-08-14 NOTE — PLAN OF CARE
Goal Outcome Evaluation:      Patient sleeping in between care. Patient has no new complaints at this time.

## 2023-08-14 NOTE — PROGRESS NOTES
[facility]     Progress Note    Patient Name: Simran Wall  : 1970  MRN: [id number]  Primary Care Physician:  John Martino MD  Date of admission: 2023  Service date and time: 23 15:40 EDT  Subjective   Subjective     Chief Complaint: anxiety     HPI:  Patient Reports feels better today       Objective   Objective     Vitals:   Temp:  [98.1 øF (36.7 øC)-98.5 øF (36.9 øC)] 98.5 øF (36.9 øC)  Heart Rate:  [67-86] 77  Resp:  [16-24] 24  BP: (131-146)/(60-79) 146/79  Physical Exam    Constitutional: Awake, alert in no distress   Eyes: PERRLA, sclerae anicteric, no conjunctival injection   HENT: NCAT, mucous membranes moist   Neck: Supple, no thyromegaly, no lymphadenopathy, trachea midline   Respiratory: Clear to auscultation bilaterally, nonlabored respirations    Cardiovascular: RRR, no murmurs, rubs, or gallops, palpable pedal pulses bilaterally   Gastrointestinal: Positive bowel sounds, soft, nontender, nondistended   Musculoskeletal: No bilateral ankle edema, no clubbing or cyanosis to extremities   Psychiatric:depressed mood    Neurologic: Oriented x 3, strength symmetric in all extremities, Cranial Nerves grossly intact to confrontation, speech clear   Skin: No rashes     Result Review    Result Review:  I have personally reviewed the results from the time of this admission to 2023 15:40 EDT and agree with these findings:  [x]  Laboratory list / accordion  []  Microbiology  []  Radiology  []  EKG/Telemetry   []  Cardiology/Vascular   []  Pathology  []  Old records  []  Other:  Most notable findings include:       Assessment & Plan   Assessment / Plan       Active Hospital Problems:  Active Hospital Problems    Diagnosis     **UTI (urinary tract infection)     Drug abuse     Chronic mental illness      Plan:    Acute cystitis without hematuria  -UA reviewed, squamous epithelial cells noted, 4+ bacteria  -Patient refuses to answer when asked about possible UTI symptoms including  dysuria, frequency, urgency and abdominal pain  Received 1 dose of antibiotics   Urine culture showed no growth     Chronic Mental Illness  Drug Abuse  -CT of the head reviewed  -Tox screen positive for meth  -Continue Abilify  -Psychiatry consulted   -was started on Buspar      Hypothyroidism  -Continue levothyroxine     Essential Hypertension  -Controlled  -Monitor BP  -Continue home Lisinopril    Awaiting placement     DVT prophylaxis:  Mechanical DVT prophylaxis orders are present.    CODE STATUS:   Code Status (Patient has no pulse and is not breathing): CPR (Attempt to Resuscitate)  Medical Interventions (Patient has pulse or is breathing): Full Support    Disposition:  I expect patient to be discharged in 2 days .    Jeremiah Saldana MD

## 2023-08-14 NOTE — SIGNIFICANT NOTE
08/14/23 1436   Rehab Time/Intention   Session Not Performed patient/family declined evaluation  (c/o back pain,refused to get OOB)   Recommendation   PT - Next Appointment 08/15/23
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
## 2023-08-14 NOTE — PROGRESS NOTES
"  Chief complaint anxiety     Subjective .     History of present illness:  Simran Wall is a 53 y.o. female with a past medical history of mental illness, drug use (Meth), HTN, and Hypothyroidism who presented to [facility] on 8/11/2023 stating to the ED triage \"I am scared\".  Pt refused to speak to staff, when staff attempted to speak with the pt, she would close her eyes and turn away from the person talking.      Psychiatry consulted due to pt refusal to speak.  On chart review, patient was discharged from [facility] on 8/2/23 after being treated for meth induced delirium. Pt at that time exhibiting bizarre behavior, refused CD trt and was cleared for d/c.  Pt knew she was being treated for a UTI and reported feeling anxious.  Pt hesitant to answer interview questions and guarded.   Pt appeared restless tapping foot during interview, poor eye contact.  Pt denied SI/HI/AVH  Pt denied symptoms of psychosis, gerardo, hypomania.  Pt denied feeling sad, or hopeless.  Pt denied substance use, though tox screen positive for methamphetamine.  Pt has poor insight into health, and non-compliant with care.  Pt reports she is homeless at this time but has family she can stay with if she needs to.   Pt endorses symptoms of anxiety, pt reports feeling nervous, she wants to leave hospital.       I saw and assessed the patient today, but interview was limited due to patient's cooperation. She would not speak to me, but just made facial gestures or shook her head. No family present at bedside.     Review of Systems   All systems were reviewed and negative except for:  Behavioral/Psych: positive for  anhedonia and anxiety    The following portions of the patient's history were reviewed and updated as appropriate: allergies, current medications, past family history, past medical history, past social history, past surgical history and problem list.    History    Past psychiatric history\       Medications Prior " "to Admission   Medication Sig Dispense Refill Last Dose    ARIPiprazole (ABILIFY) 5 MG tablet Take 1 tablet by mouth Every Night. (Patient not taking: Reported on 8/12/2023) 30 tablet 0 Not Taking    levothyroxine (SYNTHROID, LEVOTHROID) 50 MCG tablet Take 1 tablet by mouth Every Morning. (Patient not taking: Reported on 8/12/2023) 30 tablet 0 Not Taking    lisinopril (PRINIVIL,ZESTRIL) 5 MG tablet Take 1 tablet by mouth Daily. (Patient not taking: Reported on 8/12/2023) 30 tablet 0 Not Taking        Scheduled Meds:  ARIPiprazole, 5 mg, Oral, Nightly  busPIRone, 5 mg, Oral, TID  levothyroxine, 50 mcg, Oral, Q AM  lisinopril, 5 mg, Oral, Q24H  sodium chloride, 10 mL, Intravenous, Q12H         Continuous Infusions:       PRN Meds:    acetaminophen **OR** acetaminophen **OR** acetaminophen    senna-docusate sodium **AND** polyethylene glycol **AND** bisacodyl **AND** bisacodyl    melatonin    ondansetron **OR** ondansetron    [COMPLETED] Insert Peripheral IV **AND** sodium chloride    sodium chloride    sodium chloride      Allergies:  Cephalexin and Ibuprofen      Objective     Vital Signs   /60 (BP Location: Left arm, Patient Position: Lying)   Pulse 67   Temp 98.1 øF (36.7 øC) (Oral)   Resp 16   Ht 157.5 cm (62\")   Wt 96.2 kg (212 lb 1.3 oz)   SpO2 96%   BMI 38.79 kg/mý     Physical Exam:    Musculoskeletal:   Muscle strength and tone: moderate, equal bilaterally.   Abnormal Movements: None noted.   Gait: VEGA, patient in bed.      General Appearance:    Resting in bed, in NAD.      Mental Status Exam:   Hygiene:   good  Cooperation:  Guarded  Eye Contact:  Poor  Psychomotor Behavior:  Slow  Affect:  Restricted  Mood: depressed  Hopelessness: Denies  Speech:   Patient refused to talk  Thought Process:  Unable to demonstrate  Thought Content:  Unable to demonstrate  Suicidal:   VEGA, patient uncooperative with interview  Homicidal:   VEGA, patient uncooperative with interview  Hallucinations:  Not " demonstrated today  Delusion:  Unable to demonstrate  Memory:  Unable to evaluate  Orientation:  Unable to evaluate  Reliability:  poor  Insight:  Poor  Judgement:  Poor  Impulse Control:  Impaired       Medications and allergies reviewed.    Result Review:  I have personally reviewed the results from the time of this admission to 8/14/2023 07:58 EDT and agree with these findings:  [x]  Laboratory  []  Microbiology  []  Radiology  [x]  EKG/Telemetry   []  Cardiology/Vascular   []  Pathology  []  Old records  []  Other:      Assessment & Plan       UTI (urinary tract infection)    Chronic mental illness    Drug abuse       Assessment: methamphetamine abuse, anxiety   Treatment Plan: Psychiatry consulted due to pt refusing to speak to staff. On today's assessment, patient still unwilling to speak with me for interview. She would shake her head no, or make a facial gesture.     Previous provider started abilify and buspirone. Those medications can be continued.     Patient would benefit from substance use treatment if agreeable based on history and positive uds.    Pt would benefit from community resources for outpatient psychiatric care if agreeable.      Continue Abilify 5 mg nightly     Continue Buspar 5 mg tid for anxiety      Will continue to follow.    Treatment Plan discussed with: Patient    I discussed the patients findings and my recommendations with     I have reviewed and approved the behavioral health treatment plans and problem list. Yes     Referring MD has access to consult report and progress notes in EMR     CRISTIANA Kaiser  08/14/23  07:58 EDT

## 2023-08-15 ENCOUNTER — READMISSION MANAGEMENT (OUTPATIENT)
Dept: CALL CENTER | Facility: HOSPITAL | Age: 53
End: 2023-08-15
Payer: MEDICARE

## 2023-08-15 VITALS
TEMPERATURE: 98.3 F | OXYGEN SATURATION: 96 % | HEIGHT: 62 IN | DIASTOLIC BLOOD PRESSURE: 79 MMHG | RESPIRATION RATE: 17 BRPM | SYSTOLIC BLOOD PRESSURE: 128 MMHG | WEIGHT: 212.08 LBS | HEART RATE: 78 BPM | BODY MASS INDEX: 39.03 KG/M2

## 2023-08-15 PROCEDURE — G0378 HOSPITAL OBSERVATION PER HR: HCPCS

## 2023-08-15 PROCEDURE — 99231 SBSQ HOSP IP/OBS SF/LOW 25: CPT

## 2023-08-15 PROCEDURE — 97162 PT EVAL MOD COMPLEX 30 MIN: CPT

## 2023-08-15 RX ORDER — ENOXAPARIN SODIUM 100 MG/ML
40 INJECTION SUBCUTANEOUS EVERY 24 HOURS
Status: DISCONTINUED | OUTPATIENT
Start: 2023-08-15 | End: 2023-08-15 | Stop reason: HOSPADM

## 2023-08-15 RX ORDER — BUSPIRONE HYDROCHLORIDE 5 MG/1
5 TABLET ORAL 3 TIMES DAILY
Qty: 90 TABLET | Refills: 0 | Status: SHIPPED | OUTPATIENT
Start: 2023-08-15

## 2023-08-15 RX ADMIN — BUSPIRONE HYDROCHLORIDE 5 MG: 5 TABLET ORAL at 09:09

## 2023-08-15 RX ADMIN — LEVOTHYROXINE SODIUM 50 MCG: 0.05 TABLET ORAL at 05:33

## 2023-08-15 RX ADMIN — LISINOPRIL 5 MG: 5 TABLET ORAL at 09:09

## 2023-08-15 NOTE — DISCHARGE INSTR - OTHER ORDERS
[place] [employer] Transportation: [facility] Transportation: [phone], Monday through Friday, 8a.m.-8p.m.  [employer] Transportation: [employer]: [phone].

## 2023-08-15 NOTE — PLAN OF CARE
Problem: Adult Inpatient Plan of Care  Goal: Plan of Care Review  Outcome: Adequate for Care Transition  Flowsheets (Taken 8/15/2023 1427)  Progress: improving  Plan of Care Reviewed With: patient  Goal: Patient-Specific Goal (Individualized)  Outcome: Adequate for Care Transition  Goal: Absence of Hospital-Acquired Illness or Injury  Outcome: Adequate for Care Transition  Intervention: Identify and Manage Fall Risk  Recent Flowsheet Documentation  Taken 8/15/2023 1205 by Gaby Luke RN  Safety Promotion/Fall Prevention:   assistive device/personal items within reach   clutter free environment maintained   fall prevention program maintained   nonskid shoes/slippers when out of bed   room organization consistent   safety round/check completed  Taken 8/15/2023 1039 by Gaby Luke RN  Safety Promotion/Fall Prevention:   assistive device/personal items within reach   clutter free environment maintained   nonskid shoes/slippers when out of bed   room organization consistent   safety round/check completed   fall prevention program maintained  Taken 8/15/2023 0845 by Gaby Luke RN  Safety Promotion/Fall Prevention:   assistive device/personal items within reach   clutter free environment maintained   fall prevention program maintained   nonskid shoes/slippers when out of bed   room organization consistent   safety round/check completed  Intervention: Prevent Skin Injury  Recent Flowsheet Documentation  Taken 8/15/2023 0845 by Gaby Luke RN  Body Position:   side-lying   head facing, right  Intervention: Prevent and Manage VTE (Venous Thromboembolism) Risk  Recent Flowsheet Documentation  Taken 8/15/2023 1205 by Gaby Luke RN  Activity Management: ambulated in room  Taken 8/15/2023 0845 by Gaby Luke RN  Activity Management: activity encouraged  VTE Prevention/Management: sequential compression devices off  Range of Motion: active ROM (range of motion) encouraged  Intervention: Prevent  Infection  Recent Flowsheet Documentation  Taken 8/15/2023 1205 by Gaby Luke RN  Infection Prevention:   environmental surveillance performed   hand hygiene promoted   single patient room provided  Taken 8/15/2023 1039 by Gaby Luke RN  Infection Prevention:   environmental surveillance performed   hand hygiene promoted   rest/sleep promoted   single patient room provided  Taken 8/15/2023 0845 by Gaby Luke RN  Infection Prevention:   environmental surveillance performed   hand hygiene promoted   single patient room provided  Goal: Optimal Comfort and Wellbeing  Outcome: Adequate for Care Transition  Intervention: Monitor Pain and Promote Comfort  Recent Flowsheet Documentation  Taken 8/15/2023 0845 by Gaby Luke RN  Pain Management Interventions:   care clustered   diversional activity provided   breathing exercises  Intervention: Provide Person-Centered Care  Recent Flowsheet Documentation  Taken 8/15/2023 0845 by Gaby Luke RN  Trust Relationship/Rapport:   care explained   choices provided  Goal: Readiness for Transition of Care  Outcome: Adequate for Care Transition     Problem: Behavioral Health Comorbidity  Goal: Maintenance of Behavioral Health Symptom Control  Outcome: Adequate for Care Transition  Intervention: Maintain Behavioral Health Symptom Control  Recent Flowsheet Documentation  Taken 8/15/2023 1205 by Gaby Luke RN  Medication Review/Management:   medications reviewed   high-risk medications identified  Taken 8/15/2023 0845 by Gaby Luke RN  Medication Review/Management:   medications reviewed   high-risk medications identified     Problem: Diabetes Comorbidity  Goal: Blood Glucose Level Within Targeted Range  Outcome: Adequate for Care Transition  Intervention: Monitor and Manage Glycemia  Recent Flowsheet Documentation  Taken 8/15/2023 0845 by Gaby Luke RN  Glycemic Management: blood glucose monitored     Problem: Hypertension Comorbidity  Goal: Blood  Pressure in Desired Range  Outcome: Adequate for Care Transition  Intervention: Maintain Blood Pressure Management  Recent Flowsheet Documentation  Taken 8/15/2023 1205 by Gaby Luke RN  Medication Review/Management:   medications reviewed   high-risk medications identified  Taken 8/15/2023 0845 by Gaby Luke RN  Medication Review/Management:   medications reviewed   high-risk medications identified     Problem: Skin Injury Risk Increased  Goal: Skin Health and Integrity  Outcome: Adequate for Care Transition  Intervention: Optimize Skin Protection  Recent Flowsheet Documentation  Taken 8/15/2023 0845 by Gaby Luke RN  Head of Bed (HOB) Positioning: HOB at 30-45 degrees     Problem: Fall Injury Risk  Goal: Absence of Fall and Fall-Related Injury  Outcome: Adequate for Care Transition  Intervention: Identify and Manage Contributors  Recent Flowsheet Documentation  Taken 8/15/2023 1205 by Gaby Luke RN  Medication Review/Management:   medications reviewed   high-risk medications identified  Taken 8/15/2023 0845 by Gaby Luke RN  Medication Review/Management:   medications reviewed   high-risk medications identified  Intervention: Promote Injury-Free Environment  Recent Flowsheet Documentation  Taken 8/15/2023 1205 by Gaby Luke RN  Safety Promotion/Fall Prevention:   assistive device/personal items within reach   clutter free environment maintained   fall prevention program maintained   nonskid shoes/slippers when out of bed   room organization consistent   safety round/check completed  Taken 8/15/2023 1039 by Gaby Luke RN  Safety Promotion/Fall Prevention:   assistive device/personal items within reach   clutter free environment maintained   nonskid shoes/slippers when out of bed   room organization consistent   safety round/check completed   fall prevention program maintained  Taken 8/15/2023 0845 by Gaby Luke RN  Safety Promotion/Fall Prevention:   assistive device/personal  items within reach   clutter free environment maintained   fall prevention program maintained   nonskid shoes/slippers when out of bed   room organization consistent   safety round/check completed   Goal Outcome Evaluation:  Plan of Care Reviewed With: patient        Progress: improving     Alert and oriented x 4. Able to verbalize needs and wants. Takes medication whole and tolerates well. Independent for transfers/ambulation. Does not require O2 therapy at this time. Declined going to SNF, will discharge to home. Transportation to be set up through insurance company. No s/s of hyper/hypoglycemia noted. Currently in bed, awake. Dressed and ready to discharge.

## 2023-08-15 NOTE — DISCHARGE PLACEMENT REQUEST
"Simran Wall (53 y.o. Female)       Date of Birth   1970    Social Security Number       Address   [address] [place] [address] [place] [address]    Home Phone   [phone]    MRN   [id number]       Jain   None    Marital Status                               Admission Date   7/31/23    Admission Type   Emergency    Admitting Provider       Attending Provider       Department, Room/Bed   [facility] 2A PEDIATRICS, 205/1       Discharge Date   8/2/2023    Discharge Disposition   Home or Self Care    Discharge Destination   Home                              Attending Provider: (none)   Allergies: Cephalexin, Ibuprofen    Isolation: None   Infection: None   Code Status: CPR    Ht: 157.5 cm (62\")   Wt: 97.8 kg (215 lb 9.8 oz)    Admission Cmt: None   Principal Problem: Mental status alteration [R41.82]                   Active Insurance as of 7/31/2023       Primary Coverage       Payor Plan Insurance Group Employer/Plan Group    HUMANA MEDICARE REPLACEMENT HUMANA MEDICARE REPLACEMENT [id number]       Payor Plan Address Payor Plan Phone Number Payor Plan Fax Number Effective Dates    [address] [address] [phone]  3/1/2023 - None Entered    [facility] [id number]         Subscriber Name Subscriber Birth Date Member ID       SIMRAN WALL 1970 [id number]               Secondary Coverage       Payor Plan Insurance Group Employer/Plan Group    [place]        Payor Plan Address Payor Plan Phone Number Payor Plan Fax Number Effective Dates    [address]   5/24/2021 - None Entered    [place] [address]         Subscriber Name Subscriber Birth Date Member ID       SIMRAN WALL 1970 [id number]                     Emergency Contacts        (Rel.) Home Phone Work Phone Mobile Phone    WOODROW WALL (Daughter) -- -- [phone]    CLARITZA NUNEZ (Son) -- -- [phone]                 Discharge Summaryl        Roque Galarza MD at 08/02/23 1419        " "    Enter Query Response Below      Query Response: Methamphetamine abuse, POA             If applicable, please update the problem list.   Patient: Simran Wall        : 1970  Account: [id number]           Admit Date: 2023        How to Respond to this query:       a. Click New Note     b. Answer query within the yellow box.                c. Update the Problem List, if applicable.    Dr. Galarza,    Patient presents with confusion. Per the H&P \"Patient was seen earlier today in ER for altered mental status, paranoia thought to be secondary to methamphetamine use but was discharged. Substance abuse-methamphetamines, possible underlying depression.\" The patient refused Abilify and Geodon. Psychiatry Consulted on , diagnosed patient with \"methamphetamine dependence, substance induced mood d/o.\" The patient was treated with NaCL fluids and Psychiatry Consult. Discharge Summary with \"Delirium due to substance abuse-methamphetamine.\"    Please clarify conflicting documentation as:  -Methamphetamine Abuse  -Methamphetamine Dependence  -Other- specify__________  -Unable to determine    By submitting this query, we are merely seeking further clarification of documentation to accurately reflect all conditions that you are monitoring, evaluating, treating or that extend the hospitalization or utilize additional resources of care. Please utilize your independent clinical judgment when addressing the question(s) above.     This query and your response, once completed, will be entered into the legal medical record.    Sincerely,  Lola Olivarez RN, BSN  [email or web address]  Clinical Documentation Integrity Program     Electronically signed by Roque Galarza MD at 23 1306       Jimena Corral at 23 1419          Case Management Discharge Note      Final Note: Routine home                 Transportation Services  Taxi: other ([employer])    Final Discharge Disposition Code: 01 - home or " "self-care    Electronically signed by Jimena Corral at 08/02/23 1442       Chikis Ny MD at 08/02/23 1419            Chief complaint \"I dont like to talk\"     Subjective .     History of present illness:  The patient is a 53 y.o. female who was admitted secondary to mental status changes. Pmhx: asthma, depression, meth dependence. Psych consult was requested by Dr Mitch schultz to hallucinations, paranoia, substance dependence.  The pt was limited historian, she was very guarded, provided limited answers, was getting agitated when more questions asked. The pt acknowledged long hx of depression, was on prozac and sertraline in the past, reported no improvement on meds, however, she had sporadic compliance with meds  The pt also has very limited recollection about events leading to the admission, she acknowledged multiple stressors, but was not willing to elaborate on details, the pt has very poor social support. The pt lives alone, very isolative her herself, watching TV and using meth on the daily basis.  The pt was not wiling to elaborate on details, stated \"too many question\" and \" I dont like to talk to people\"   Per records,  the pt was seen in the ER on the day of admission, was d/c completely clothed but was found   in the surgical department with only a towel wrapped around her otherwise completely naked.  When asked why the patient was in the surgical department she states \"I do not know\".  When asked what she was doing she states \"I did not do what you accused me of doing\".   Past psych hx:depression, meth dependence     Today the pt was alert and awake, still very limited interaction with staff, focused on d/c, will be d/c via cab service , the pt feels comfortable with d/c, was not willing to accept CD tx, has very poor insight about meth use. The pt denied AVH/Si/Hi  Earlier today the pt was walking in the hallway wearing hospital gown but she was completely aware of what she was doing    " "      Review of Systems   Pertinent items are noted in HPI, all other systems reviewed and negative    History          No medications prior to admission.        Scheduled Meds:  ARIPiprazole, 5 mg, Oral, Nightly  insulin lispro, 2-9 Units, Subcutaneous, 4x Daily AC & at Bedtime  levothyroxine, 50 mcg, Oral, Q AM  lisinopril, 5 mg, Oral, Q24H  senna-docusate sodium, 2 tablet, Oral, BID  sodium chloride, 10 mL, Intravenous, Q12H         Continuous Infusions:       PRN Meds:    senna-docusate sodium **AND** polyethylene glycol **AND** bisacodyl **AND** bisacodyl    Calcium Replacement - Follow Nurse / BPA Driven Protocol    dextrose    dextrose    glucagon (human recombinant)    HYDROcodone-acetaminophen    Magnesium Standard Dose Replacement - Follow Nurse / BPA Driven Protocol    nitroglycerin    Phosphorus Replacement - Follow Nurse / BPA Driven Protocol    Potassium Replacement - Follow Nurse / BPA Driven Protocol    risperiDONE    [COMPLETED] Insert Peripheral IV **AND** sodium chloride    sodium chloride    sodium chloride      Allergies:  Cephalexin and Ibuprofen      Objective     Vital Signs   /65   Pulse 77   Temp 98.4 øF (36.9 øC)   Resp 18   Ht 157.5 cm (62\")   Wt 97.8 kg (215 lb 9.8 oz)   SpO2 95%   BMI 39.44 kg/mý     Physical Exam:     General Appearance:    In NAD         Mental Status Exam:    Hygiene:   fair  Cooperation:   limited   Eye Contact:  Fair  Psychomotor Behavior:  Appropriate  Affect:  Restricted  Hopelessness: Denies  Speech:  Normal  Thought Progress:  Goal directed and Linear  Thought Content:  Mood congruent  Suicidal:  None  Homicidal:  None  Hallucinations:  None  Delusion:  None  Memory:   fair   Orientation:  Person, Place, Time, and Situation  Reliability:  fair  Insight:  Poor  Judgement:  Impaired  Impulse Control:  Poor  Physical/Medical Issues:  Yes      Medications and allergies reviewed      Lab Results   Component Value Date    GLUCOSE 221 (H) 08/01/2023    " CALCIUM 8.6 08/01/2023     08/01/2023    K 4.1 08/01/2023    CO2 25.0 08/01/2023     08/01/2023    BUN 13 08/01/2023    CREATININE 0.76 08/01/2023    EGFRIFNONA 130 05/24/2021    BCR 17.1 08/01/2023    ANIONGAP 11.0 08/01/2023       Last Urine Toxicity  More data may exist         Latest Ref Rng & Units 7/31/2023 7/15/2018   LAST URINE TOXICITY RESULTS   Creatinine, Urine mg/dL - 57.8Urine creatinines <20 mg/dL may express a dilution effect which can cause false negatives for the drug screen.    Amphetamine, Urine Qual NEGATIVE - NEGATIVE    Barbiturates Screen, Urine Negative Negative  NEGATIVE    Benzodiazepine Screen, Urine Negative Negative  NEGATIVE    Cocaine Screen, Urine Negative Negative  NEGATIVE    Methadone Screen , Urine Negative Negative  NEGATIVE        No results found for: PHENYTOIN, PHENOBARB, VALPROATE, CBMZ    Lab Results   Component Value Date     08/01/2023    BUN 13 08/01/2023    CREATININE 0.76 08/01/2023    TSH 30.320 (H) 07/31/2023    WBC 11.00 (H) 07/31/2023       Brief Urine Lab Results  (Last result in the past 365 days)        Color   Clarity   Blood   Leuk Est   Nitrite   Protein   CREAT   Urine HCG        07/31/23 0542 Alexsandra   Clear   Negative   Trace   Negative   Trace                   Assessment & Plan       Mental status alteration    Type 2 diabetes mellitus    Hypothyroidism    Hypertension    Delirium          Assessment:  methamphetamine dependence , substance induced mood d/o    Treatment Plan:    the pt presented with the sxs of chemical dependence, mood d/o  The pt was confused and displayed psychotic sxs (when intoxicated with meth)   The pt was started on risperidone  Cont ability 5 mg po QHS (better tolerated)   The pt will benefit from psych f/u, CD treatment, but the pt has no desire to get help and stop using meth   The pt can be d/c when medically stable    Treatment Plan discussed with: Patient and nursing     I discussed the patients findings and  my recommendations with patient and nursing staff    I have reviewed and approved the behavioral health treatment plans and problem list. Yes     Referring MD has access to consult report and progress notes in EMR     Chikis Ny MD  08/02/23  14:34 EDT              Electronically signed by Chikis Ny MD at 08/02/23 1444       Brandee Bassett, RN at 08/02/23 1346          Discharge via cab service, discharge instructions given, education provided.    Electronically signed by Brandee Bassett RN at 08/02/23 1347       Brandee Bassett RN at 08/02/23 1345          No family to reach of discharge. Patient states there is no one to contact, left messages on phone numbers that were listed in computer.no answer    Electronically signed by Brandee Bassett RN at 08/02/23 1346       Brandee Bassett RN at 08/02/23 1344          Belongings sent with patient upon discharging, clothes, purse, wallet    Electronically signed by Brandee Bassett RN at 08/02/23 1344       Brandee Bassett RN at 08/02/23 1343            Problem: Adult Inpatient Plan of Care  Goal: Plan of Care Review  Outcome: Met  Flowsheets (Taken 8/2/2023 1343)  Plan of Care Reviewed With: patient  Outcome Evaluation: patient discharging home via cab service.  Goal: Patient-Specific Goal (Individualized)  Outcome: Met  Goal: Absence of Hospital-Acquired Illness or Injury  Outcome: Met  Goal: Optimal Comfort and Wellbeing  Outcome: Met  Goal: Readiness for Transition of Care  Outcome: Met   Goal Outcome Evaluation:  Plan of Care Reviewed With: patient           Outcome Evaluation: patient discharging home via cab service.           Electronically signed by Brandee Bassett RN at 08/02/23 1343       Jimena Valadez RN at 08/02/23 1320          Diabetes Education  Assessment/Teaching    Patient Name:  Simran Wall  YOB: 1970  MRN: [id number]  Admit Date:  7/31/2023      Assessment Date:  8/2/2023  Flowsheet Row Most Recent Value   General  "Information     Referral From: A1c, Blood glucose  [On 8/1/2023 A1c was 9.6% and admission blood sugar was 239.]   Height 157.5 cm (62\")   Height Method Actual   Weight --  [PT refused]   Weight Method Bed scale   Pregnancy Assessment    Diabetes History    What type of diabetes do you have? Type 2   Length of Diabetes Diagnosis 10 + years  [Patient stated that she was diagnosed in 2011]   Current DM knowledge fair   Do you test your blood sugar at home? no   Have you had high blood sugar? (>140mg/dl) yes   How often do you have high blood sugar? unknown   When was your last high blood sugar? Admission blood sugar 239   Education Preferences    What areas of diabetes would you like to learn about? avoiding high blood sugar, diabetes complications, testing my blood sugar at home, medications for diabetes   Nutrition Information    Assessment Topics    Taking Medication - Assessment Needs education   Problem Solving - Assessment Needs education   Reducing Risk - Assessment Needs education   Monitoring - Assessment Needs education   DM Goals    Taking Medication - Goal Today   Problem Solving - Goal Today   Reducing Risk - Goal Today   Monitoring - Goal Today            Flowsheet Row Most Recent Value   DM Education Needs    Meter Has own   Meter Type Other (comment)  [Patient stated she has a new glucometer but unsure of the name.]   Frequency of Testing Daily  [Discussed with patient that it is recommended to check blood sugar daily varying the times before meals and at bedtime.]   Medication Oral  [Patient stated she has Metfromin at home but hasn't been taking. Discussed with patient the importance of taking meds as prescribed.]   Problem Solving Hyperglycemia, Signs, Symptoms, Treatment   Reducing Risks A1C testing  [On 8/1/2023 A1c was 9.6%.]   Discharge Plan Home   Motivation Moderate   Teaching Method Discussion, Handouts   Patient Response Verbalized understanding              Other Comments:  A1c info sheet " given with discussion on A1c target and healthy blood sugar range. Patient stated she had her gallbladder taken out and they told her she might always have diarrhea so she wouldn't know if the Metformin was causing diarrhea. Patient stated she drinks regular Mountain Dew and doesn't want to do diet drinks. Asked patient if she could alternate with water when drinking Mountain Dew and she said she could alternate. Discussed with patient that drinking water and exercise are 2 natural ways to help in the control of blood sugar. Patient has no further questions or concerns related to diabetes at this time.        Electronically signed by:  Jimena Valadez RN  08/02/23 14:23 EDT    Electronically signed by Jimena Valadez RN at 08/02/23 1426       Payal James at 08/02/23 1306          Social Work Assessment  JAQUELINE Salmon     Patient Name: Simran Wall  MRN: [id number]  Today's Date: 8/2/2023    Admit Date: 7/31/2023     Discharge Plan       Row Name 08/02/23 1305       Plan    Plan Comments SW called Medicaid transport and scheduled ride home.  Trip ID [id number].  [id number].  They are to call nurse's station when they are on the way.      Row Name 08/02/23 1021       Plan    Plan Return home. Refused interview    Plan Comments Discharge orders for today. Refused to talk to CM.             Payal James LCSW    Office: [phone]  Fax: [phone]  [email or web address]     Electronically signed by Payal James at 08/02/23 1307       Jimena Corral at 08/02/23 1022          Continued Stay Note  JAQUELINE Salmon     Patient Name: Simran Wall  MRN: [id number]  Today's Date: 8/2/2023    Admit Date: 7/31/2023    Plan: Return home. Refused interview   Discharge Plan       Row Name 08/02/23 1021       Plan    Plan Return home. Refused interview    Plan Comments Discharge orders for today. Refused to talk to CM.                      Phone communication or documentation only - no  physical contact with patient or family.     Jimena PADILLA,RN Case Manager  [facility]  Phone: Desk- [phone]  Cell- [phone]     Electronically signed by Jimena Corral at 08/02/23 1023       Fide Howe, RN at 08/02/23 0502          Goal Outcome Evaluation:              Outcome Evaluation: PT pleasant but uncooperative this shift. PT refused all medication. PT alert and able to make wants and needs known. PT refused all monitoring devises this shift. No s/sx of acute distress noted at this time.           Electronically signed by Fide Howe RN at 08/02/23 0503       Bebe Lemus LPN at 08/01/23 1814          Patient refusing insulin at this time but compliant with most PO medications today. She refused Risperdal this morning.     Electronically signed by Bebe Lemus LPN at 08/01/23 1815       Bebe Lemus LPN at 08/01/23 1813            Problem: Adult Inpatient Plan of Care  Goal: Plan of Care Review  Outcome: Ongoing, Progressing  Goal: Patient-Specific Goal (Individualized)  Outcome: Ongoing, Progressing  Goal: Absence of Hospital-Acquired Illness or Injury  Outcome: Ongoing, Progressing  Intervention: Identify and Manage Fall Risk  Recent Flowsheet Documentation  Taken 8/1/2023 1812 by Bebe Lemus LPN  Safety Promotion/Fall Prevention: safety round/check completed  Taken 8/1/2023 1600 by Bebe Lemus LPN  Safety Promotion/Fall Prevention:   safety round/check completed   clutter free environment maintained  Taken 8/1/2023 1400 by Bebe Lemus LPN  Safety Promotion/Fall Prevention: safety round/check completed  Taken 8/1/2023 1200 by Bebe Lemus LPN  Safety Promotion/Fall Prevention: safety round/check completed  Taken 8/1/2023 1038 by Bebe Lemus LPN  Safety Promotion/Fall Prevention:   safety round/check completed   activity supervised  Taken 8/1/2023 0800 by Bebe Lemus LPN  Safety Promotion/Fall Prevention: safety round/check  completed  Taken 8/1/2023 0700 by Bebe Lemus LPN  Safety Promotion/Fall Prevention: (linens picked up and bed alarm turned on)   clutter free environment maintained   fall prevention program maintained  Intervention: Prevent Skin Injury  Recent Flowsheet Documentation  Taken 8/1/2023 1038 by Bebe Lemus LPN  Body Position: position changed independently  Skin Protection:   adhesive use limited   pulse oximeter probe site changed   tubing/devices free from skin contact  Intervention: Prevent and Manage VTE (Venous Thromboembolism) Risk  Recent Flowsheet Documentation  Taken 8/1/2023 1600 by Bebe Lemus LPN  VTE Prevention/Management:   compression stockings off   sequential compression devices off  Taken 8/1/2023 1038 by Bebe Lemus LPN  Activity Management: ambulated to bathroom  VTE Prevention/Management: (patient up adlib)   compression stockings off   sequential compression devices off  Range of Motion: ROM (range of motion) performed  Intervention: Prevent Infection  Recent Flowsheet Documentation  Taken 8/1/2023 1812 by Bebe Lemus LPN  Infection Prevention:   cohorting utilized   single patient room provided  Taken 8/1/2023 1600 by Bebe Lemus LPN  Infection Prevention: single patient room provided  Taken 8/1/2023 1400 by Bebe Lemus LPN  Infection Prevention:   cohorting utilized   single patient room provided  Taken 8/1/2023 1200 by Bebe Lemus LPN  Infection Prevention:   cohorting utilized   single patient room provided  Taken 8/1/2023 1038 by Bebe Lemus LPN  Infection Prevention:   cohorting utilized   single patient room provided  Taken 8/1/2023 0800 by Bebe Lemus LPN  Infection Prevention:   cohorting utilized   single patient room provided  Goal: Optimal Comfort and Wellbeing  Outcome: Ongoing, Progressing  Intervention: Monitor Pain and Promote Comfort  Recent Flowsheet Documentation  Taken 8/1/2023 1038 by Bebe Lemus LPN  Pain Management Interventions:   pillow  support provided   position adjusted   see MAR  Taken 8/1/2023 1000 by Bebe Lemus LPN  Pain Management Interventions:   pillow support provided   position adjusted  Intervention: Provide Person-Centered Care  Recent Flowsheet Documentation  Taken 8/1/2023 1038 by Bebe Lemus LPN  Trust Relationship/Rapport:   care explained   choices provided   emotional support provided   empathic listening provided   questions answered   questions encouraged   thoughts/feelings acknowledged  Goal: Readiness for Transition of Care  Outcome: Ongoing, Progressing     Problem: Pain Acute  Goal: Acceptable Pain Control and Functional Ability  Outcome: Ongoing, Progressing  Intervention: Prevent or Manage Pain  Recent Flowsheet Documentation  Taken 8/1/2023 1812 by Bebe Lemus LPN  Medication Review/Management: medications reviewed  Taken 8/1/2023 1600 by Bebe Lemus LPN  Medication Review/Management: medications reviewed  Taken 8/1/2023 1400 by Bebe Lemus LPN  Medication Review/Management: medications reviewed  Taken 8/1/2023 1200 by Bebe Lemus LPN  Medication Review/Management: medications reviewed  Taken 8/1/2023 1038 by Bebe Lemus LPN  Sensory Stimulation Regulation: visual stimulation minimized  Sleep/Rest Enhancement: awakenings minimized  Medication Review/Management: medications reviewed  Taken 8/1/2023 0800 by Bebe Lemus LPN  Medication Review/Management: medications reviewed  Intervention: Develop Pain Management Plan  Recent Flowsheet Documentation  Taken 8/1/2023 1038 by Bebe Lemus LPN  Pain Management Interventions:   pillow support provided   position adjusted   see MAR  Taken 8/1/2023 1000 by Bebe Lemus LPN  Pain Management Interventions:   pillow support provided   position adjusted  Intervention: Optimize Psychosocial Wellbeing  Recent Flowsheet Documentation  Taken 8/1/2023 1038 by Bebe Lemus LPN  Supportive Measures: active listening utilized  Diversional Activities:    television   music     Problem: Behavioral Health Comorbidity  Goal: Maintenance of Behavioral Health Symptom Control  Outcome: Ongoing, Progressing  Intervention: Maintain Behavioral Health Symptom Control  Recent Flowsheet Documentation  Taken 8/1/2023 1812 by Bebe Lemus LPN  Medication Review/Management: medications reviewed  Taken 8/1/2023 1600 by Bebe Lemus LPN  Medication Review/Management: medications reviewed  Taken 8/1/2023 1400 by Bebe Lemus LPN  Medication Review/Management: medications reviewed  Taken 8/1/2023 1200 by Bebe Lemus LPN  Medication Review/Management: medications reviewed  Taken 8/1/2023 1038 by Bebe Lemus LPN  Medication Review/Management: medications reviewed  Taken 8/1/2023 0800 by Bebe Lemus LPN  Medication Review/Management: medications reviewed     Problem: Fall Injury Risk  Goal: Absence of Fall and Fall-Related Injury  Outcome: Ongoing, Progressing  Intervention: Identify and Manage Contributors  Recent Flowsheet Documentation  Taken 8/1/2023 1812 by Bebe Lemus LPN  Medication Review/Management: medications reviewed  Taken 8/1/2023 1600 by Bebe Lemus LPN  Medication Review/Management: medications reviewed  Taken 8/1/2023 1400 by Bebe Lemus LPN  Medication Review/Management: medications reviewed  Taken 8/1/2023 1200 by Bebe Lemus LPN  Medication Review/Management: medications reviewed  Taken 8/1/2023 1038 by Bebe Lemus LPN  Medication Review/Management: medications reviewed  Self-Care Promotion: independence encouraged  Taken 8/1/2023 0800 by Bebe Lemus LPN  Medication Review/Management: medications reviewed  Intervention: Promote Injury-Free Environment  Recent Flowsheet Documentation  Taken 8/1/2023 1812 by Bebe Lemus LPN  Safety Promotion/Fall Prevention: safety round/check completed  Taken 8/1/2023 1600 by Bebe Lemus LPN  Safety Promotion/Fall Prevention:   safety round/check completed   clutter free environment  maintained  Taken 8/1/2023 1400 by Bebe Lemus LPN  Safety Promotion/Fall Prevention: safety round/check completed  Taken 8/1/2023 1200 by Bebe Lemus LPN  Safety Promotion/Fall Prevention: safety round/check completed  Taken 8/1/2023 1038 by Bebe Lemus LPN  Safety Promotion/Fall Prevention:   safety round/check completed   activity supervised  Taken 8/1/2023 0800 by Bebe Lemus LPN  Safety Promotion/Fall Prevention: safety round/check completed  Taken 8/1/2023 0700 by Bebe Lemus LPN  Safety Promotion/Fall Prevention: (linens picked up and bed alarm turned on)   clutter free environment maintained   fall prevention program maintained   Goal Outcome Evaluation:                          Electronically signed by Bebe Lemus LPN at 08/01/23 1813       Bebe Lemus LPN at 08/01/23 1433          Patient states she has no thoughts of suicide or plans to harm herself. Psych saw patient at bedside today, see orders.    Electronically signed by Bebe Lemus LPN at 08/01/23 1435       Jimena Corral at 08/01/23 1330          Continued Stay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 remained tearful, agitated, " "and moved back and forth between descriptions of powerlessness and rage. Pt does not affiliate with any Scientologist tradition nor does she \"want to hear any of that prayer or Bible crap from people who have no right to speak it.\" Pt did state that the \"chp was nice to talk to\" and requested future check-ins.      Chp offered supportive presence, SW consulted. SW clarified that pt would have evaluation with psych once she was able to meaningfully engage in conversation again. Chp to continue providing routine visits at least once weekly throughout admission, additional support available as requested/ needed.     Electronically signed by Domonique Kim at 23 1019       Kar Trevino, RN at 23 0550          Goal Outcome Evaluation:      Pt spoke very little during admission and shift. Did not answer many questions. Turned bed alarm on and made frequent safety checks. Pt ended up resting most of the shift.                    Electronically signed by Kar Trevino RN at 23 1579       Arely Valentin RN at 23 0483          Patient started coughing, RN entered room to assess patient. Patient continued to cough, bed sat up. Patient stopped coughing and RN listened to lung sounds which were clear and noted oxygen saturations to be 95% on room air. Patient was tearful and said 'everyone is messing with me' and reported 'i'm not messing with anyone and I don't want anyone messing with me.' Emotional support provided to patient and asked about needs. Patient tearful and reports being hungry and thirsty. RN offered can of soda but patient reported 'i just want to be left alone.' RN left room, patient on monitor and door open. Lights dimmed in room    Electronically signed by Arely Valentin, RN at 23 7413       Andres Meyer MD at 23 1646              [facility] Medicine Services      Patient Name: Simran Wall  : 1970  MRN: " "[id number]  Primary Care Physician:  John Martino MD  Date of admission: 7/31/2023    Subjective      Chief Complaint: Change in mental status    History of Present Illness: Simran Wall is a 53 y.o. female with history of methamphetamine use . who [facility] 7/31/[facility]23 acutely confused, per patient\" went to gas station to get gas. I thought my car was going to blow up:   Patient was seen earlier today in ER for altered mental status, paranoia thought to be secondary to methamphetamine use but was discharged.  Patient was seen by this provider at that time, she at that time she was alert and oriented x4, answer questions appropriately, respond to verbal commands and was able to ambulate with non-ataxic gait.  Patient was discharged completely clothed, but was then found a few hours later in the surgical department with only a towel wrapped around her otherwise completely naked.  When asked why the patient was in the surgical department she states \"I do not know\".  When asked what she was doing she states \"I did not do what you accused me of doing\".  When asked if she left the hospital before finding her way to the surgical department she states \"what's it to you I do not need to tell you that\".  Patient then crosses her arms sits on the exam stretcher and does not look at me.  She does not complain of any pain.  She is in no acute respiratory distress.      CT Head Without Contrast-Result Date: 7/31/2023-.No acute intracranial abnormality is identified. 2.Periventricular white matter hypoattenuation may be related to chronic microvascular ischemic change. This is greater than expected given the patient's age. Follow-up MRI may be considered if clinically indicated. Electronically Signed: Aguirre Rodriguez  7/31/2023 7:58 AM    PCP: John Martino     History provided by:  Patient    Review of Systems   Constitutional: Positive for malaise/fatigue.   HENT: Negative.     Eyes: Negative.  "   Cardiovascular: Negative.    Respiratory: Negative.     Endocrine: Negative.    Hematologic/Lymphatic: Negative.    Skin: Negative.    Musculoskeletal: Negative.    Gastrointestinal: Negative.    Genitourinary: Negative.    Neurological: Negative.    Psychiatric/Behavioral:  Positive for altered mental status and substance abuse.    Allergic/Immunologic: Negative.    All other systems reviewed and are negative.     Personal History     No past medical history on file.    No past surgical history on file.    Family History: family history is not on file. Otherwise pertinent FHx was reviewed and not pertinent to current issue.    Social History:      Home Medications:  Prior to Admission Medications       None          Allergies:  Allergies   Allergen Reactions    Cephalexin Unknown (See Comments)    Ibuprofen Unknown (See Comments)       Objective      Vitals:   Temp:  [98.9 øF (37.2 øC)] 98.9 øF (37.2 øC)  Heart Rate:  [] 97  Resp:  [17-20] 20  BP: (125-174)/(65-97) 141/78    Physical Exam  Vitals and nursing note reviewed.   Constitutional:       General: She is not in acute distress.     Appearance: She is well-developed. She is obese. She is not ill-appearing, toxic-appearing or diaphoretic.   HENT:      Head: Normocephalic and atraumatic.      Nose: Nose normal. No congestion or rhinorrhea.      Mouth/Throat:      Mouth: Mucous membranes are moist.      Pharynx: No oropharyngeal exudate.   Eyes:      General: No scleral icterus.        Right eye: No discharge.         Left eye: No discharge.      Extraocular Movements: Extraocular movements intact.      Conjunctiva/sclera: Conjunctivae normal.      Pupils: Pupils are equal, round, and reactive to light.   Neck:      Thyroid: No thyromegaly.      Vascular: No carotid bruit or JVD.      Trachea: No tracheal deviation.   Cardiovascular:      Rate and Rhythm: Normal rate and regular rhythm.      Pulses: Normal pulses.      Heart sounds: Normal heart sounds.  No murmur heard.    No friction rub. No gallop.   Pulmonary:      Effort: Pulmonary effort is normal. No respiratory distress.      Breath sounds: Normal breath sounds. No stridor. No wheezing, rhonchi or rales.   Chest:      Chest wall: No tenderness.   Abdominal:      General: Bowel sounds are normal. There is no distension.      Palpations: Abdomen is soft. There is no mass.      Tenderness: There is no abdominal tenderness. There is no guarding or rebound.      Hernia: No hernia is present.   Musculoskeletal:         General: No swelling, tenderness, deformity or signs of injury. Normal range of motion.      Cervical back: Normal range of motion and neck supple. No rigidity. No muscular tenderness.      Right lower leg: No edema.      Left lower leg: No edema.   Lymphadenopathy:      Cervical: No cervical adenopathy.   Skin:     General: Skin is warm and dry.      Coloration: Skin is not jaundiced or pale.      Findings: No bruising, erythema or rash.   Neurological:      General: No focal deficit present.      Mental Status: She is alert and oriented to person, place, and time.      Cranial Nerves: No cranial nerve deficit.      Sensory: No sensory deficit.      Motor: No weakness or abnormal muscle tone.      Coordination: Coordination normal.   Psychiatric:         Mood and Affect: Mood normal.        Result Review    Result Review:  I have personally reviewed the results from the time of this admission to 7/31/2023 18:22 EDT and agree with these findings:  []  Laboratory  []  Microbiology  []  Radiology  []  EKG/Telemetry   []  Cardiology/Vascular   []  Pathology  []  Old records  []  Other:  Most notable findings include: CMP:        Lab 07/31/23  1601 07/31/23  0526   SODIUM 140 139   POTASSIUM 3.6 3.7   CHLORIDE 102 101   CO2 24.0 24.0   ANION GAP 14.0 14.0   BUN 16 19   CREATININE 0.66 0.74   EGFR 103.7 95.7   GLUCOSE 173* 239*   CALCIUM 9.1 9.3   MAGNESIUM  --  1.9   TOTAL PROTEIN 7.6 7.8   ALBUMIN 4.4  4.6   GLOBULIN 3.2 3.2   ALT (SGPT) 13 14   AST (SGOT) 14 16   BILIRUBIN 0.7 0.5   ALK PHOS 72 74    CBC:      Lab 07/31/23  1402 07/31/23  0526   WBC 11.00* 11.20*   HEMOGLOBIN 13.9 13.9   HEMATOCRIT 42.2 41.4   PLATELETS 245 284   NEUTROS ABS 5.60 6.20   LYMPHS ABS 4.30* 4.20*   MONOS ABS 0.80 0.80   EOS ABS 0.10 0.10   MCV 90.2 88.6        Assessment & Plan        Active Hospital Problems:  Active Hospital Problems    Diagnosis     **Mental status alteration     Type 2 diabetes mellitus     Hypothyroidism     Hypertension      Change in mental status, Delirium -Substance abuse-methamphetamines, possible underlying depression  Plan:   -Admit to [facility]  -Psych consult.  -IV fluids.  -Risperdal twice daily  CT -1.No acute intracranial abnormality is identified.  2.Periventricular white matter hypoattenuation may be related to chronic microvascular ischemic change. This is greater than expected given the patient's age. Follow-up MRI may be considered if clinically indicated.     Leukocytosis. Likely reactive,  -Monitor CBC. Check procal  -UA negative for nitrite and bacteria, trace leukocytes    Hypertension-/72  -Add lisinopril 5 mg    Type 2 diabetes  -Insulin sliding scale  -Diabetic diet  -Glucometers ACH S    Hypothyroidism  -TSH-30  -Check T4  -Home meds not reviewed.  -Start Synthroid 50 mcg daily      DVT prophylaxis:  No DVT prophylaxis order currently exists.    CODE STATUS:       Admission Status:  I believe this patient meets admit status.    I discussed the patient's findings and my recommendations with patient and nursing staff.    This patient has been examined wearing appropriate Personal Protective Equipment and discussed with  rn . 07/31/23      Signature: Electronically signed by Andres Meyer MD, 07/31/23, 6:10 PM EDT.     Electronically signed by Andres Meyer MD at 07/31/23 1142       Arely Valentin, RN at 07/31/23 1430          Provider at bedside and reports patient can   have a drink, patient reports she would not like a fountain drink and wants a drink that she can open herself. Patient given a can of umair but patient was unable to open it herself. RN offered to help patient open can but patient declined the help.     Electronically signed by Arely Valentin RN at 07/31/23 8249        To prevent performance issues, not all notes are shown.    Go to Chart Review to see the rest of the notes.

## 2023-08-15 NOTE — DISCHARGE PLACEMENT REQUEST
"Simran Wall (53 y.o. Female)       Date of Birth   1970    Social Security Number       Address   [address] [place] [address] [place] [address]    Home Phone   [phone]    MRN   [id number]       Orthodoxy   None    Marital Status                               Admission Date   8/6/23    Admission Type   Emergency    Admitting Provider   Gavin Michelle MD    Attending Provider       Department, Room/Bed   [facility] EMERGENCY DEPARTMENT, 08/08       Discharge Date   8/7/2023    Discharge Disposition   Home or Self Care    Discharge Destination                                 Attending Provider: (none)   Allergies: Cephalexin, Ibuprofen    Isolation: None   Infection: None   Code Status: CPR    Ht: 157.5 cm (62\")   Wt: 93.9 kg (207 lb)    Admission Cmt: None   Principal Problem: None                  Active Insurance as of 8/6/2023       Primary Coverage       Payor Plan Insurance Group Employer/Plan Group    HUMANA MEDICARE REPLACEMENT HUMANA MEDICARE REPLACEMENT [id number]       Payor Plan Address Payor Plan Phone Number Payor Plan Fax Number Effective Dates    [address] [address] [phone]  3/1/2023 - None Entered    [facility] [id number]         Subscriber Name Subscriber Birth Date Member ID       SIMRAN WALL 1970 [id number]               Secondary Coverage       Payor Plan Insurance Group Employer/Plan Group    [place]        Payor Plan Address Payor Plan Phone Number Payor Plan Fax Number Effective Dates    [address]   5/24/2021 - None Entered    [place] [address]         Subscriber Name Subscriber Birth Date Member ID       SIMRAN WALL 1970 [id number]                     Emergency Contacts        (Rel.) Home Phone Work Phone Mobile Phone    WOODROW WALL (Daughter) -- -- [phone]    CLARITZA NUNEZ (Son) -- -- [phone]                 Discharge Summaryl        Gavin Michelle MD at 08/06/23 2148          Rest next 24 " hours, no work  Avoid exertional activity or heat exposure for the next 3 days  Plenty of fluids, Tylenol for discomfort  Seek help for your severe methamphetamine use problem    Electronically signed by Gavin Michelle MD at 08/06/23 2149       Gavin Michelle MD at 08/06/23 2141          Subjective  History of Present Illness  53-year-old female was brought in by EMS after police found her sitting unrestrained in a nondamage to vehicle that was parked on the sidewalk and partially on [address].  The patient indicates she may have used some methamphetamine today.  The patient was apparently seen recently in the ER for methamphetamine use.  Patient reports no intent to self-harm or harm others.  She reportedly appeared to be hallucinating at the scene.  She denies current auditory hallucination but states she feels restless    Review of Systems   Unable to perform ROS: Other (Methamphetamine intoxication)   Constitutional:  Negative for chills.     No past medical history on file.  History of methamphetamine abuse, history of hypertension, history of hypothyroidism, history of depression, history of substance use disorder  Allergies   Allergen Reactions    Cephalexin Unknown (See Comments)    Ibuprofen Unknown (See Comments)       No past surgical history on file.    No family history on file.    Social History     Socioeconomic History    Marital status:    Tobacco Use    Smoking status: Never    Smokeless tobacco: Never   Vaping Use    Vaping Use: Never used   Substance and Sexual Activity    Alcohol use: Never    Drug use: Never     Comment: Pt states no drug use, tox screen positive amphetamines    Sexual activity: Defer       No reported safety issues    Objective  Physical Exam  Alert, agitated, initially confused Jayla Coma Scale 14   HEENT: Pupils equal and reactive to light. Conjunctivae are not injected. Normal tympanic membranes. Oropharynx and nares are normal.   Neck: Supple. Midline  trachea. No JVD. No goiter.   Chest: Clear and equal breath sounds bilaterally, regular rate and rhythm without murmur or rub.   Abdomen: Positive bowel sounds, nontender, nondistended. No rebound or peritoneal signs. No CVA tenderness.   Extremities/neuro no focal neurologic defects cranial nerves intact no clubbing. cyanosis or edema. Motor sensory exam is normal. The full range of motion is intact   Skin: Warm and dry, no rashes or petechia.   Lymphatic: No regional lymphadenopathy. No calf pain, swelling or Homans sign    Procedures           ED Course           Labs Reviewed   COMPREHENSIVE METABOLIC PANEL - Abnormal; Notable for the following components:       Result Value    Glucose 220 (*)     Anion Gap 16.0 (*)     All other components within normal limits    Narrative:     GFR Normal >60  Chronic Kidney Disease <60  Kidney Failure <15     URINALYSIS W/ CULTURE IF INDICATED - Abnormal; Notable for the following components:    Specific Gravity, UA 1.034 (*)     Glucose, UA >=1000 mg/dL (3+) (*)     Ketones, UA Trace (*)     Protein, UA Trace (*)     All other components within normal limits    Narrative:     In absence of clinical symptoms, the presence of pyuria, bacteria, and/or nitrites on the urinalysis result does not correlate with infection.  Urine microscopic not indicated.   SINGLE HSTROPONIN T - Abnormal; Notable for the following components:    HS Troponin T 16 (*)     All other components within normal limits    Narrative:     High Sensitive Troponin T Reference Range:  <10.0 ng/L- Negative Female for AMI  <15.0 ng/L- Negative Male for AMI  >=10 - Abnormal Female indicating possible myocardial injury.  >=15 - Abnormal Male indicating possible myocardial injury.   Clinicians would have to utilize clinical acumen, EKG, Troponin, and serial changes to determine if it is an Acute Myocardial Infarction or myocardial injury due to an underlying chronic condition.        CK - Abnormal; Notable for the  following components:    Creatine Kinase 306 (*)     All other components within normal limits   CBC WITH AUTO DIFFERENTIAL - Abnormal; Notable for the following components:    Lymphocytes, Absolute 4.30 (*)     All other components within normal limits   URINE DRUG SCREEN - Abnormal; Notable for the following components:    Amphet/Methamphet, Screen Positive (*)     All other components within normal limits    Narrative:     Negative Thresholds Per Drugs Screened:    Amphetamines                 500 ng/ml  Barbiturates                 200 ng/ml  Benzodiazepines              100 ng/ml  Cocaine                      300 ng/ml  Methadone                    300 ng/ml  Opiates                      300 ng/ml  Oxycodone                    100 ng/ml  THC                           50 ng/ml    The Normal Value for all drugs tested is negative. This report includes final unconfirmed screening results to be used for medical treatment purposes only. Unconfirmed results must not be used for non-medical purposes such as employment or legal testing. Clinical consideration should be applied to any drug of abuse test, particularly when unconfirmed results are used.          All urine drugs of abuse requests without chain of custody are for medical screening purposes only.  False positives are possible.     MAGNESIUM - Normal   ETHANOL    Narrative:     Plasma Ethanol Clinical Symptoms:    ETOH (%)               Clinical Symptom  .01-.05              No apparent influence  .03-.12              Euphoria, Diminished judgment and attention   .09-.25              Impaired comprehension, Muscle incoordination  .18-.30              Confusion, Staggered gait, Slurred speech  .25-.40              Markedly decreased response to stimuli, unable to stand or                        walk, vomitting, sleep or stupor  .35-.50              Comatose, Anesthesia, Subnormal body temperature       CBC AND DIFFERENTIAL    Narrative:     The following  orders were created for panel order CBC & Differential.  Procedure                               Abnormality         Status                     ---------                               -----------         ------                     CBC Auto Differential[[id number]]        Abnormal            Final result                 Please view results for these tests on the individual orders.     Medications   sodium chloride 0.9% - IBW for BMI > 30 bolus 1,500 mL (1,500 mL Intravenous New Bag 8/6/23 2055)   lactated ringers bolus 500 mL (has no administration in time range)   LORazepam (ATIVAN) injection 1 mg (1 mg Intravenous Given 8/6/23 2051)     No radiology results for the last day                                  Medical Decision Making  The patient was given substance abuse referral.  The patient is completing hydration and will be discharged.  The patient was no longer agitated.  He was agreeable to this plan of treatment    Amount and/or Complexity of Data Reviewed  Labs: ordered. Decision-making details documented in ED Course.    Risk  Prescription drug management.        Final diagnoses:   Methamphetamine abuse   Methamphetamine use disorder, severe   Methamphetamine intoxication       ED Disposition  ED Disposition       ED Disposition   Discharge    Condition   Stable    Comment   --               John Martino MD  [address] [place] [facility]  [phone]          For help with substance use disorder treatment call  [phone]             Medication List      No changes were made to your prescriptions during this visit.            Gavin Michelle MD  08/06/23 2149      Electronically signed by Gavin Michelle MD at 08/06/23 2142

## 2023-08-15 NOTE — CASE MANAGEMENT/SOCIAL WORK
Continued Stay Note  JAQUELINE Salmon     Patient Name: Simran Wall  MRN: [id number]  Today's Date: 8/15/2023    Admit Date: 8/11/2023    Plan: Return home alone. Declines SNF.   Discharge Plan       Row Name 08/15/23 1423       Plan    Plan Return home alone. Declines SNF.    Patient/Family in Agreement with Plan yes    Plan Comments CM received notification from [facility] liaison Leigha that they will not be able to accept pt. Received phone call from patient who now reports she wants to go home. CM updated hospitalist and nursing by secure chat. CM contacted patient's PCP office, Dr Martino and scheduled hospital f/u appt 8/25. Added insurance transportation phone numbers to AVS along with PCP appt. CM setting up hospital discharge transportation via [facility] [employer]. Spoke to SINDY, trip ID #[id number].        Megan Naegele, RN     Office Phone: [phone]  Office Cell: [phone]

## 2023-08-15 NOTE — PLAN OF CARE
Goal Outcome Evaluation:              Outcome Evaluation: PT slept well during the night.  No complaints.  Stable at this time.

## 2023-08-15 NOTE — THERAPY EVALUATION
Patient Name: Simran Wall  : 1970    MRN: [id number]                              Today's Date: 8/15/2023       Admit Date: 2023    Visit Dx:     ICD-10-CM ICD-9-CM   1. Amphetamine use  F15.90 305.70   2. Altered mental status, unspecified altered mental status type  R41.82 780.97     Patient Active Problem List   Diagnosis    Type 2 diabetes mellitus    Hypothyroidism    Hypertension    Mental status alteration    Delirium    UTI (urinary tract infection)    Chronic mental illness    Drug abuse     Past Medical History:   Diagnosis Date    Diabetes mellitus     Hypertension      History reviewed. No pertinent surgical history.   General Information       Row Name 08/15/23 1035          Physical Therapy Time and Intention    Document Type evaluation  -AM     Mode of Treatment physical therapy  -AM       Row Name 08/15/23 1035          General Information    Patient Profile Reviewed yes  -AM     Prior Level of Function independent:;gait;transfer;bed mobility;using stairs  -AM     Existing Precautions/Restrictions fall  -AM     Barriers to Rehab none identified  -AM       Row Name 08/15/23 1035          Living Environment    People in Home alone  -AM       Row Name 08/15/23 1035          Home Main Entrance    Number of Stairs, Main Entrance three  -AM     Stair Railings, Main Entrance railings safe and in good condition  -AM       Row Name 08/15/23 1035          Stairs Within Home, Primary    Number of Stairs, Within Home, Primary none  -AM       Row Name 08/15/23 1035          Cognition    Orientation Status (Cognition) oriented to;person;time;disoriented to;place  -AM       Row Name 08/15/23 1035          Safety Issues, Functional Mobility    Impairments Affecting Function (Mobility) balance;endurance/activity tolerance;pain;strength  -AM     Comment, Safety Issues/Impairments (Mobility) gait belt utilized  -AM               User Key  (r) = Recorded By, (t) = Taken By, (c) = Cosigned By       Initials Name Provider Type    AM Roland Amaro, PT Physical Therapist                   Mobility       Row Name 08/15/23 1036          Bed Mobility    Bed Mobility supine-sit  -AM     Supine-Sit Brooks (Bed Mobility) supervision  -AM     Assistive Device (Bed Mobility) bed rails;head of bed elevated  -AM       Row Name 08/15/23 1036          Sit-Stand Transfer    Sit-Stand Brooks (Transfers) contact guard;1 person assist  -AM       Row Name 08/15/23 1036          Gait/Stairs (Locomotion)    [place]oks Level (Gait) minimum assist (75% patient effort);1 person assist  -AM     Distance in Feet (Gait) 10'  -AM     Comment, (Gait/Stairs) decreased balance, flexed trunk, decreased gait speed, decreased endurance  -AM               User Key  (r) = Recorded By, (t) = Taken By, (c) = Cosigned By      Initials Name Provider Type    AM Roland Amaro, PT Physical Therapist                   Obj/Interventions       Row Name 08/15/23 1037          Range of Motion Comprehensive    General Range of Motion no range of motion deficits identified  -AM       Row Name 08/15/23 1037          Strength Comprehensive (MMT)    Comment, General Manual Muscle Testing (MMT) Assessment 4/5 except L knee flexion 4-/5  -AM       Row Name 08/15/23 1037          Motor Skills    Motor Skills functional endurance  -AM     Functional Endurance fair  -AM       Row Name 08/15/23 1037          Balance    Balance Assessment sitting static balance;sitting dynamic balance;sit to stand dynamic balance;standing static balance;standing dynamic balance  -AM     Static Sitting Balance supervision  -AM     Dynamic Sitting Balance supervision  -AM     Position, Sitting Balance unsupported;sitting edge of bed  -AM     Sit to Stand Dynamic Balance contact guard  -AM     Dynamic Standing Balance minimal assist  -AM     Position/Device Used, Standing Balance unsupported  -AM       Row Name 08/15/23 1037          Sensory Assessment (Somatosensory)     Sensory Assessment (Somatosensory) sensation intact  -AM               User Key  (r) = Recorded By, (t) = Taken By, (c) = Cosigned By      Initials Name Provider Type    AM Roland Amaro, PT Physical Therapist                   Goals/Plan       Row Name 08/15/23 1043          Bed Mobility Goal 1 (PT)    Activity/Assistive Device (Bed Mobility Goal 1, PT) bed mobility activities, all  -AM     [place] Level/Cues Needed (Bed Mobility Goal 1, PT) modified independence  -AM     Time Frame (Bed Mobility Goal 1, PT) long term goal (LTG)  -AM       Row Name 08/15/23 1043          Transfer Goal 1 (PT)    Activity/Assistive Device (Transfer Goal 1, PT) transfers, all  -AM     [place] Level/Cues Needed (Transfer Goal 1, PT) modified independence  -AM     Time Frame (Transfer Goal 1, PT) long term goal (LTG)  -AM       Row Name 08/15/23 1043          Gait Training Goal 1 (PT)    Activity/Assistive Device (Gait Training Goal 1, PT) gait (walking locomotion)  assistive device PRN  -AM     [place] Level (Gait Training Goal 1, PT) modified independence  -AM     Distance (Gait Training Goal 1, PT) 150'  -AM       [facility] Name 08/15/23 1043          Stairs Goal 1 (PT)    Activity/Assistive Device (Stairs Goal 1, PT) stairs, all skills  -AM     [place] Level/Cues Needed (Stairs Goal 1, PT) modified independence  -AM     Number of Stairs (Stairs Goal 1, PT) 3  -AM     Time Frame (Stairs Goal 1, PT) long term goal (LTG)  -AM       Row Name 08/15/23 1043          Therapy Assessment/Plan (PT)    Planned Therapy Interventions (PT) balance training;bed mobility training;gait training;strengthening;stair training;patient/family education;transfer training  -AM               User Key  (r) = Recorded By, (t) = Taken By, (c) = Cosigned By      Initials Name Provider Type    AM Roland Amaro, PT Physical Therapist                   Clinical Impression       Row Name 08/15/23 1039          Pain    Pretreatment Pain Rating 5/10   "-AM     Posttreatment Pain Rating 5/10  -AM     Pain Location - Side/Orientation Left  -AM     Pain Location - knee  -AM     Pain Intervention(s) Repositioned;Emotional support  -AM       Row Name 08/15/23 1039          Plan of Care Review    Plan of Care Reviewed With patient  -AM     Outcome Evaluation Pt is a 52 y/o female who came to ED stating \"I'm scared\".  Pt (+) UTI, (+) meth.  Pt with recent hospitalization secondary to meth induced delirium with hx of mental illness.  CT head: (-) acute.  Pt reports she lives alone in a mobile home wtih 3 steps to enter into home.  Pt was independent with ambulation and did not use an assitive device prior to hospitalization.  Pt on room air this date and not oriented to place.  Pt required supervision with bed mobility, CGA for transfers and ambulated 10' with Min A.  Pt with decreased balance with ambulation this date.  Unable to ambulate further secondary to fatigue.  Recommend SNF.  -AM       Row Name 08/15/23 1039          Therapy Assessment/Plan (PT)    Patient/Family Therapy Goals Statement (PT) To get stronger  -AM     Rehab Potential (PT) good, to achieve stated therapy goals  -AM     Criteria for Skilled Interventions Met (PT) yes  -AM     Therapy Frequency (PT) 5 times/wk  -AM     Predicted Duration of Therapy Intervention (PT) until d/c  -AM       Row Name 08/15/23 1039          Vital Signs    O2 Delivery Pre Treatment room air  -AM     O2 Delivery Intra Treatment room air  -AM     O2 Delivery Post Treatment room air  -AM     Pre Patient Position Supine  -AM     Intra Patient Position Standing  -AM     Post Patient Position Sitting  -AM       Row Name 08/15/23 1039          Positioning and Restraints    Pre-Treatment Position in bed  -AM     Post Treatment Position chair  -AM     In Chair sitting;call light within reach;encouraged to call for assist;exit alarm on;with nsg  -AM               User Key  (r) = Recorded By, (t) = Taken By, (c) = Cosigned By      " Initials Name Provider Type    Roland Rowell, PT Physical Therapist                   Outcome Measures       Row Name 08/15/23 1044 08/15/23 0845       How much help from another person do you currently need...    Turning from your back to your side while in flat bed without using bedrails? 3  -AM 4  -AH    Moving from lying on back to sitting on the side of a flat bed without bedrails? 3  -AM 4  -AH    Moving to and from a bed to a chair (including a wheelchair)? 3  -AM 3  -AH    Standing up from a chair using your arms (e.g., wheelchair, bedside chair)? 3  -AM 3  -AH    Climbing 3-5 steps with a railing? 2  -AM 2  -AH    To walk in hospital room? 3  -AM 3  -AH    AM-PAC 6 Clicks Score (PT) 17  -AM 19  -AH    Highest level of mobility 5 --> Static standing  -AM 6 --> Walked 10 steps or more  -AH      Row Name 08/15/23 1044          Functional Assessment    Outcome Measure Options AM-PAC 6 Clicks Basic Mobility (PT)  -AM               User Key  (r) = Recorded By, (t) = Taken By, (c) = Cosigned By      Initials Name Provider Type    Gaby Saeed, RN Registered Nurse    Roland Rowell, PT Physical Therapist                                 Physical Therapy Education       Title: PT OT SLP Therapies (Done)       Topic: Physical Therapy (Done)       Point: Mobility training (Done)       Learning Progress Summary             Patient Acceptance, E,TB, VU by AM at 8/15/2023 1116                         Point: Home exercise program (Done)       Learning Progress Summary             Patient Acceptance, E,TB, VU by AM at 8/15/2023 1116                         Point: Body mechanics (Done)       Learning Progress Summary             Patient Acceptance, E,TB, VU by AM at 8/15/2023 1116                         Point: Precautions (Done)       Learning Progress Summary             Patient Acceptance, E,TB, VU by AM at 8/15/2023 1116                                         User Key       Initials Effective Dates Name  "Provider Type Discipline    AM 05/10/21 -  Roland Amaro PT Physical Therapist PT                  PT Recommendation and Plan  Planned Therapy Interventions (PT): balance training, bed mobility training, gait training, strengthening, stair training, patient/family education, transfer training  Plan of Care Reviewed With: patient  Outcome Evaluation: Pt is a 52 y/o female who came to ED stating \"I'm scared\".  Pt (+) UTI, (+) meth.  Pt with recent hospitalization secondary to meth induced delirium with hx of mental illness.  CT head: (-) acute.  Pt reports she lives alone in a mobile home wtih 3 steps to enter into home.  Pt was independent with ambulation and did not use an assitive device prior to hospitalization.  Pt on room air this date and not oriented to place.  Pt required supervision with bed mobility, CGA for transfers and ambulated 10' with Min A.  Pt with decreased balance with ambulation this date.  Unable to ambulate further secondary to fatigue.  Recommend SNF.     Time Calculation:         PT Charges       Row Name 08/15/23 1121             Time Calculation    Start Time 0843  -AM      Stop Time 0904  -AM      Time Calculation (min) 21 min  -AM      PT Received On 08/15/23  -AM      PT - Next Appointment 08/16/23  -AM      PT Goal Re-Cert Due Date 08/29/23  -AM                User Key  (r) = Recorded By, (t) = Taken By, (c) = Cosigned By      Initials Name Provider Type    AM Roland Amaro PT Physical Therapist                  Therapy Charges for Today       Code Description Service Date Service Provider Modifiers Qty    77283604463 HC PT EVAL MOD COMPLEXITY 3 8/15/2023 Roland Amaro, PT GP 1            PT G-Codes  Outcome Measure Options: AM-PAC 6 Clicks Basic Mobility (PT)  AM-PAC 6 Clicks Score (PT): 17  PT Discharge Summary  Anticipated Discharge Disposition (PT): skilled nursing facility    Roland Amaro PT  8/15/2023    "

## 2023-08-15 NOTE — CONSULTS
"Diabetes Education  Assessment/Teaching    Patient Name:  Simran Wall  YOB: 1970  MRN: [id number]  Admit Date:  8/11/2023      Assessment Date:  8/15/2023  Flowsheet Row Most Recent Value   General Information     Referral From: --  [Pt seen due to diabetes on problem list. Last A1c in [facility] was from 8/1/2023 and result was 9.6%. Adm bs 182.]   Height 157.5 cm (62\")   Height Method Stated   Weight 96.2 kg (212 lb 1.3 oz)   Weight Method Bed scale   Pregnancy Assessment    Diabetes History    What type of diabetes do you have? Type 2   Length of Diabetes Diagnosis --  [dx in 2011]   Have you had diabetes education/teaching in the past? yes   When and where was your diabetes education? Was seen by inpatient diabetes educator at [facility] last on 8/2/2023   Do you test your blood sugar at home? yes   Frequency of checks not routinely   Meter type One Touch meter, new meter   Who performs the test? self   Have you had low blood sugar? (<70mg/dl) no   Education Preferences    Nutrition Information    Assessment Topics    DM Goals             Flowsheet Row Most Recent Value   DM Education Needs    Meter Has own   Meter Type One Touch   Frequency of Testing Daily  [Discussed importance of checking bs daily and rotating the times when she checks. Discussed importance of reporting bs to PCP if bs running outside healthy range.]   Blood Glucose Target Range Reviewed previous A1c result of 9.6% on 8/1/2023. Reviewed healthy bs range and healthy A1c target. Discussed importance of bs control.   Medication Oral  [Pt states has Metformin at home but has not been taking as prescribed. Discussed importance of taking medication as prescribed. Pt states she will begin taking medication.]   Problem Solving Hyperglycemia, Signs, Symptoms, Treatment   Reducing Risks A1C testing  [Gave A1c info sheet.]   Healthy Eating --  [Pt states may only eat 1 meal/day. Pt drinking regular soft drinks. She states will have cans or bottles " (not always the same oz) and will have about 2 per day. Discussed importance of reducing consumption of sugared beverages.]   Motivation Moderate   Teaching Method Explanation, Discussion, Handouts   Patient Response Verbalized understanding              Other Comments:  Met with pt at bedside. Pt states has PCP. Pt states has not seen PCP for over a year. Discussed importance of follow up with PCP. Pt states has diabetes medication and bs meter and supplies and not needing additional education at this time.       Electronically signed by:  Theresa Rod RN  08/15/23 15:03 EDT

## 2023-08-15 NOTE — CASE MANAGEMENT/SOCIAL WORK
Discharge Planning Assessment  [facility]     Patient Name: Simran Wall  MRN: [id number]  Today's Date: 8/15/2023    Admit Date: 8/11/2023    Plan: From home alone. Referral to [facility] pending. Will require precert and PASRR.   Discharge Needs Assessment       Row Name 08/15/23 1157       Living Environment    People in Home alone    Current Living Arrangements home    Potentially Unsafe Housing Conditions none    Primary Care Provided by self    Provides Primary Care For no one    Family Caregiver if Needed child(sg), adult    Family Caregiver Names DaughterAlejandro    Quality of Family Relationships helpful;supportive    Able to Return to Prior Arrangements yes       Resource/Environmental Concerns    Resource/Environmental Concerns none    Transportation Concerns none       Transition Planning    Patient/Family Anticipated Services at Transition skilled nursing    Transportation Anticipated family or friend will provide       Discharge Needs Assessment    Readmission Within the Last 30 Days no previous admission in last 30 days    Equipment Currently Used at Home glucometer;ramp;wheelchair;cane, straight;walker, rolling;shower chair    Concerns to be Addressed discharge planning;care coordination/care conferences    Anticipated Changes Related to Illness none    Equipment Needed After Discharge none    Outpatient/Agency/Support Group Needs skilled nursing facility    Discharge Facility/Level of Care Needs nursing facility, skilled    Provided Post Acute Provider List? Yes    Post Acute Provider List Nursing Home    Delivered To Patient    Method of Delivery In person                   Discharge Plan       Row Name 08/15/23 1208       Plan    Plan From home alone. Referral to [facility] pending. Will require precert and PASRR.    Patient/Family in Agreement with Plan yes    Plan Comments CM met with patient at bedside and was able to complete CM assessment. Pt states she lives at home alone,  drives (but does not have a vehicle), and is independent with ADL's. PCP confirmed but pt says she has not seen in a while. Confirmed pharmacy and enrolled pt in [facility]. Pt does not use DME routinely but has available: canes, walkers, w/c, glucometer, grab bars, and ramp. Pt reports her daughter Kyra lives in [place] and is a support person for her. Discussed transportation means at discharge and will likely utilize pt's Medicaid. PT evaluated pt and did feel pt could benefit from SNF placement. Pt is agreeable and reviewed SNF list; her choices are 1) [facility], 2) [facility], and 3) [facility] RE[facility] notified of need for PASRR.                  Continued Care and Services - Admitted Since 8/11/2023       Destination       Service Provider Request Status Selected Services Address Phone Fax Patient Preferred    [facility] Pending - Request Sent N/A [address] [place] [address] [phone]                   Expected Discharge Date and Time       Expected Discharge Date Expected Discharge Time    Aug 15, 2023            Demographic Summary       Row Name 08/15/23 1156       General Information    Admission Type observation    Arrived From emergency department    Referral Source admission list    Reason for Consult care coordination/care conference;discharge planning    Preferred Language English       Contact Information    Permission Granted to Share Info With                    Functional Status       Row Name 08/15/23 1156       Functional Status    Usual Activity Tolerance moderate    Current Activity Tolerance moderate       Functional Status, IADL    Medications independent    Meal Preparation independent    Housekeeping independent    Laundry independent    Shopping independent             Megan Naegele, RN     Office Phone: [phone]  Office Cell: [phone]

## 2023-08-15 NOTE — CASE MANAGEMENT/SOCIAL WORK
Continued Stay Note  [facility]     Patient Name: Simran Wall  MRN: [id number]  Today's Date: 8/15/2023    Admit Date: 8/11/2023    Plan: Return home alone. Declines SNF.   Discharge Plan       Row Name 08/15/23 1744       Plan    Plan Comments [facility] attempted to call but waa placed on hold greater than 20 min then disconnected. Called [facility] van and asked if they can get patient and stated that they can but  is currently on another run patient to wait at  nurses station for  as she has been discharged from room for a couple hours.                      Koki Perrin RN

## 2023-08-15 NOTE — DISCHARGE PLACEMENT REQUEST
"Simran Wall (53 y.o. Female)       Date of Birth   1970    Social Security Number       Address   [address] [place] [address] [place] [address]    Home Phone   [phone]    MRN   [id number]       Yazidism   None    Marital Status                               Admission Date   8/11/23    Admission Type   Emergency    Admitting Provider   Asher Oneil MD    Attending Provider   Jeremiah Saldana MD    Department, Room/Bed   [facility] 3C MEDICAL INPATIENT, 362/1       Discharge Date       Discharge Disposition   Home or Self Care    Discharge Destination                                 Attending Provider: Jeremiah Saldana MD    Allergies: Cephalexin, Ibuprofen    Isolation: None   Infection: None   Code Status: CPR    Ht: 157.5 cm (62\")   Wt: 96.2 kg (212 lb 1.3 oz)    Admission Cmt: None   Principal Problem: UTI (urinary tract infection) [N39.0]                   Active Insurance as of 8/11/2023       Primary Coverage       Payor Plan Insurance Group Employer/Plan Group    HUMANA MEDICARE REPLACEMENT HUMANA MEDICARE REPLACEMENT [id number]       Payor Plan Address Payor Plan Phone Number Payor Plan Fax Number Effective Dates    [address] [address] [phone]  3/1/2023 - None Entered    [facility] [address]         Subscriber Name Subscriber Birth Date Member ID       SIMRAN WALL 1970 [id number]               Secondary Coverage       Payor Plan Insurance Group Employer/Plan Group    [place]        Payor Plan Address Payor Plan Phone Number Payor Plan Fax Number Effective Dates    [address]   5/24/2021 - None Entered    [place] [address]         Subscriber Name Subscriber Birth Date Member ID       SIMRAN WALL 1970 [id number]                     Emergency Contacts        (Rel.) Home Phone Work Phone Mobile Phone    WOODROW WALL (Daughter) -- -- [phone]    MAYRACLARITZA (Son) -- -- [phone]                 Discharge Summaryl "        Naegele, Megan, RN at 08/15/23 1429          Continued Stay Note  [facility]     Patient Name: Simran Wall  MRN: [id number]  Today's Date: 8/15/2023    Admit Date: 8/11/2023    Plan: Return home alone. Declines SNF.   Discharge Plan       Row Name 08/15/23 1423       Plan    Plan Return home alone. Declines SNF.    Patient/Family in Agreement with Plan yes    Plan Comments CM received notification from [facility] liaison Leigha that they will not be able to accept pt. Received phone call from patient who now reports she wants to go home. CM updated hospitalist and nursing by secure chat. CM contacted patient's PCP office, Dr Martino and scheduled hospital f/u appt 8/25. Added insurance transportation phone numbers to AVS along with PCP appt. CM setting up hospital discharge transportation via [employer]. Spoke to SINDY, trip ID #[id number].        Megan Naegele, RN     Office Phone: [phone]  Office Cell: [phone]      Electronically signed by Naegele, Megan, RN at 08/15/23 1430       Gaby Luke, RN at 08/15/23 1427            Problem: Adult Inpatient Plan of Care  Goal: Plan of Care Review  Outcome: Adequate for Care Transition  Flowsheets (Taken 8/15/2023 1427)  Progress: improving  Plan of Care Reviewed With: patient  Goal: Patient-Specific Goal (Individualized)  Outcome: Adequate for Care Transition  Goal: Absence of Hospital-Acquired Illness or Injury  Outcome: Adequate for Care Transition  Intervention: Identify and Manage Fall Risk  Recent Flowsheet Documentation  Taken 8/15/2023 1205 by Gaby Luke, RN  Safety Promotion/Fall Prevention:   assistive device/personal items within reach   clutter free environment maintained   fall prevention program maintained   nonskid shoes/slippers when out of bed   room organization consistent   safety round/check completed  Taken 8/15/2023 1039 by Gaby Luke, RN  Safety Promotion/Fall Prevention:   assistive  device/personal items within reach   clutter free environment maintained   nonskid shoes/slippers when out of bed   room organization consistent   safety round/check completed   fall prevention program maintained  Taken 8/15/2023 0845 by Gaby Luke RN  Safety Promotion/Fall Prevention:   assistive device/personal items within reach   clutter free environment maintained   fall prevention program maintained   nonskid shoes/slippers when out of bed   room organization consistent   safety round/check completed  Intervention: Prevent Skin Injury  Recent Flowsheet Documentation  Taken 8/15/2023 0845 by Gaby Luke RN  Body Position:   side-lying   head facing, right  Intervention: Prevent and Manage VTE (Venous Thromboembolism) Risk  Recent Flowsheet Documentation  Taken 8/15/2023 1205 by Gaby Luke RN  Activity Management: ambulated in room  Taken 8/15/2023 0845 by Gaby Luke RN  Activity Management: activity encouraged  VTE Prevention/Management: sequential compression devices off  Range of Motion: active ROM (range of motion) encouraged  Intervention: Prevent Infection  Recent Flowsheet Documentation  Taken 8/15/2023 1205 by Gaby Luke RN  Infection Prevention:   environmental surveillance performed   hand hygiene promoted   single patient room provided  Taken 8/15/2023 1039 by Gaby Luke RN  Infection Prevention:   environmental surveillance performed   hand hygiene promoted   rest/sleep promoted   single patient room provided  Taken 8/15/2023 0845 by Gaby Luke RN  Infection Prevention:   environmental surveillance performed   hand hygiene promoted   single patient room provided  Goal: Optimal Comfort and Wellbeing  Outcome: Adequate for Care Transition  Intervention: Monitor Pain and Promote Comfort  Recent Flowsheet Documentation  Taken 8/15/2023 0845 by Gaby Luke RN  Pain Management Interventions:   care clustered   diversional activity provided   breathing  exercises  Intervention: Provide Person-Centered Care  Recent Flowsheet Documentation  Taken 8/15/2023 0845 by Gaby Luke RN  Trust Relationship/Rapport:   care explained   choices provided  Goal: Readiness for Transition of Care  Outcome: Adequate for Care Transition     Problem: Behavioral Health Comorbidity  Goal: Maintenance of Behavioral Health Symptom Control  Outcome: Adequate for Care Transition  Intervention: Maintain Behavioral Health Symptom Control  Recent Flowsheet Documentation  Taken 8/15/2023 1205 by Gaby Luke RN  Medication Review/Management:   medications reviewed   high-risk medications identified  Taken 8/15/2023 0845 by Gaby Luke RN  Medication Review/Management:   medications reviewed   high-risk medications identified     Problem: Diabetes Comorbidity  Goal: Blood Glucose Level Within Targeted Range  Outcome: Adequate for Care Transition  Intervention: Monitor and Manage Glycemia  Recent Flowsheet Documentation  Taken 8/15/2023 0845 by Gaby Luke RN  Glycemic Management: blood glucose monitored     Problem: Hypertension Comorbidity  Goal: Blood Pressure in Desired Range  Outcome: Adequate for Care Transition  Intervention: Maintain Blood Pressure Management  Recent Flowsheet Documentation  Taken 8/15/2023 1205 by Gaby Luke RN  Medication Review/Management:   medications reviewed   high-risk medications identified  Taken 8/15/2023 0845 by Gaby Luke RN  Medication Review/Management:   medications reviewed   high-risk medications identified     Problem: Skin Injury Risk Increased  Goal: Skin Health and Integrity  Outcome: Adequate for Care Transition  Intervention: Optimize Skin Protection  Recent Flowsheet Documentation  Taken 8/15/2023 0845 by Gaby Luke RN  Head of Bed (HOB) Positioning: HOB at 30-45 degrees     Problem: Fall Injury Risk  Goal: Absence of Fall and Fall-Related Injury  Outcome: Adequate for Care Transition  Intervention: Identify and  Manage Contributors  Recent Flowsheet Documentation  Taken 8/15/2023 1205 by Gaby Luke RN  Medication Review/Management:   medications reviewed   high-risk medications identified  Taken 8/15/2023 0845 by Gaby Luke RN  Medication Review/Management:   medications reviewed   high-risk medications identified  Intervention: Promote Injury-Free Environment  Recent Flowsheet Documentation  Taken 8/15/2023 1205 by Gaby Luke RN  Safety Promotion/Fall Prevention:   assistive device/personal items within reach   clutter free environment maintained   fall prevention program maintained   nonskid shoes/slippers when out of bed   room organization consistent   safety round/check completed  Taken 8/15/2023 1039 by Gaby Luke RN  Safety Promotion/Fall Prevention:   assistive device/personal items within reach   clutter free environment maintained   nonskid shoes/slippers when out of bed   room organization consistent   safety round/check completed   fall prevention program maintained  Taken 8/15/2023 0845 by Gaby Luke RN  Safety Promotion/Fall Prevention:   assistive device/personal items within reach   clutter free environment maintained   fall prevention program maintained   nonskid shoes/slippers when out of bed   room organization consistent   safety round/check completed   Goal Outcome Evaluation:  Plan of Care Reviewed With: patient        Progress: improving     Alert and oriented x 4. Able to verbalize needs and wants. Takes medication whole and tolerates well. Independent for transfers/ambulation. Does not require O2 therapy at this time. Declined going to SNF, will discharge to home. Transportation to be set up through insurance company. No s/s of hyper/hypoglycemia noted. Currently in bed, awake. Dressed and ready to discharge.          Electronically signed by Gaby Luke RN at 08/15/23 1434       Angelica Triplett APRN at 08/15/23 1404            Chief complaint anxiety  "    Subjective .     History of present illness:  Simran Wall is a 53 y.o. female with a past medical history of mental illness, drug use (Meth), HTN, and Hypothyroidism who presented to [facility] on 8/11/2023 stating to the ED triage \"I am scared\".  Pt refused to speak to staff, when staff attempted to speak with the pt, she would close her eyes and turn away from the person talking.      Psychiatry consulted due to pt refusal to speak.  On chart review, patient was discharged from [facility] on 8/2/23 after being treated for meth induced delirium. Pt at that time exhibiting bizarre behavior, refused CD trt and was cleared for d/c.  Pt knew she was being treated for a UTI and reported feeling anxious.  Pt hesitant to answer interview questions and guarded.   Pt appeared restless tapping foot during interview, poor eye contact.  Pt denied SI/HI/AVH  Pt denied symptoms of psychosis, gerardo, hypomania.  Pt denied feeling sad, or hopeless.  Pt denied substance use, though tox screen positive for methamphetamine.  Pt has poor insight into health, and non-compliant with care.  Pt reports she is homeless at this time but has family she can stay with if she needs to.   Pt endorses symptoms of anxiety, pt reports feeling nervous, she wants to leave hospital.     8/15/23: I saw this patient this morning and she again refused to talk with me. She asked me to come back in the afternoon. I saw her this afternoon, and she was more cooperative and did talk with me. She was excited to be going home to her two dogs.     Review of Systems   All systems were reviewed and negative except for:  Behavioral/Psych: positive for  anhedonia and anxiety    The following portions of the patient's history were reviewed and updated as appropriate: allergies, current medications, past family history, past medical history, past social history, past surgical history and problem list.    History    Past psychiatric " "history\       Medications Prior to Admission   Medication Sig Dispense Refill Last Dose    ARIPiprazole (ABILIFY) 5 MG tablet Take 1 tablet by mouth Every Night. (Patient not taking: Reported on 8/12/2023) 30 tablet 0 Not Taking    levothyroxine (SYNTHROID, LEVOTHROID) 50 MCG tablet Take 1 tablet by mouth Every Morning. 30 tablet 0 Not Taking    lisinopril (PRINIVIL,ZESTRIL) 5 MG tablet Take 1 tablet by mouth Daily. 30 tablet 0 Not Taking        Scheduled Meds:  ARIPiprazole, 5 mg, Oral, Nightly  busPIRone, 5 mg, Oral, TID  enoxaparin, 40 mg, Subcutaneous, Q24H  levothyroxine, 50 mcg, Oral, Q AM  lisinopril, 5 mg, Oral, Q24H  sodium chloride, 10 mL, Intravenous, Q12H         Continuous Infusions:       PRN Meds:    acetaminophen **OR** acetaminophen **OR** acetaminophen    senna-docusate sodium **AND** polyethylene glycol **AND** bisacodyl **AND** bisacodyl    melatonin    ondansetron **OR** ondansetron    [COMPLETED] Insert Peripheral IV **AND** sodium chloride    sodium chloride    sodium chloride      Allergies:  Cephalexin and Ibuprofen      Objective     Vital Signs   /79 (BP Location: Right arm, Patient Position: Lying)   Pulse 78   Temp 98.3 øF (36.8 øC) (Oral)   Resp 17   Ht 157.5 cm (62\")   Wt 96.2 kg (212 lb 1.3 oz)   SpO2 96%   BMI 38.79 kg/mý     Physical Exam:    Musculoskeletal:   Muscle strength and tone: moderate, equal bilaterally.   Abnormal Movements: None noted.   Gait: VEGA, patient in bed.      General Appearance:    Resting in bed, in NAD.      Mental Status Exam:   Hygiene:   good  Cooperation:  Guarded  Eye Contact:  Poor  Psychomotor Behavior:  Slow  Affect:  Restricted  Mood: depressed  Hopelessness: Denies  Speech:  Minimal  Thought Process:  Goal directed and Linear  Thought Content:  Normal and Mood congruent  Suicidal:  None  Homicidal:  None  Hallucinations:  None  Delusion:  None  Memory:  Intact  Orientation:  Person, Place, Time, and Situation  Reliability:  " poor  Insight:  Poor  Judgement:  Poor  Impulse Control:  Impaired     MSE from 8/14/23 reviewed and accepted with changes.     Medications and allergies reviewed.    Result Review:  I have personally reviewed the results from the time of this admission to 8/15/2023 14:04 EDT and agree with these findings:  [x]  Laboratory  []  Microbiology  []  Radiology  [x]  EKG/Telemetry   []  Cardiology/Vascular   []  Pathology  []  Old records  []  Other:      Assessment & Plan       UTI (urinary tract infection)    Chronic mental illness    Drug abuse       Assessment: methamphetamine abuse, anxiety   Treatment Plan: Psychiatry consulted due to pt refusing to speak to staff. On today's assessment, patient was more cooperative and upbeat. She was excited to be going home later today.     Previous provider started abilify and buspirone. Those medications can be continued.     Patient would benefit from substance use treatment if agreeable based on history and positive uds.    Pt would benefit from [facility] resources for outpatient psychiatric care if agreeable.      Continue Abilify 5 mg nightly     Continue Buspar 5 mg tid for anxiety      Will follow PRN.   Treatment Plan discussed with: Patient    I discussed the patients findings and my recommendations with     I have reviewed and approved the behavioral health treatment plans and problem list. Yes     Referring MD has access to consult report and progress notes in EMR     CRISTIANA Kaiser  08/15/23  14:04 EDT              Electronically signed by Angelica Triplett APRN at 08/15/23 1406       Naegele, Megan, RN at 08/15/23 3202          [place] Medicaid Transportation: [facility] Transportation: [phone], Monday through Friday, 8a.m.-8p.m.  [employer] Transportation: [employer]: [phone].      Electronically signed by Naegele, Megan, RN at 08/15/23 1344       Naegele, Megan, RN at 08/15/23 1214          Discharge Planning Assessment  JAQUELINE Salmon      Patient Name: Simran Wall  MRN: [id number]  Today's Date: 8/15/2023    Admit Date: 8/11/2023    Plan: From home alone. Referral to [facility] pending. Will require precert and PASRR.   Discharge Needs Assessment       Row Name 08/15/23 1157       Living Environment    People in Home alone    Current Living Arrangements home    Potentially Unsafe Housing Conditions none    Primary Care Provided by self    Provides Primary Care For no one    Family Caregiver if Needed child(sg), adult    Family Caregiver Names Daughter-Kyra    Quality of Family Relationships helpful;supportive    Able to Return to Prior Arrangements yes       Resource/Environmental Concerns    Resource/Environmental Concerns none    Transportation Concerns none       Transition Planning    Patient/Family Anticipated Services at Transition skilled nursing    Transportation Anticipated family or friend will provide       Discharge Needs Assessment    Readmission Within the Last 30 Days no previous admission in last 30 days    Equipment Currently Used at Home glucometer;ramp;wheelchair;cane, straight;walker, rolling;shower chair    Concerns to be Addressed discharge planning;care coordination/care conferences    Anticipated Changes Related to Illness none    Equipment Needed After Discharge none    Outpatient/Agency/Support Group Needs skilled nursing facility    Discharge Facility/Level of Care Needs nursing facility, skilled    Provided Post Acute Provider List? Yes    Post Acute Provider List Nursing Home    Delivered To Patient    Method of Delivery In person                   Discharge Plan       Row Name 08/15/23 1208       Plan    Plan From home alone. Referral to [facility] pending. Will require precert and PASRR.    Patient/Family in Agreement with Plan yes    Plan Comments CM met with patient at bedside and was able to complete CM assessment. Pt states she lives at home alone, drives (but does not have a vehicle), and is  independent with ADL's. PCP confirmed but pt says she has not seen in a while. Confirmed pharmacy and enrolled pt in [facility]. Pt does not use DME routinely but has available: canes, walkers, w/c, glucometer, grab bars, and ramp. Pt reports her daughter Kyra lives in [place] and is a support person for her. Discussed transportation means at discharge and will likely utilize pt's Medicaid. PT evaluated pt and did feel pt could benefit from SNF placement. Pt is agreeable and reviewed SNF list; her choices are 1) [facility], 2) [facility], and 3) [facility] notified of need for PASRR.                  Continued Care and Services - Admitted Since 8/11/2023       Destination       Service Provider Request Status Selected Services Address Phone Fax Patient Preferred    [facility] Pending - Request Sent N/A [id number] [facility] [place] [address] [phone]                   Expected Discharge Date and Time       Expected Discharge Date Expected Discharge Time    Aug 15, 2023            Demographic Summary       Row Name 08/15/23 1156       General Information    Admission Type observation    Arrived From emergency department    Referral Source admission list    Reason for Consult care coordination/care conference;discharge planning    Preferred Language English       Contact Information    Permission Granted to Share Info With                    Functional Status       Row Name 08/15/23 1156       Functional Status    Usual Activity Tolerance moderate    Current Activity Tolerance moderate       Functional Status, IADL    Medications independent    Meal Preparation independent    Housekeeping independent    Laundry independent    Shopping independent             Megan Naegele, RN     Office Phone: [phone]  Office Cell: [phone]       Electronically signed by Naegele, Megan, RN at  "08/15/23 1215       Naegele, Megan, RN at 08/15/23 1206            Simran Yao (53 y.o. Female)       Date of Birth   1970    Social Security Number       Address   [address] [place] [address] [place] [address]    Home Phone   [phone]    MRN   [id number]       Hoahaoism   None    Marital Status                               Admission Date   8/11/23    Admission Type   Emergency    Admitting Provider   Asher Oneil MD    Attending Provider   Jeremiah Saldana MD    Department, Room/Bed   [facility] 3C MEDICAL INPATIENT, 362/1       Discharge Date       Discharge Disposition       Discharge Destination                                 Attending Provider: Jeremiah Saldana MD    Allergies: Cephalexin, Ibuprofen    Isolation: None   Infection: None   Code Status: CPR    Ht: 157.5 cm (62\")   Wt: 96.2 kg (212 lb 1.3 oz)    Admission Cmt: None   Principal Problem: UTI (urinary tract infection) [N39.0]                   Active Insurance as of 8/11/2023       Primary Coverage       Payor Plan Insurance Group Employer/Plan Group    HUMANA MEDICARE REPLACEMENT HUMANA MEDICARE REPLACEMENT [id number]       Payor Plan Address Payor Plan Phone Number Payor Plan Fax Number Effective Dates    [address] [address] [phone]  3/1/2023 - None Entered    [facility] [address]         Subscriber Name Subscriber Birth Date Member ID       SIMRAN YAO 1970 [id number]               Secondary Coverage       Payor Plan Insurance Group Employer/Plan Group    [place]        Payor Plan Address Payor Plan Phone Number Payor Plan Fax Number Effective Dates    [address]   5/24/2021 - None Entered    [place] [address]         Subscriber Name Subscriber Birth Date Member ID       SIMARN YAO 1970 [id number]                     Emergency Contacts        (Rel.) Home Phone Work Phone Mobile Phone    CLARITZA NUNEZ (Son) -- -- [phone]    KRISTIAN YAO (Daughter) -- " -- [phone]            Electronically signed by Naegele, Megan, RN at 08/15/23 1206       Roland Amaro, PT at 08/15/23 1126          Patient Name: Simran Wall  : 1970    MRN: [id number]                              Today's Date: 8/15/2023       Admit Date: 2023    Visit Dx:     ICD-10-CM ICD-9-CM   1. Amphetamine use  F15.90 305.70   2. Altered mental status, unspecified altered mental status type  R41.82 780.97     Patient Active Problem List   Diagnosis    Type 2 diabetes mellitus    Hypothyroidism    Hypertension    Mental status alteration    Delirium    UTI (urinary tract infection)    Chronic mental illness    Drug abuse     Past Medical History:   Diagnosis Date    Diabetes mellitus     Hypertension      History reviewed. No pertinent surgical history.   General Information       Row Name 08/15/23 1035          Physical Therapy Time and Intention    Document Type evaluation  -AM     Mode of Treatment physical therapy  -AM       Row Name 08/15/23 103          General Information    Patient Profile Reviewed yes  -AM     Prior Level of Function independent:;gait;transfer;bed mobility;using stairs  -AM     Existing Precautions/Restrictions fall  -AM     Barriers to Rehab none identified  -AM       Row Name 08/15/23 1035          Living Environment    People in Home alone  -AM       Row Name 08/15/23 1035          Home Main Entrance    Number of Stairs, Main Entrance three  -AM     Stair Railings, Main Entrance railings safe and in good condition  -AM       Row Name 08/15/23 1035          Stairs Within Home, Primary    Number of Stairs, Within Home, Primary none  -AM       Row Name 08/15/23 1035          Cognition    Orientation Status (Cognition) oriented to;person;time;disoriented to;place  -AM       Row Name 08/15/23 1035          Safety Issues, Functional Mobility    Impairments Affecting Function (Mobility) balance;endurance/activity tolerance;pain;strength  -AM     Comment, Safety  Issues/Impairments (Mobility) gait belt utilized  -AM               User Key  (r) = Recorded By, (t) = Taken By, (c) = Cosigned By      Initials Name Provider Type    AM Roland Amaro, PT Physical Therapist                   Mobility       Row Name 08/15/23 1036          Bed Mobility    Bed Mobility supine-sit  -AM     Supine-Sit [place] (Bed Mobility) supervision  -AM     Assistive Device (Bed Mobility) bed rails;head of bed elevated  -AM       Row Name 08/15/23 1036          Sit-Stand Transfer    Sit-Stand [place] (Transfers) contact guard;1 person assist  -AM       Row Name 08/15/23 1036          Gait/Stairs (Locomotion)    [place]ott Level (Gait) minimum assist (75% patient effort);1 person assist  -AM     Distance in Feet (Gait) 10'  -AM     Comment, (Gait/Stairs) decreased balance, flexed trunk, decreased gait speed, decreased endurance  -AM               User Key  (r) = Recorded By, (t) = Taken By, (c) = Cosigned By      Initials Name Provider Type    AM Roland Amaro, PT Physical Therapist                   Obj/Interventions       Row Name 08/15/23 1037          Range of Motion Comprehensive    General Range of Motion no range of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 Maintenance of Behavioral Health Symptom Control  Outcome: Ongoing, Progressing     Problem: Diabetes Comorbidity  Goal: Blood Glucose Level Within Targeted Range  Outcome: Ongoing, Progressing     Problem: Hypertension Comorbidity  Goal: Blood Pressure in Desired Range  Outcome: Ongoing, Progressing   Goal Outcome Evaluation:      Pt resting bed states \" just leave me alone and close my door\". Daughter called with update per pt request.  consult called. Pt states she is not safe at home                    Electronically signed by Sanaz Corey RN at 23 1612       Jeremiah Saldana MD at 23 1538           [facility]     Progress Note    Patient Name: Simran Wall  : 1970  MRN: [id number]  Primary Care Physician:  John Martino MD  Date of admission: 2023  Service date and time: 23 15:38 EDT  Subjective   Subjective     Chief Complaint: anxiety    HPI:  Patient feels better today , less anxious      Objective   Objective     Vitals:   Temp:  [97.7 øF (36.5 øC)-98.4 øF (36.9 øC)] 98.4 øF (36.9 øC)  Heart Rate:  [63-74] 74  Resp:  [16-18] 16  BP: (106-112)/(56-72) 106/56  Physical Exam    Constitutional: Awake, alert in no distress    Eyes: PERRLA, sclerae anicteric, no conjunctival injection   HENT: NCAT, mucous membranes moist   Neck: Supple, no thyromegaly, no lymphadenopathy, trachea midline   Respiratory: Clear to auscultation bilaterally, nonlabored respirations    Cardiovascular: RRR, no murmurs, rubs, or gallops, palpable pedal pulses bilaterally   Gastrointestinal: Positive bowel sounds, soft, nontender, nondistended   Musculoskeletal: No bilateral ankle edema, no clubbing or cyanosis to extremities   Psychiatric: Appropriate affect, cooperative   Neurologic: Oriented x 3, strength symmetric in all extremities, Cranial Nerves grossly intact to confrontation, speech clear   Skin: No " gopi     Result Review    Result Review:  I have personally reviewed the results from the time of this admission to 8/13/2023 15:38 EDT and agree with these findings:  [x]  Laboratory list / accordion  []  Microbiology  []  Radiology  []  EKG/Telemetry   []  Cardiology/Vascular   []  Pathology  []  Old records  []  Other:  Most notable findings include: urine toxicology positive for amphetamines/methamphetamine      Assessment & Plan   Assessment / Plan       Active Hospital Problems:  Active Hospital Problems    Diagnosis     **UTI (urinary tract infection)     Drug abuse     Chronic mental illness      Plan:   Acute cystitis without hematuria  -UA reviewed, squamous epithelial cells noted, 4+ bacteria  -Patient refuses to answer when asked about possible UTI symptoms including dysuria, frequency, urgency and abdominal pain  Received 1 dose of antibiotics   Urine culture showed no growth     Chronic Mental Illness  Drug Abuse  -CT of the head reviewed  -Tox screen positive for meth  -Continue Abilify  -Psychiatry consulted   -was started on Buspar      Hypothyroidism  -Continue levothyroxine     Essential Hypertension  -Controlled  -Monitor BP  -Continue home Lisinopril    DVT prophylaxis:  Mechanical DVT prophylaxis orders are present.    CODE STATUS:   Code Status (Patient has no pulse and is not breathing): CPR (Attempt to Resuscitate)  Medical Interventions (Patient has pulse or is breathing): Full Support    Disposition:  I expect patient to be discharged in 2 days.    Jeremiah Saldana MD    Electronically signed by Jeremiah Saldana MD at 08/13/23 1543       Reva Shea APRN at 08/13/23 1500       Attestation signed by Chikis Ny MD at 08/15/23 0906    I have reviewed the mid-level documentation, and agree with the documentation, medical decision making and treatment plan as outlined by the mid-level provider.    This document has been electronically signed by Chikis Ny MD  August 15, 2023  "09:06 EDT                                                                 Referring Provider: Bárbara Silver APRN   Reason for Consultation: Refusal to speak      Chief complaint : Anxiety     Subjective .     History of present illness:  Simran Wall is a 53 y.o. female with a past medical history of mental illness, drug use (Meth), HTN, and Hypothyroidism who presented to [facility] on 8/11/2023 stating to the ED triage \"I am scared\".  Pt refused to speak to staff, when staff attempted to speak with the pt, she would close her eyes and turn away from the person talking.      Psychiatry consulted due to pt refusal to speak.  On chart review, patient was discharged from [facility] on 8/2/23 after being treated for meth induced delirium. Pt at that time exhibiting bizarre behavior, refused CD trt and was cleared for d/c.  Pt knew she was being treated for a UTI and reported feeling anxious.  Pt hesitant to answer interview questions and guarded.   Pt appeared restless tapping foot during interview, poor eye contact.  Pt denied SI/HI/AVH  Pt denied symptoms of psychosis, gerardo, hypomania.  Pt denied feeling sad, or hopeless.  Pt denied substance use, though tox screen positive for methamphetamine.  Pt has poor insight into health, and non-compliant with care.  Pt reports she is homeless at this time but has family she can stay with if she needs to.   Pt endorses symptoms of anxiety, pt reports feeling nervous, she wants to leave hospital.     Interview limited due to pt cooperation.  Pt did report that she felt safe today.  No family at bedside.       Today pt resting in bed, somewhat more cooperative with interview.  Pt reports she feels confused, overwhelmed.   Pt mostly communicated with hand gestures, delayed responses.   Pt knew she is in hospital, knows year and month.  Pt reports she has been having financial disagreements with her brother, whom she is fearful of.  Pt reports having " children but does not consent for this provider to speak with them, no pressing or urgent need to contact children at this time.   Per nursing pt is cooperative with medications   Buspar and Abilify started yesterday.  Pt continues to deny substance use, discussed uds positive for methamphetamine, pt denied using.  Pt lives at home alone, previously told she was homeless.  Reports having some friends/support.   Denies symptoms of depression, psychosis, gerardo, hypomania.  Pt endorses symptoms of anxiety, pt reports feeling nervous, overwhelmed, restless    Denies SI/HI/AVH    Review of Systems   All systems were reviewed and negative except for:  Behavioral/Psych: positive for  anxiety    The following portions of the patient's history were reviewed and updated as appropriate: allergies, current medications, past family history, past medical history, past social history, past surgical history and problem list.    History    Past psychiatric history     Past Medical History:   Diagnosis Date    Diabetes mellitus     Hypertension         History reviewed. No pertinent family history.     Social History     Tobacco Use    Smoking status: Never     Passive exposure: Never    Smokeless tobacco: Never   Vaping Use    Vaping Use: Never used   Substance Use Topics    Alcohol use: Never    Drug use: Yes     Types: Methamphetamines     Comment: Pt states no drug use, tox screen positive amphetamines          Medications Prior to Admission   Medication Sig Dispense Refill Last Dose    ARIPiprazole (ABILIFY) 5 MG tablet Take 1 tablet by mouth Every Night. (Patient not taking: Reported on 8/12/2023) 30 tablet 0 Not Taking    levothyroxine (SYNTHROID, LEVOTHROID) 50 MCG tablet Take 1 tablet by mouth Every Morning. (Patient not taking: Reported on 8/12/2023) 30 tablet 0 Not Taking    lisinopril (PRINIVIL,ZESTRIL) 5 MG tablet Take 1 tablet by mouth Daily. (Patient not taking: Reported on 8/12/2023) 30 tablet 0 Not Taking         "Scheduled Meds:  ARIPiprazole, 5 mg, Oral, Nightly  busPIRone, 5 mg, Oral, TID  levothyroxine, 50 mcg, Oral, Q AM  lisinopril, 5 mg, Oral, Q24H  sodium chloride, 10 mL, Intravenous, Q12H         Continuous Infusions:       PRN Meds:    acetaminophen **OR** acetaminophen **OR** acetaminophen    senna-docusate sodium **AND** polyethylene glycol **AND** bisacodyl **AND** bisacodyl    melatonin    ondansetron **OR** ondansetron    [COMPLETED] Insert Peripheral IV **AND** sodium chloride    sodium chloride    sodium chloride      Allergies:  Cephalexin and Ibuprofen      Objective     Vital Signs   /56 (BP Location: Right arm, Patient Position: Lying)   Pulse 74   Temp 98.4 øF (36.9 øC) (Oral)   Resp 16   Ht 157.5 cm (62\")   Wt 96.2 kg (212 lb 1.3 oz)   SpO2 96%   BMI 38.79 kg/mý     Physical Exam:    Muscle strength and tone: moderate, ue's, equal bilaterally  Abnormal Movements: None observed   Gait: VEGA     General Appearance:    Resting in bed        Mental Status Exam:   Hygiene:   good  Cooperation:  Guarded  Eye Contact:  Poor  Psychomotor Behavior:  Slow  Affect:  Restricted  Mood: anxious  Hopelessness: Denies  Speech:  Minimal  Thought Process:  Goal directed  Thought Content:  Mood congruent  Suicidal:  None  Homicidal:  None  Hallucinations:  None  Delusion:  None  Memory:  Intact  Orientation:  Person, Place, Time, and Situation  Reliability:  poor  Insight:  Poor  Judgement:  Impaired  Impulse Control:  Impaired        Medications and allergies reviewed     Result Review:  I have personally reviewed the results from the time of this admission to 8/13/2023 15:00 EDT and agree with these findings:  [x]  Laboratory  []  Microbiology  []  Radiology  [x]  EKG/Telemetry   []  Cardiology/Vascular   []  Pathology  []  Old records  []  Other:      Assessment & Plan       UTI (urinary tract infection)    Chronic mental illness    Drug abuse           Assessment: Methamphetamine abuse, anxiety   "   Treatment Plan:  Psychiatry consulted due to pt refusing to speak to staff, pt is evasive and guarded but appears to be oriented, not psychotic, or displaying any other bizarre behavior.   Pt would benefit from substance use treatment if agreeable based on history and positive uds.  Pt would benefit from community resources for outpatient psychiatric care if agreeable.      Continue Abilify 5 mg nightly     Continue Buspar 5 mg tid for anxiety      Will follow        Treatment Plan discussed with: Patient      I discussed the patients findings and my recommendations with patient and nursing staff    I have reviewed and approved the behavioral health treatment plans and problem list. Yes  Thank you for the consult   Referring MD has access to consult report and progress notes in EMR     CRISTIANA Dhillon  08/13/23  15:00 EDT             Electronically signed by Chikis Ny MD at 08/15/23 0906       Meredith Holden LPN at 08/13/23 0631          Goal Outcome Evaluation:              Outcome Evaluation: Pt refused BP and medications this AM.  Refused to give password and did not want family knowing any info.  Pt is stable at this time sleeping.  Being treated for a UTI with oral antibiotics.           Electronically signed by Meredith Holden LPN at 08/13/23 0631       Rand Herbert RN at 08/12/23 1727          Report to ARNALDO Gilman  Pt resting in the bed comfortably. RR even and unlabored.VSS      Electronically signed by Rand Herbert RN at 08/12/23 1728       Rand Herbert RN at 08/12/23 1654          Goal Outcome Evaluation:                          Electronically signed by Rand Herbert RN at 08/12/23 1654       Reva Shea APRN at 08/12/23 1453        Consult Orders    1. Inpatient Psychiatrist Consult [[id number]] ordered by Bárbara Silver APRN at 08/12/23 0048              Attestation signed by Chikis Ny MD at 08/15/23 0906    I have reviewed the  "mid-level documentation, and agree with the documentation, medical decision making and treatment plan as outlined by the mid-level provider.    This document has been electronically signed by Chikis Ny MD  August 15, 2023 09:05 EDT                                                                 Referring Provider:     Bárbara Silver APRN     Reason for Consultation: Refusal to speak     Chief complaint : Irritability, anxiety    Subjective .     History of present illness:  Simran Wall is a 53 y.o. female with a past medical history of mental illness, drug use (Meth), HTN, and Hypothyroidism who presented to [facility] on 8/11/2023 stating to the ED triage \"I am scared\".  Pt refused to speak to staff, when staff attempted to speak with the pt, she would close her eyes and turn away from the person talking.     Psychiatry consulted due to pt refusal to speak.  On chart review, patient was discharged from [facility] on 8/2/23 after being treated for meth induced delirium. Pt at that time exhibiting bizarre behavior, refused CD trt and was cleared for d/c.    Today pt is sitting in ED room eating breakfast. Pt knew she was being treated for a UTI and reported feeling anxious.  Pt hesitant to answer interview questions and guarded.   Pt appears restless tapping foot during interview, poor eye contact.  Pt denied SI/HI/AVH  Pt denied symptoms of psychosis, gerardo, hypomania.  Pt denied feeling sad, or hopeless.  Pt denied substance use, though tox screen positive for methamphetamine.  Pt has poor insight into health, and non-compliant with care.  Pt reports she is homeless at this time but has family she can stay with if she needs to.   Pt endorses symptoms of anxiety, pt reports feeling nervous, she wants to leave hospital.    Interview limited due to pt cooperation.  Pt did report that she felt safe today.  No family at bedside.        Review of Systems   Review of systems could not be " "obtained due to   pt cooperation     The following portions of the patient's history were reviewed and updated as appropriate: allergies, current medications, past family history, past medical history, past social history, past surgical history and problem list.    History    Past psychiatric history     No past medical history on file.     No family history on file.     Social History     Tobacco Use    Smoking status: Never    Smokeless tobacco: Never   Vaping Use    Vaping Use: Never used   Substance Use Topics    Alcohol use: Never    Drug use: Never     Comment: Pt states no drug use, tox screen positive amphetamines        (Not in a hospital admission)       Scheduled Meds:  sodium chloride, 10 mL, Intravenous, Q12H         Continuous Infusions:       PRN Meds:    acetaminophen **OR** acetaminophen **OR** acetaminophen    senna-docusate sodium **AND** polyethylene glycol **AND** bisacodyl **AND** bisacodyl    melatonin    ondansetron **OR** ondansetron    [COMPLETED] Insert Peripheral IV **AND** sodium chloride    sodium chloride    sodium chloride      Allergies:  Cephalexin and Ibuprofen      Objective     Vital Signs   /82 (BP Location: Left arm, Patient Position: Lying)   Pulse 77   Temp 98.3 øF (36.8 øC) (Oral)   Resp 18   Ht 157.5 cm (62\")   Wt 93.9 kg (207 lb 0.2 oz)   SpO2 98%   BMI 37.86 kg/mý     Physical Exam:    Muscle strength and tone: moderate, ue's, equal bilaterally  Abnormal Movements: Restless  Gait: VEGA     General Appearance:    Upright eating breakfast        Mental Status Exam:   Hygiene:   good  Cooperation:  Evasive, guarded   Eye Contact:  Poor  Psychomotor Behavior:  Restless  Affect:  Restricted  Mood: irritable  Hopelessness: Denies  Speech:  Minimal  Thought Process:  Goal directed and Linear  Thought Content:  Mood congruent  Suicidal:  None  Homicidal:  None  Hallucinations:  None  Delusion:  None  Memory:  Unable to evaluate  Orientation:  Person, Place, Time, and " Situation  Reliability:  poor  Insight:  Poor, evidenced by continued drug use   Judgement:  Impaired  Impulse Control:  Impaired  Physical/Medical Issues:  Yes          Medications and allergies reviewed     Result Review:  I have personally reviewed the results from the time of this admission to 2023 14:54 EDT and agree with these findings:  [x]  Laboratory  [x]  Microbiology  []  Radiology  [x]  EKG/Telemetry   []  Cardiology/Vascular   []  Pathology  [x]  Old records  []  Other:      Assessment & Plan       UTI (urinary tract infection)    Chronic mental illness    Drug abuse           Assessment: Methamphetamine abuse Treatment Plan: Psychiatry consulted due to pt refusing to speak to staff, pt is evasive and guarded but appears to be oriented, not psychotic, or displaying any other bizarre behavior.   Pt would benefit from substance use treatment if agreeable based on history and positive uds.  Pt would benefit from community resources for housing and outpatient psychiatric care if agreeable.     Pt chart showed medication history of Abilify 5 mg nightly, ok to restart by admitting team if appropriate, monitor EKG, QTc     Start Buspar 5 mg tid for anxiety     Will follow      Treatment Plan discussed with: Patient        I discussed the patients findings and my recommendations with patient and nursing staff    I have reviewed and approved the behavioral health treatment plans and problem list. Yes  Thank you for the consult   Referring MD has access to consult report and progress notes in EMR     CRISTIANA Dhillon  23  14:54 EDT             Electronically signed by Chikis Ny MD at 08/15/23 0906       Asher Oneil MD at 23 0927              [facility] Medicine Services   Daily Progress Note    Patient Name: Simran Wall  : 1970  MRN: [id number]  Primary Care Physician:  John Martino MD  Date of admission: 2023  Date and Time of Service:  9:27am      Subjective      Chief Complaint: anxiety    Pt seen and exmained  Feels very anxious  Objective      Vitals:   Temp:  [98.1 øF (36.7 øC)-98.4 øF (36.9 øC)] 98.1 øF (36.7 øC)  Heart Rate:  [84-88] 84  Resp:  [18-20] 20  BP: (136-153)/() 153/104    Physical Exam   GENERAL: NAD  HEENT: Nonicteric sclerae, PERRLA, EOMI. Oropharynx clear. Moist mucous membranes. Conjunctivae appear well perfused.  CHEST: Chest wall is nontender.  HEART: Regular rate and rhythm without murmurs. No MRG  LUNGS: Clear to auscultation bilaterally. No WRR  ABDOMEN: Soft, positive bowel sounds, nontender, no organomegaly.  RECTAL: Deferred.  SKIN: No rash, no excessive bruising, petechiae, or purpura.  NEUROLOGIC: Cranial nerves II-XII intact without motor/sensory deficit        Result Review    Result Review:  I have personally reviewed the results from the time of this admission to 8/12/2023 09:27 EDT and agree with these findings:  [x]  Laboratory  [x]  Microbiology  []  Radiology  []  EKG/Telemetry   []  Cardiology/Vascular   []  Pathology  []  Old records  []  Other:  Most notable findings include:           Assessment & Plan      Brief Patient Summary:  Simran Wall is a 53 y.o. female who       sodium chloride, 10 mL, Intravenous, Q12H             Active Hospital Problems:  Active Hospital Problems    Diagnosis     **UTI (urinary tract infection)     Drug abuse     Chronic mental illness      Plan:   UTI  -UA reviewed, squamous epithelial cells noted, 4+ bacteria  -Patient refuses to answer when asked about possible UTI symptoms including dysuria, frequency, urgency and abdominal pain  -1 Dose of antibiotics administered in ED     Chronic Mental Illness  Drug Abuse  -CT of the head reviewed  -Tox screen positive for meth  -Continue Abilify  -Psychiatry consult     Hypothyroidism  -Continue levothyroxine     Essential Hypertension  -Controlled  -Monitor BP  -Continue home Lisinopril       DVT prophylaxis:  Mechanical  DVT prophylaxis orders are present.    CODE STATUS:    Code Status (Patient has no pulse and is not breathing): CPR (Attempt to Resuscitate)  Medical Interventions (Patient has pulse or is breathing): Full Support            Electronically signed by Asher Oneil MD, 08/12/23, 09:27 EDT.  [facility]             Electronically signed by Asher Oneil MD at 08/12/23 0929        To prevent performance issues, not all notes are shown.    Go to Chart Review to see the rest of the notes.

## 2023-08-15 NOTE — PROGRESS NOTES
"  Chief complaint anxiety     Subjective .     History of present illness:  Simran Wall is a 53 y.o. female with a past medical history of mental illness, drug use (Meth), HTN, and Hypothyroidism who presented to [facility] on 8/11/2023 stating to the ED triage \"I am scared\".  Pt refused to speak to staff, when staff attempted to speak with the pt, she would close her eyes and turn away from the person talking.      Psychiatry consulted due to pt refusal to speak.  On chart review, patient was discharged from [facility] on 8/2/23 after being treated for meth induced delirium. Pt at that time exhibiting bizarre behavior, refused CD trt and was cleared for d/c.  Pt knew she was being treated for a UTI and reported feeling anxious.  Pt hesitant to answer interview questions and guarded.   Pt appeared restless tapping foot during interview, poor eye contact.  Pt denied SI/HI/AVH  Pt denied symptoms of psychosis, gerardo, hypomania.  Pt denied feeling sad, or hopeless.  Pt denied substance use, though tox screen positive for methamphetamine.  Pt has poor insight into health, and non-compliant with care.  Pt reports she is homeless at this time but has family she can stay with if she needs to.   Pt endorses symptoms of anxiety, pt reports feeling nervous, she wants to leave hospital.     8/15/23: I saw this patient this morning and she again refused to talk with me. She asked me to come back in the afternoon. I saw her this afternoon, and she was more cooperative and did talk with me. She was excited to be going home to her two dogs.     Review of Systems   All systems were reviewed and negative except for:  Behavioral/Psych: positive for  anhedonia and anxiety    The following portions of the patient's history were reviewed and updated as appropriate: allergies, current medications, past family history, past medical history, past social history, past surgical history and problem " "list.    History    Past psychiatric history\       Medications Prior to Admission   Medication Sig Dispense Refill Last Dose    ARIPiprazole (ABILIFY) 5 MG tablet Take 1 tablet by mouth Every Night. (Patient not taking: Reported on 8/12/2023) 30 tablet 0 Not Taking    levothyroxine (SYNTHROID, LEVOTHROID) 50 MCG tablet Take 1 tablet by mouth Every Morning. 30 tablet 0 Not Taking    lisinopril (PRINIVIL,ZESTRIL) 5 MG tablet Take 1 tablet by mouth Daily. 30 tablet 0 Not Taking        Scheduled Meds:  ARIPiprazole, 5 mg, Oral, Nightly  busPIRone, 5 mg, Oral, TID  enoxaparin, 40 mg, Subcutaneous, Q24H  levothyroxine, 50 mcg, Oral, Q AM  lisinopril, 5 mg, Oral, Q24H  sodium chloride, 10 mL, Intravenous, Q12H         Continuous Infusions:       PRN Meds:    acetaminophen **OR** acetaminophen **OR** acetaminophen    senna-docusate sodium **AND** polyethylene glycol **AND** bisacodyl **AND** bisacodyl    melatonin    ondansetron **OR** ondansetron    [COMPLETED] Insert Peripheral IV **AND** sodium chloride    sodium chloride    sodium chloride      Allergies:  Cephalexin and Ibuprofen      Objective     Vital Signs   /79 (BP Location: Right arm, Patient Position: Lying)   Pulse 78   Temp 98.3 øF (36.8 øC) (Oral)   Resp 17   Ht 157.5 cm (62\")   Wt 96.2 kg (212 lb 1.3 oz)   SpO2 96%   BMI 38.79 kg/mý     Physical Exam:    Musculoskeletal:   Muscle strength and tone: moderate, equal bilaterally.   Abnormal Movements: None noted.   Gait: VEGA, patient in bed.      General Appearance:    Resting in bed, in NAD.      Mental Status Exam:   Hygiene:   good  Cooperation:  Guarded  Eye Contact:  Poor  Psychomotor Behavior:  Slow  Affect:  Restricted  Mood: depressed  Hopelessness: Denies  Speech:  Minimal  Thought Process:  Goal directed and Linear  Thought Content:  Normal and Mood congruent  Suicidal:  None  Homicidal:  None  Hallucinations:  None  Delusion:  None  Memory:  Intact  Orientation:  Person, Place, Time, " and Situation  Reliability:  poor  Insight:  Poor  Judgement:  Poor  Impulse Control:  Impaired     MSE from 8/14/23 reviewed and accepted with changes.     Medications and allergies reviewed.    Result Review:  I have personally reviewed the results from the time of this admission to 8/15/2023 14:04 EDT and agree with these findings:  [x]  Laboratory  []  Microbiology  []  Radiology  [x]  EKG/Telemetry   []  Cardiology/Vascular   []  Pathology  []  Old records  []  Other:      Assessment & Plan       UTI (urinary tract infection)    Chronic mental illness    Drug abuse       Assessment: methamphetamine abuse, anxiety   Treatment Plan: Psychiatry consulted due to pt refusing to speak to staff. On today's assessment, patient was more cooperative and upbeat. She was excited to be going home later today.     Previous provider started abilify and buspirone. Those medications can be continued.     Patient would benefit from substance use treatment if agreeable based on history and positive uds.    Pt would benefit from community resources for outpatient psychiatric care if agreeable.      Continue Abilify 5 mg nightly     Continue Buspar 5 mg tid for anxiety      Will follow PRN.   Treatment Plan discussed with: Patient    I discussed the patients findings and my recommendations with     I have reviewed and approved the behavioral health treatment plans and problem list. Yes     Referring MD has access to consult report and progress notes in EMR     CRISTIANA Kaiser  08/15/23  14:04 EDT

## 2023-08-15 NOTE — DISCHARGE PLACEMENT REQUEST
"Simran Yao (53 y.o. Female)       Date of Birth   1970    Social Security Number       Address   [address] [place] [address] [place] [facility]    Home Phone   [phone]    MRN   [id number]       Restorationism   None    Marital Status                               Admission Date   8/11/23    Admission Type   Emergency    Admitting Provider   Asher Oneil MD    Attending Provider   Jeremiah Saldana MD    Department, Room/Bed   [facility] 3C MEDICAL INPATIENT, 362/1       Discharge Date       Discharge Disposition       Discharge Destination                                 Attending Provider: Jeremiah Saldana MD    Allergies: Cephalexin, Ibuprofen    Isolation: None   Infection: None   Code Status: CPR    Ht: 157.5 cm (62\")   Wt: 96.2 kg (212 lb 1.3 oz)    Admission Cmt: None   Principal Problem: UTI (urinary tract infection) [N39.0]                   Active Insurance as of 8/11/2023       Primary Coverage       Payor Plan Insurance Group Employer/Plan Group    HUMANA MEDICARE REPLACEMENT HUMANA MEDICARE REPLACEMENT [id number]       Payor Plan Address Payor Plan Phone Number Payor Plan Fax Number Effective Dates    [address] [address] [phone]  3/1/2023 - None Entered    [facility] [address]         Subscriber Name Subscriber Birth Date Member ID       SIMRAN YAO 1970 [id number]               Secondary Coverage       Payor Plan Insurance Group Employer/Plan Group    [place]        Payor Plan Address Payor Plan Phone Number Payor Plan Fax Number Effective Dates    [address]   5/24/2021 - None Entered    [place] [address]         Subscriber Name Subscriber Birth Date Member ID       SIMRAN YAO 1970 [id number]                     Emergency Contacts        (Rel.) Home Phone Work Phone Mobile Phone    CLARITZA NUNEZ (Son) -- -- [phone]    KRISTIAN YAO (Daughter) -- -- [phone]          "

## 2023-08-15 NOTE — DISCHARGE SUMMARY
"               [facility]         DISCHARGE SUMMARY    Patient Name: Simran Wall  : 1970  MRN: [id number]    Date of Admission: 2023  Date of Discharge:  8/15/2023  Primary Care Physician: John Martino MD    Consults       Date and Time Order Name Status Description    2023 12:48 AM Inpatient Psychiatrist Consult Completed             Presenting Problem:   UTI (urinary tract infection) [N39.0]  Altered mental status, unspecified altered mental status type [R41.82]  Amphetamine use [F15.90]    Active and Resolved Hospital Problems:  Active Hospital Problems    Diagnosis POA    **UTI (urinary tract infection) [N39.0] Yes    Drug abuse [F19.10] Yes    Chronic mental illness [F99] Yes      Resolved Hospital Problems   No resolved problems to display.         Hospital Course     Hospital Course:    Simran Wall is a 53 y.o. female with a past medical history of mental illness, drug use (Meth), HTN, and Hypothyroidism who presented to [facility] on 2023 stating to the ED triage \"I am scared\".  She has refused to speak to the Nurse, ED provider and myself.  She did follow commands when asked to take medication.  When attempting to speak with her, she will close her eyes and turn away from the person talking.     In the ED, UA showed 3+ bacteria but was contaminated with 13-20 squamous epithelial cells. Ethanol was less than 0.010%. Tox screen was positive for methamphetamine. CT of the head showed no acute abnormality. She is afebrile, all vitals are stable. Hospitalist was consulted for further management.     On chart review, patient was discharged from [facility] on 23 after being treated for meth induced delirium.  She was initially discharged from the ED fully clothed but was found later wearing only a towel in the surgical department.  When brought back to the ED, she refused to state why she was in the surgical department. She was seen by psychiatry but " refused CD tx so was cleared for DC.     Unable to perform ROS, patient refuses to speak or give any history as to why she came to the ED.    Patient was evaluated by psychiatrist, patient was started on BuSpar, she will be discharged home today, initially she was supposed to go to skilled nursing facility but she changed her mind.    Day of Discharge     Vital Signs:  Temp:  [98.3 øF (36.8 øC)-98.7 øF (37.1 øC)] 98.3 øF (36.8 øC)  Heart Rate:  [64-80] 78  Resp:  [15-17] 17  BP: (116-142)/(59-79) 128/79  Physical Exam:  Constitutional: Awake, alert   Eyes: PERRLA, sclerae anicteric, no conjunctival injection   HENT: NCAT, mucous membranes moist   Neck: Supple, no thyromegaly, no lymphadenopathy, trachea midline   Respiratory: Clear to auscultation bilaterally, nonlabored respirations    Cardiovascular: RRR, no murmurs, rubs, or gallops, palpable pedal pulses bilaterally   Gastrointestinal: Positive bowel sounds, soft, nontender, nondistended   Musculoskeletal: No bilateral ankle edema, no clubbing or cyanosis to extremities   Psychiatric: Appropriate affect, cooperative   Neurologic: Oriented x 3, strength symmetric in all extremities, Cranial Nerves grossly intact to confrontation, speech clear   Skin: No rashes     Pertinent  and/or Most Recent Results     LAB RESULTS:      Lab 08/12/23 2248 08/11/23 2040   WBC 6.90 11.00*   HEMOGLOBIN 13.1 14.4   HEMATOCRIT 39.9 42.2   PLATELETS 264 283   NEUTROS ABS 2.80 5.90   LYMPHS ABS 3.40* 3.80*   MONOS ABS 0.50 0.90   EOS ABS 0.20 0.20   MCV 89.0 87.0         Lab 08/12/23 2248 08/11/23 2129   SODIUM 142 140   POTASSIUM 3.5 3.4*   CHLORIDE 103 102   CO2 26.0 24.0   ANION GAP 13.0 14.0   BUN 10 12   CREATININE 0.80 0.85   EGFR 87.1 81.0   GLUCOSE 181* 182*   CALCIUM 8.9 9.1         Lab 08/11/23 2129   TOTAL PROTEIN 7.0   ALBUMIN 4.2   GLOBULIN 2.8   ALT (SGPT) 13   AST (SGOT) 17   BILIRUBIN 0.4   ALK PHOS 76                     Brief Urine Lab Results  (Last result in  the past 365 days)        Color   Clarity   Blood   Leuk Est   Nitrite   Protein   CREAT   Urine HCG        08/14/23 1922 Yellow   Clear   Negative   Negative   Negative   Negative                 Microbiology Results (last 10 days)       ** No results found for the last 240 hours. **            CT Head Without Contrast    Result Date: 8/11/2023  Impression: Impression: No acute intracranial abnormality. Electronically Signed: Alexys Scott DO  8/11/2023 10:31 PM EDT  Workstation ID: [id number]    CT Head Without Contrast    Result Date: 7/31/2023  Impression: 1.No acute intracranial abnormality is identified. 2.Periventricular white matter hypoattenuation may be related to chronic microvascular ischemic change. This is greater than expected given the patient's age. Follow-up MRI may be considered if clinically indicated. Electronically Signed: Timothy Rodriguez  7/31/2023 7:58 AM EDT  Workstation ID: [id number]                 Labs Pending at Discharge:        Discharge Details        Discharge Medications        New Medications        Instructions Start Date   busPIRone 5 MG tablet  Commonly known as: BUSPAR   5 mg, Oral, 3 Times Daily      levothyroxine 50 MCG tablet  Commonly known as: SYNTHROID, LEVOTHROID   50 mcg, Oral, Every Early Morning      lisinopril 5 MG tablet  Commonly known as: PRINIVIL,ZESTRIL   5 mg, Oral, Every 24 Hours Scheduled             Stop These Medications      ARIPiprazole 5 MG tablet  Commonly known as: ABILIFY              Allergies   Allergen Reactions    Cephalexin Unknown (See Comments)    Ibuprofen Unknown (See Comments)         Discharge Disposition:   Home or Self Care    Discharge Condition: .cond    Diet:  Hospital:  Diet Order   Procedures    Diet: Cardiac Diets, Diabetic Diets; Healthy Heart (2-3 Na+); Consistent Carbohydrate; Texture: Regular Texture (IDDSI 7); Fluid Consistency: Thin (IDDSI 0)         Discharge Activity:         CODE STATUS:  Code Status and Medical  Interventions:   Ordered at: 08/12/23 0048     Code Status (Patient has no pulse and is not breathing):    CPR (Attempt to Resuscitate)     Medical Interventions (Patient has pulse or is breathing):    Full Support         No future appointments.        Time spent on Discharge including face to face service:  30 minutes    Jeremiah Saldana MD

## 2023-08-16 NOTE — OUTREACH NOTE
Prep Survey      Flowsheet Row Responses   [facility] facility patient discharged from? [facility]   Is LACE score < 7 ? No   Eligibility Readm Mgmt   Discharge diagnosis UTI / Drug abuse   Does the patient have one of the following disease processes/diagnoses(primary or secondary)? Other   Does the patient have Home health ordered? No   Is there a DME ordered? No   Prep survey completed? Yes            Herminia SHEIKH - Registered Nurse

## 2023-08-16 NOTE — CASE MANAGEMENT/SOCIAL WORK
Case Management Discharge Note      Final Note: Routine home.      Selected Continued Care - Discharged on 8/15/2023 Admission date: 8/11/2023 - Discharge disposition: Home or Self Care       Transportation Services  W/C Myke: Nola Stringer    Final Discharge Disposition Code: 01 - home or self-care

## 2023-08-20 ENCOUNTER — HOSPITAL ENCOUNTER (EMERGENCY)
Facility: HOSPITAL | Age: 53
Discharge: OTHER INSTITUTION | End: 2023-08-21
Attending: EMERGENCY MEDICINE
Payer: MEDICARE

## 2023-08-20 DIAGNOSIS — F32.A DEPRESSION WITH SUICIDAL IDEATION: Primary | ICD-10-CM

## 2023-08-20 DIAGNOSIS — F29 PSYCHOSIS, UNSPECIFIED PSYCHOSIS TYPE: ICD-10-CM

## 2023-08-20 DIAGNOSIS — R45.851 DEPRESSION WITH SUICIDAL IDEATION: Primary | ICD-10-CM

## 2023-08-20 LAB
ALBUMIN SERPL-MCNC: 4.2 G/DL (ref 3.5–5.2)
ALBUMIN/GLOB SERPL: 1.6 G/DL
ALP SERPL-CCNC: 67 U/L (ref 39–117)
ALT SERPL W P-5'-P-CCNC: 14 U/L (ref 1–33)
AMPHET+METHAMPHET UR QL: POSITIVE
ANION GAP SERPL CALCULATED.3IONS-SCNC: 13 MMOL/L (ref 5–15)
APAP SERPL-MCNC: <5 MCG/ML (ref 0–30)
AST SERPL-CCNC: 18 U/L (ref 1–32)
BACTERIA UR QL AUTO: ABNORMAL /HPF
BARBITURATES UR QL SCN: NEGATIVE
BASOPHILS # BLD AUTO: 0.2 10*3/MM3 (ref 0–0.2)
BASOPHILS NFR BLD AUTO: 1.4 % (ref 0–1.5)
BENZODIAZ UR QL SCN: NEGATIVE
BILIRUB SERPL-MCNC: 0.2 MG/DL (ref 0–1.2)
BILIRUB UR QL STRIP: NEGATIVE
BUN SERPL-MCNC: 11 MG/DL (ref 6–20)
BUN/CREAT SERPL: 14.7 (ref 7–25)
CALCIUM SPEC-SCNC: 9.7 MG/DL (ref 8.6–10.5)
CANNABINOIDS SERPL QL: NEGATIVE
CHLORIDE SERPL-SCNC: 100 MMOL/L (ref 98–107)
CK SERPL-CCNC: 114 U/L (ref 20–180)
CLARITY UR: CLEAR
CO2 SERPL-SCNC: 25 MMOL/L (ref 22–29)
COCAINE UR QL: NEGATIVE
COLOR UR: YELLOW
CREAT SERPL-MCNC: 0.75 MG/DL (ref 0.57–1)
DEPRECATED RDW RBC AUTO: 45.1 FL (ref 37–54)
EGFRCR SERPLBLD CKD-EPI 2021: 94.2 ML/MIN/1.73
EOSINOPHIL # BLD AUTO: 0.1 10*3/MM3 (ref 0–0.4)
EOSINOPHIL NFR BLD AUTO: 0.6 % (ref 0.3–6.2)
ERYTHROCYTE [DISTWIDTH] IN BLOOD BY AUTOMATED COUNT: 14.1 % (ref 12.3–15.4)
ETHANOL UR QL: <0.01 %
GLOBULIN UR ELPH-MCNC: 2.7 GM/DL
GLUCOSE SERPL-MCNC: 139 MG/DL (ref 65–99)
GLUCOSE UR STRIP-MCNC: NEGATIVE MG/DL
HCT VFR BLD AUTO: 41.4 % (ref 34–46.6)
HGB BLD-MCNC: 13.6 G/DL (ref 12–15.9)
HGB UR QL STRIP.AUTO: NEGATIVE
HYALINE CASTS UR QL AUTO: ABNORMAL /LPF
KETONES UR QL STRIP: ABNORMAL
LEUKOCYTE ESTERASE UR QL STRIP.AUTO: ABNORMAL
LYMPHOCYTES # BLD AUTO: 3.1 10*3/MM3 (ref 0.7–3.1)
LYMPHOCYTES NFR BLD AUTO: 25.3 % (ref 19.6–45.3)
MAGNESIUM SERPL-MCNC: 2 MG/DL (ref 1.6–2.6)
MCH RBC QN AUTO: 28.6 PG (ref 26.6–33)
MCHC RBC AUTO-ENTMCNC: 32.7 G/DL (ref 31.5–35.7)
MCV RBC AUTO: 87.3 FL (ref 79–97)
METHADONE UR QL SCN: NEGATIVE
MONOCYTES # BLD AUTO: 0.8 10*3/MM3 (ref 0.1–0.9)
MONOCYTES NFR BLD AUTO: 6.6 % (ref 5–12)
NEUTROPHILS NFR BLD AUTO: 66.1 % (ref 42.7–76)
NEUTROPHILS NFR BLD AUTO: 8.1 10*3/MM3 (ref 1.7–7)
NITRITE UR QL STRIP: NEGATIVE
NRBC BLD AUTO-RTO: 0.1 /100 WBC (ref 0–0.2)
OPIATES UR QL: NEGATIVE
OXYCODONE UR QL SCN: NEGATIVE
PH UR STRIP.AUTO: <=5 [PH] (ref 5–8)
PLATELET # BLD AUTO: 273 10*3/MM3 (ref 140–450)
PMV BLD AUTO: 9.1 FL (ref 6–12)
POTASSIUM SERPL-SCNC: 4.2 MMOL/L (ref 3.5–5.2)
PROT SERPL-MCNC: 6.9 G/DL (ref 6–8.5)
PROT UR QL STRIP: NEGATIVE
RBC # BLD AUTO: 4.74 10*6/MM3 (ref 3.77–5.28)
RBC # UR STRIP: ABNORMAL /HPF
REF LAB TEST METHOD: ABNORMAL
SALICYLATES SERPL-MCNC: 0.3 MG/DL
SODIUM SERPL-SCNC: 138 MMOL/L (ref 136–145)
SP GR UR STRIP: 1.02 (ref 1–1.03)
SQUAMOUS #/AREA URNS HPF: ABNORMAL /HPF
UROBILINOGEN UR QL STRIP: ABNORMAL
WBC # UR STRIP: ABNORMAL /HPF
WBC NRBC COR # BLD: 12.3 10*3/MM3 (ref 3.4–10.8)

## 2023-08-20 PROCEDURE — 85025 COMPLETE CBC W/AUTO DIFF WBC: CPT | Performed by: EMERGENCY MEDICINE

## 2023-08-20 PROCEDURE — 82550 ASSAY OF CK (CPK): CPT | Performed by: EMERGENCY MEDICINE

## 2023-08-20 PROCEDURE — 80053 COMPREHEN METABOLIC PANEL: CPT | Performed by: EMERGENCY MEDICINE

## 2023-08-20 PROCEDURE — 80143 DRUG ASSAY ACETAMINOPHEN: CPT | Performed by: EMERGENCY MEDICINE

## 2023-08-20 PROCEDURE — 82077 ASSAY SPEC XCP UR&BREATH IA: CPT | Performed by: EMERGENCY MEDICINE

## 2023-08-20 PROCEDURE — 80307 DRUG TEST PRSMV CHEM ANLYZR: CPT | Performed by: EMERGENCY MEDICINE

## 2023-08-20 PROCEDURE — 83735 ASSAY OF MAGNESIUM: CPT | Performed by: EMERGENCY MEDICINE

## 2023-08-20 PROCEDURE — 99284 EMERGENCY DEPT VISIT MOD MDM: CPT

## 2023-08-20 PROCEDURE — 36415 COLL VENOUS BLD VENIPUNCTURE: CPT

## 2023-08-20 PROCEDURE — 80179 DRUG ASSAY SALICYLATE: CPT | Performed by: EMERGENCY MEDICINE

## 2023-08-20 PROCEDURE — 81001 URINALYSIS AUTO W/SCOPE: CPT | Performed by: EMERGENCY MEDICINE

## 2023-08-20 RX ORDER — SODIUM CHLORIDE 0.9 % (FLUSH) 0.9 %
10 SYRINGE (ML) INJECTION AS NEEDED
Status: DISCONTINUED | OUTPATIENT
Start: 2023-08-20 | End: 2023-08-21 | Stop reason: HOSPADM

## 2023-08-20 NOTE — ED NOTES
Patient called out stating she found tick on her and wanted a cup to put it in. Patient concerned she will get sick from it. Cup provided

## 2023-08-20 NOTE — ED NOTES
Provider and nurse at bedside. Patient is alert and looks at staff when spoke to but is unable to answer any questions at this time. When asked was brought her to the ER patient shrugged shoulders.

## 2023-08-21 VITALS
HEIGHT: 61 IN | BODY MASS INDEX: 34.93 KG/M2 | HEART RATE: 72 BPM | TEMPERATURE: 97.5 F | RESPIRATION RATE: 18 BRPM | OXYGEN SATURATION: 96 % | DIASTOLIC BLOOD PRESSURE: 63 MMHG | SYSTOLIC BLOOD PRESSURE: 126 MMHG | WEIGHT: 185 LBS

## 2023-08-21 NOTE — ED PROVIDER NOTES
Subjective   History of Present Illness  Chief complaint hearing voices hallucinations suicidal thoughts depression    History of present illness 53-year-old female seen admitted to the hospital recently and discharged on the 15th August after having a UTI and some altered mental status and methamphetamine use.  Patient states she is no longer using drugs she states that she is hearing voices are telling her to harm herself she is cutting herself she is depressed she has intermittent suicidal thoughts but no active plan.  She denies any injury no headaches vision change speech difficulty paralysis no chest pain neck arm jaw pain or fever chills no urinary problems denies any other change in medications.  And no other complaints at this time    Review of Systems   Constitutional:  Negative for chills and fever.   Respiratory:  Negative for chest tightness and shortness of breath.    Cardiovascular:  Negative for chest pain and palpitations.   Gastrointestinal:  Negative for abdominal pain and vomiting.   Musculoskeletal:  Negative for back pain and neck pain.   Skin:  Negative for rash.   Neurological:  Negative for facial asymmetry, speech difficulty and headaches.   Psychiatric/Behavioral:  Positive for agitation, hallucinations and self-injury. The patient is nervous/anxious.      Past Medical History:   Diagnosis Date    Diabetes mellitus     Hypertension        Allergies   Allergen Reactions    Cephalexin Unknown (See Comments)    Ibuprofen Unknown (See Comments)       No past surgical history on file.    No family history on file.    Social History     Socioeconomic History    Marital status:    Tobacco Use    Smoking status: Never     Passive exposure: Never    Smokeless tobacco: Never   Vaping Use    Vaping Use: Never used   Substance and Sexual Activity    Alcohol use: Never    Drug use: Yes     Types: Methamphetamines     Comment: Pt states no drug use, tox screen positive amphetamines    Sexual  activity: Defer     Prior to Admission medications    Medication Sig Start Date End Date Taking? Authorizing Provider   busPIRone (BUSPAR) 5 MG tablet Take 1 tablet by mouth 3 (Three) Times a Day. 8/15/23   Jeremiah Saldana MD   levothyroxine (SYNTHROID, LEVOTHROID) 50 MCG tablet Take 1 tablet by mouth Every Morning. 8/3/23   Roque Galarza MD   lisinopril (PRINIVIL,ZESTRIL) 5 MG tablet Take 1 tablet by mouth Daily. 8/3/23   Roque Galarza MD          Objective   Physical Exam  Constitutional is a 53-year-old female awake alert no acute distress triage vital signs reviewed.  HEENT extraocular muscles are intact pupils equal round react sclera clear there is no photophobia mouth clear.  Neck supple no adenopathy no JVD no bruits.  No thyroid nodules lungs clear no retraction no use of accessories.  Heart regular without murmur abdomen soft nontender good bowel sounds no peritoneal findings or pulsatile mass extremities pulses equal upper and lower extremities no edema cords or Homans' sign or evidence of DVT.  She has a few superficial cuts on her wrist.  Neurologic awake alert and follows commands motor strength normal orientated x4 no facial asymmetry speech normal no drift the arms legs without focal weakness.  Procedures           ED Course  ED Course as of 08/21/23 0230   Sun Aug 20, 2023   2336 Assumed care from Dr. Ashley Beltre states they have a lot of people in their waiting room and the patient is on their waiting list to be evaluated [KW]   Mon Aug 21, 2023   0229 Patient was evaluated by [facility] and the psychiatrist recommends [facility] and admission for psychosis [KW]      ED Course User Index  [KW] Porsha Sumner, APRN      Results for orders placed or performed during the hospital encounter of 08/20/23   Comprehensive Metabolic Panel    Specimen: Arm, Right; Blood   Result Value Ref Range    Glucose 139 (H) 65 - 99 mg/dL    BUN 11 6 - 20 mg/dL    Creatinine 0.75 0.57 - 1.00 mg/dL    Sodium 138 136 -  145 mmol/L    Potassium 4.2 3.5 - 5.2 mmol/L    Chloride 100 98 - 107 mmol/L    CO2 25.0 22.0 - 29.0 mmol/L    Calcium 9.7 8.6 - 10.5 mg/dL    Total Protein 6.9 6.0 - 8.5 g/dL    Albumin 4.2 3.5 - 5.2 g/dL    ALT (SGPT) 14 1 - 33 U/L    AST (SGOT) 18 1 - 32 U/L    Alkaline Phosphatase 67 39 - 117 U/L    Total Bilirubin 0.2 0.0 - 1.2 mg/dL    Globulin 2.7 gm/dL    A/G Ratio 1.6 g/dL    BUN/Creatinine Ratio 14.7 7.0 - 25.0    Anion Gap 13.0 5.0 - 15.0 mmol/L    eGFR 94.2 >60.0 mL/min/1.73   Urinalysis With Microscopic If Indicated (No Culture) - Urine, Clean Catch    Specimen: Urine, Clean Catch   Result Value Ref Range    Color, UA Yellow Yellow, Straw    Appearance, UA Clear Clear    pH, UA <=5.0 5.0 - 8.0    Specific Gravity, UA 1.016 1.005 - 1.030    Glucose, UA Negative Negative    Ketones, UA Trace (A) Negative    Bilirubin, UA Negative Negative    Blood, UA Negative Negative    Protein, UA Negative Negative    Leuk Esterase, UA Trace (A) Negative    Nitrite, UA Negative Negative    Urobilinogen, UA 0.2 E.U./dL 0.2 - 1.0 E.U./dL   CK    Specimen: Arm, Right; Blood   Result Value Ref Range    Creatine Kinase 114 20 - 180 U/L   Magnesium    Specimen: Arm, Right; Blood   Result Value Ref Range    Magnesium 2.0 1.6 - 2.6 mg/dL   Urine Drug Screen - Urine, Clean Catch    Specimen: Urine, Clean Catch   Result Value Ref Range    Amphet/Methamphet, Screen Positive (A) Negative    Barbiturates Screen, Urine Negative Negative    Benzodiazepine Screen, Urine Negative Negative    Cocaine Screen, Urine Negative Negative    Opiate Screen Negative Negative    THC, Screen, Urine Negative Negative    Methadone Screen, Urine Negative Negative    Oxycodone Screen, Urine Negative Negative   Ethanol    Specimen: Arm, Right; Blood   Result Value Ref Range    Ethanol % <0.010 %   Acetaminophen Level    Specimen: Arm, Right; Blood   Result Value Ref Range    Acetaminophen <5.0 0.0 - 30.0 mcg/mL   Salicylate Level    Specimen: Arm,  Right; Blood   Result Value Ref Range    Salicylate 0.3 <=30.0 mg/dL   CBC Auto Differential    Specimen: Arm, Right; Blood   Result Value Ref Range    WBC 12.30 (H) 3.40 - 10.80 10*3/mm3    RBC 4.74 3.77 - 5.28 10*6/mm3    Hemoglobin 13.6 12.0 - 15.9 g/dL    Hematocrit 41.4 34.0 - 46.6 %    MCV 87.3 79.0 - 97.0 fL    MCH 28.6 26.6 - 33.0 pg    MCHC 32.7 31.5 - 35.7 g/dL    RDW 14.1 12.3 - 15.4 %    RDW-SD 45.1 37.0 - 54.0 fl    MPV 9.1 6.0 - 12.0 fL    Platelets 273 140 - 450 10*3/mm3    Neutrophil % 66.1 42.7 - 76.0 %    Lymphocyte % 25.3 19.6 - 45.3 %    Monocyte % 6.6 5.0 - 12.0 %    Eosinophil % 0.6 0.3 - 6.2 %    Basophil % 1.4 0.0 - 1.5 %    Neutrophils, Absolute 8.10 (H) 1.70 - 7.00 10*3/mm3    Lymphocytes, Absolute 3.10 0.70 - 3.10 10*3/mm3    Monocytes, Absolute 0.80 0.10 - 0.90 10*3/mm3    Eosinophils, Absolute 0.10 0.00 - 0.40 10*3/mm3    Basophils, Absolute 0.20 0.00 - 0.20 10*3/mm3    nRBC 0.1 0.0 - 0.2 /100 WBC   Urinalysis, Microscopic Only - Urine, Clean Catch    Specimen: Urine, Clean Catch   Result Value Ref Range    RBC, UA 0-2 (A) None Seen /HPF    WBC, UA 6-12 (A) None Seen /HPF    Bacteria, UA Trace (A) None Seen /HPF    Squamous Epithelial Cells, UA 3-6 (A) None Seen, 0-2 /HPF    Hyaline Casts, UA 7-12 None Seen /LPF    Methodology Manual Light Microscopy      No radiology results for the last day  Medications   sodium chloride 0.9 % flush 10 mL (has no administration in time range)                                            Medical Decision Making  Medical decision making.  IV established monitor placement review of sinus rhythm.  Labs obtained independent reviewed by me.  The patient have a comprehensive metabolic profile unremarkable.  Urine was negative BBC unremarkable and a white count of 12.3 Tylenol aspirin alcohol negative drug screen showed amphetamines methamphetamine.  EKG normal.  Recent thyroid testing was negative.  Not repeated.  Patient repeat examination at 9:55 PM resting  comfortably blood pressure 180/100.  Temperature is 98.4 heart rate on my exam was 90.  Respirations 18.  Patient has intermittent suicidal thoughts but no plan.  She hears voices telling her to harm herself.  I do not see evidence suggest acute stroke or any evidence suggest meningitis or encephalitis or acute infectious process currently.  Patient does have a positive x-ray for methamphetamine although she states she quit using.  She has a family history of schizophrenia.  Patient awaiting [facility] consultation.  This was turned over to the nurse practitioner Mahogany Sumner for further disposition pending the consultation results.  Patient remains very pleasant and cooperative at this time.    Problems Addressed:  Depression with suicidal ideation: complicated acute illness or injury  Psychosis, unspecified psychosis type: complicated acute illness or injury    Amount and/or Complexity of Data Reviewed  Labs: ordered. Decision-making details documented in ED Course.    Risk  Prescription drug management.        Final diagnoses:   Depression with suicidal ideation   Psychosis, unspecified psychosis type       ED Disposition  ED Disposition       ED Disposition   Transfer to Another Facility     Condition   --    Comment   [facility] for admission               No follow-up provider specified.       Medication List      No changes were made to your prescriptions during this visit.            Porsha Sumner, APRN  08/21/23 0230

## 2023-08-21 NOTE — ED NOTES
[facility] called and said it would be a while before they could assess patient due to back log in their waiting room

## 2023-08-21 NOTE — ED NOTES
Patient is now on a 72 hour hold at the request of Dr. Sullivan ([facility]).  Paperwork faxed to [facility] for transport and to [facility].  Copy placed in medical records tray to be scanned.

## 2023-08-21 NOTE — ED NOTES
Patient aware that we need urine sample. Pt is unable to provide one at this time but verbalized that she will press call light when she is able to.

## 2023-08-24 ENCOUNTER — READMISSION MANAGEMENT (OUTPATIENT)
Dept: CALL CENTER | Facility: HOSPITAL | Age: 53
End: 2023-08-24
Payer: MEDICARE

## 2023-08-24 NOTE — OUTREACH NOTE
Medical Week 2 Survey      Flowsheet Row Responses   [facility] facility patient discharged from? [facility]   Does the patient have one of the following disease processes/diagnoses(primary or secondary)? Other   Week 2 attempt successful? No   Unsuccessful attempts Attempt 1            Volodymyr CHAPMAN - Registered Nurse

## 2023-08-31 ENCOUNTER — READMISSION MANAGEMENT (OUTPATIENT)
Dept: CALL CENTER | Facility: HOSPITAL | Age: 53
End: 2023-08-31
Payer: MEDICARE

## 2023-08-31 NOTE — OUTREACH NOTE
Medical Week 3 Survey      Flowsheet Row Responses   [facility] patient discharged from? [facility]   Does the patient have one of the following disease processes/diagnoses(primary or secondary)? Other   Week 3 attempt successful? No   Unsuccessful attempts Attempt 1   Revoke Decline to participate            Larissa GARDNER - Registered Nurse

## 2023-09-28 ENCOUNTER — HOSPITAL ENCOUNTER (EMERGENCY)
Facility: HOSPITAL | Age: 53
Discharge: HOME | End: 2023-09-28
Attending: EMERGENCY MEDICINE | Admitting: EMERGENCY MEDICINE
Payer: MEDICARE

## 2023-09-28 ENCOUNTER — APPOINTMENT (OUTPATIENT)
Dept: CT IMAGING | Facility: HOSPITAL | Age: 53
End: 2023-09-28
Payer: MEDICARE

## 2023-09-28 VITALS
SYSTOLIC BLOOD PRESSURE: 115 MMHG | TEMPERATURE: 98.9 F | BODY MASS INDEX: 34.93 KG/M2 | WEIGHT: 185 LBS | OXYGEN SATURATION: 95 % | RESPIRATION RATE: 18 BRPM | HEIGHT: 61 IN | HEART RATE: 87 BPM | DIASTOLIC BLOOD PRESSURE: 55 MMHG

## 2023-09-28 DIAGNOSIS — R79.89 ABNORMAL LIVER FUNCTION TEST: ICD-10-CM

## 2023-09-28 DIAGNOSIS — R10.9 ACUTE RIGHT FLANK PAIN: Primary | ICD-10-CM

## 2023-09-28 LAB
ALBUMIN SERPL-MCNC: 4.3 G/DL (ref 3.5–5.2)
ALBUMIN/GLOB SERPL: 1.3 G/DL
ALP SERPL-CCNC: 139 U/L (ref 39–117)
ALT SERPL W P-5'-P-CCNC: 53 U/L (ref 1–33)
ANION GAP SERPL CALCULATED.3IONS-SCNC: 13 MMOL/L (ref 5–15)
AST SERPL-CCNC: 39 U/L (ref 1–32)
BASOPHILS # BLD AUTO: 0.1 10*3/MM3 (ref 0–0.2)
BASOPHILS NFR BLD AUTO: 1.1 % (ref 0–1.5)
BILIRUB SERPL-MCNC: 0.2 MG/DL (ref 0–1.2)
BILIRUB UR QL STRIP: NEGATIVE
BUN SERPL-MCNC: 17 MG/DL (ref 6–20)
BUN/CREAT SERPL: 18.9 (ref 7–25)
CALCIUM SPEC-SCNC: 11 MG/DL (ref 8.6–10.5)
CHLORIDE SERPL-SCNC: 98 MMOL/L (ref 98–107)
CLARITY UR: CLEAR
CO2 SERPL-SCNC: 26 MMOL/L (ref 22–29)
COLOR UR: YELLOW
CREAT SERPL-MCNC: 0.9 MG/DL (ref 0.57–1)
DEPRECATED RDW RBC AUTO: 47.7 FL (ref 37–54)
EGFRCR SERPLBLD CKD-EPI 2021: 75.7 ML/MIN/1.73
EOSINOPHIL # BLD AUTO: 0.2 10*3/MM3 (ref 0–0.4)
EOSINOPHIL NFR BLD AUTO: 2.2 % (ref 0.3–6.2)
ERYTHROCYTE [DISTWIDTH] IN BLOOD BY AUTOMATED COUNT: 15 % (ref 12.3–15.4)
GLOBULIN UR ELPH-MCNC: 3.2 GM/DL
GLUCOSE SERPL-MCNC: 161 MG/DL (ref 65–99)
GLUCOSE UR STRIP-MCNC: NEGATIVE MG/DL
HCT VFR BLD AUTO: 39.3 % (ref 34–46.6)
HGB BLD-MCNC: 13.2 G/DL (ref 12–15.9)
HGB UR QL STRIP.AUTO: NEGATIVE
HOLD SPECIMEN: NORMAL
KETONES UR QL STRIP: NEGATIVE
LEUKOCYTE ESTERASE UR QL STRIP.AUTO: NEGATIVE
LYMPHOCYTES # BLD AUTO: 3.3 10*3/MM3 (ref 0.7–3.1)
LYMPHOCYTES NFR BLD AUTO: 34.8 % (ref 19.6–45.3)
MCH RBC QN AUTO: 29.3 PG (ref 26.6–33)
MCHC RBC AUTO-ENTMCNC: 33.6 G/DL (ref 31.5–35.7)
MCV RBC AUTO: 87.1 FL (ref 79–97)
MONOCYTES # BLD AUTO: 0.5 10*3/MM3 (ref 0.1–0.9)
MONOCYTES NFR BLD AUTO: 5.6 % (ref 5–12)
NEUTROPHILS NFR BLD AUTO: 5.4 10*3/MM3 (ref 1.7–7)
NEUTROPHILS NFR BLD AUTO: 56.3 % (ref 42.7–76)
NITRITE UR QL STRIP: NEGATIVE
NRBC BLD AUTO-RTO: 0.1 /100 WBC (ref 0–0.2)
PH UR STRIP.AUTO: <=5 [PH] (ref 5–8)
PLATELET # BLD AUTO: 263 10*3/MM3 (ref 140–450)
PMV BLD AUTO: 8.2 FL (ref 6–12)
POTASSIUM SERPL-SCNC: 3.8 MMOL/L (ref 3.5–5.2)
PROT SERPL-MCNC: 7.5 G/DL (ref 6–8.5)
PROT UR QL STRIP: NEGATIVE
RBC # BLD AUTO: 4.51 10*6/MM3 (ref 3.77–5.28)
SODIUM SERPL-SCNC: 137 MMOL/L (ref 136–145)
SP GR UR STRIP: 1.01 (ref 1–1.03)
UROBILINOGEN UR QL STRIP: NORMAL
WBC NRBC COR # BLD: 9.6 10*3/MM3 (ref 3.4–10.8)
WHOLE BLOOD HOLD COAG: NORMAL
WHOLE BLOOD HOLD SPECIMEN: NORMAL

## 2023-09-28 PROCEDURE — 81003 URINALYSIS AUTO W/O SCOPE: CPT | Performed by: EMERGENCY MEDICINE

## 2023-09-28 PROCEDURE — 85025 COMPLETE CBC W/AUTO DIFF WBC: CPT | Performed by: EMERGENCY MEDICINE

## 2023-09-28 PROCEDURE — 25010000002 ONDANSETRON PER 1 MG: Performed by: EMERGENCY MEDICINE

## 2023-09-28 PROCEDURE — 96374 THER/PROPH/DIAG INJ IV PUSH: CPT

## 2023-09-28 PROCEDURE — 99284 EMERGENCY DEPT VISIT MOD MDM: CPT

## 2023-09-28 PROCEDURE — 80053 COMPREHEN METABOLIC PANEL: CPT | Performed by: EMERGENCY MEDICINE

## 2023-09-28 PROCEDURE — 96375 TX/PRO/DX INJ NEW DRUG ADDON: CPT

## 2023-09-28 PROCEDURE — 25010000002 HYDROMORPHONE 1 MG/ML SOLUTION: Performed by: EMERGENCY MEDICINE

## 2023-09-28 PROCEDURE — 74176 CT ABD & PELVIS W/O CONTRAST: CPT

## 2023-09-28 RX ORDER — ONDANSETRON 8 MG/1
8 TABLET, ORALLY DISINTEGRATING ORAL EVERY 8 HOURS PRN
Qty: 12 TABLET | Refills: 0 | Status: SHIPPED | OUTPATIENT
Start: 2023-09-28

## 2023-09-28 RX ORDER — ONDANSETRON 2 MG/ML
4 INJECTION INTRAMUSCULAR; INTRAVENOUS ONCE
Status: COMPLETED | OUTPATIENT
Start: 2023-09-28 | End: 2023-09-28

## 2023-09-28 RX ORDER — TRAMADOL HYDROCHLORIDE 50 MG/1
50 TABLET ORAL EVERY 6 HOURS PRN
Qty: 10 TABLET | Refills: 0 | Status: SHIPPED | OUTPATIENT
Start: 2023-09-28

## 2023-09-28 RX ADMIN — ONDANSETRON 4 MG: 2 INJECTION INTRAMUSCULAR; INTRAVENOUS at 20:34

## 2023-09-28 RX ADMIN — HYDROMORPHONE HYDROCHLORIDE 0.5 MG: 1 INJECTION, SOLUTION INTRAMUSCULAR; INTRAVENOUS; SUBCUTANEOUS at 20:33

## 2023-09-28 RX ADMIN — SODIUM CHLORIDE 500 ML: 9 INJECTION, SOLUTION INTRAVENOUS at 20:33

## 2023-09-29 NOTE — DISCHARGE INSTRUCTIONS
Plenty of fluids next 24 hours  Recheck liver function blood test in 1 week  No heavy lifting bending stooping or squatting for the next 3 days  Follow-up with your primary care provider

## 2023-09-29 NOTE — CASE MANAGEMENT/SOCIAL WORK
Continued Stay Note  JAQUELINE Salmon     Patient Name: Simran Wall  MRN: [id number]  Today's Date: 9/29/2023    Admit Date: 9/28/2023        Discharge Plan       Row Name 09/29/23 1206       Plan    Plan Comments Transportation issue, pt waiting in ER waiting area with no ride. Spoke with pt to verify correct transport address, which is home address on chart. Transport arranged with [employer], trip ID # [id number].                            Neema Leiva RN

## 2023-09-29 NOTE — ED PROVIDER NOTES
"Subjective   History of Present Illness  53-year-old female complaint of flank pain \"just like when I had a kidney stone\" the patient was recently discharged from [facility].  She states she was discharged on a muscle relaxant and has taken 1 but has not had resolution of discomfort.  She reports no sciatic component to the pain.  She states the pain is mostly in the right flank.  She reports no recent change in bowel or bladder habits and denies hematuria.  She reports no areas of anesthesia    Review of Systems   Gastrointestinal:  Positive for nausea.   Genitourinary:  Positive for flank pain.     Past Medical History:   Diagnosis Date    Diabetes mellitus     Hypertension        Allergies   Allergen Reactions    Cephalexin Unknown (See Comments)    Ibuprofen Unknown (See Comments)       No past surgical history on file.    No family history on file.    Social History     Socioeconomic History    Marital status:    Tobacco Use    Smoking status: Never     Passive exposure: Never    Smokeless tobacco: Never   Vaping Use    Vaping Use: Never used   Substance and Sexual Activity    Alcohol use: Never    Drug use: Yes     Types: Methamphetamines     Comment: Pt states no drug use, tox screen positive amphetamines    Sexual activity: Defer           Objective   Physical Exam  Alert [place] Coma Scale 15   HEENT: Pupils equal and reactive to light. Conjunctivae are not injected. Normal tympanic membranes. Oropharynx and nares are normal.   Neck: Supple. Midline trachea. No JVD. No goiter.   Chest: Clear and equal breath sounds bilaterally, regular rate and rhythm without murmur or rub.   Abdomen: Positive bowel sounds, nontender, nondistended. No rebound or peritoneal signs. No CVA tenderness.   Extremities no clubbing. cyanosis or edema. Motor sensory exam is normal. The full range of motion is intact   Skin: Warm and dry, no rashes or petechia.   Lymphatic: No regional lymphadenopathy. No calf pain, swelling " or Homans sign    Procedures           ED Course  ED Course as of 09/28/23 2256   Thu Sep 28, 2023   2024 Overflow ER conditions [TH]      ED Course User Index  [TH] Gavin Michelle MD      Labs Reviewed   COMPREHENSIVE METABOLIC PANEL - Abnormal; Notable for the following components:       Result Value    Glucose 161 (*)     Calcium 11.0 (*)     ALT (SGPT) 53 (*)     AST (SGOT) 39 (*)     Alkaline Phosphatase 139 (*)     All other components within normal limits    Narrative:     GFR Normal >60  Chronic Kidney Disease <60  Kidney Failure <15     CBC WITH AUTO DIFFERENTIAL - Abnormal; Notable for the following components:    Lymphocytes, Absolute 3.30 (*)     All other components within normal limits   URINALYSIS W/ CULTURE IF INDICATED - Normal    Narrative:     In absence of clinical symptoms, the presence of pyuria, bacteria, and/or nitrites on the urinalysis result does not correlate with infection.  Urine microscopic not indicated.   RAINBOW DRAW    Narrative:     The following orders were created for panel order [place] Draw.  Procedure                               Abnormality         Status                     ---------                               -----------         ------                     Green Top (Gel)[[id number]]                                                             Lavender Top[[id number]]                                     Final result               Gold Top - SST[[id number]]                                   Final result               Light Blue Top[[id number]]                                   Final result                 Please view results for these tests on the individual orders.   LAVENDER TOP   GOLD TOP - SST   LIGHT BLUE TOP   CBC AND DIFFERENTIAL    Narrative:     The following orders were created for panel order CBC & Differential.  Procedure                               Abnormality         Status                     ---------                               -----------          ------                     CBC Auto Differential[[id number]]        Abnormal            Final result                 Please view results for these tests on the individual orders.     Medications   sodium chloride 0.9 % bolus 500 mL (0 mL Intravenous Stopped 9/28/23 2226)   ondansetron (ZOFRAN) injection 4 mg (4 mg Intravenous Given 9/28/23 2034)   HYDROmorphone (DILAUDID) injection 0.5 mg (0.5 mg Intravenous Given 9/28/23 2033)     CT Abdomen Pelvis Without Contrast    Result Date: 9/28/2023  Impression: No evidence of acute intra-abdominal abnormality. Age indeterminate superior endplate compression deformity at L3 may represent a Schmorl's node or a nondisplaced fracture. No retropulsion. Recommend correlation with any focal pain/tenderness. Electronically Signed: Zain Flanagan MD  9/28/2023 10:16 PM EDT  Workstation ID: [id number]                                        Medical Decision Making  The patient had normal liver functions 1 month ago.  The possibly recently passed stone exists although there is no residual hydroureter noted.  The patient will be placed on tramadol and Zofran.  The patient was advised to repeat her liver functions in 1 week.  The patient was stable at discharge and vocalized understanding of discharge instructions and warnings    Amount and/or Complexity of Data Reviewed  Independent Historian: EMS  External Data Reviewed: notes.     Details: Discharge medication list from [facility]  Labs: ordered. Decision-making details documented in ED Course.  Radiology: ordered and independent interpretation performed.    Risk  Prescription drug management.  Parenteral controlled substances.        Final diagnoses:   Acute right flank pain   Abnormal liver function test       ED Disposition  ED Disposition       ED Disposition   Discharge    Condition   Stable    Comment   --               No follow-up provider specified.       Medication List      No changes were made to your prescriptions during  this visit.            Gavin Michelle MD  09/28/23 0172

## 2023-10-26 ENCOUNTER — HOSPITAL ENCOUNTER (EMERGENCY)
Facility: HOSPITAL | Age: 53
Discharge: HOME | End: 2023-10-26
Attending: EMERGENCY MEDICINE
Payer: MEDICARE

## 2023-10-26 VITALS
RESPIRATION RATE: 18 BRPM | BODY MASS INDEX: 41.38 KG/M2 | SYSTOLIC BLOOD PRESSURE: 121 MMHG | HEIGHT: 62 IN | WEIGHT: 224.87 LBS | HEART RATE: 112 BPM | OXYGEN SATURATION: 96 % | TEMPERATURE: 98.8 F | DIASTOLIC BLOOD PRESSURE: 74 MMHG

## 2023-10-26 DIAGNOSIS — T50.901A ACCIDENTAL OVERDOSE, INITIAL ENCOUNTER: Primary | ICD-10-CM

## 2023-10-26 LAB
ANION GAP SERPL CALCULATED.3IONS-SCNC: 18 MMOL/L (ref 5–15)
BASOPHILS # BLD AUTO: 0.1 10*3/MM3 (ref 0–0.2)
BASOPHILS NFR BLD AUTO: 0.4 % (ref 0–1.5)
BUN SERPL-MCNC: 11 MG/DL (ref 6–20)
BUN/CREAT SERPL: 11.8 (ref 7–25)
CALCIUM SPEC-SCNC: 10.1 MG/DL (ref 8.6–10.5)
CHLORIDE SERPL-SCNC: 96 MMOL/L (ref 98–107)
CO2 SERPL-SCNC: 24 MMOL/L (ref 22–29)
CREAT SERPL-MCNC: 0.93 MG/DL (ref 0.57–1)
DEPRECATED RDW RBC AUTO: 48.6 FL (ref 37–54)
EGFRCR SERPLBLD CKD-EPI 2021: 72.7 ML/MIN/1.73
EOSINOPHIL # BLD AUTO: 0 10*3/MM3 (ref 0–0.4)
EOSINOPHIL NFR BLD AUTO: 0.2 % (ref 0.3–6.2)
ERYTHROCYTE [DISTWIDTH] IN BLOOD BY AUTOMATED COUNT: 15.1 % (ref 12.3–15.4)
GLUCOSE SERPL-MCNC: 196 MG/DL (ref 65–99)
HCT VFR BLD AUTO: 38.1 % (ref 34–46.6)
HGB BLD-MCNC: 12.7 G/DL (ref 12–15.9)
LYMPHOCYTES # BLD AUTO: 2.9 10*3/MM3 (ref 0.7–3.1)
LYMPHOCYTES NFR BLD AUTO: 24.8 % (ref 19.6–45.3)
MAGNESIUM SERPL-MCNC: 1.6 MG/DL (ref 1.6–2.6)
MCH RBC QN AUTO: 29.2 PG (ref 26.6–33)
MCHC RBC AUTO-ENTMCNC: 33.5 G/DL (ref 31.5–35.7)
MCV RBC AUTO: 87.2 FL (ref 79–97)
MONOCYTES # BLD AUTO: 0.9 10*3/MM3 (ref 0.1–0.9)
MONOCYTES NFR BLD AUTO: 8.1 % (ref 5–12)
NEUTROPHILS NFR BLD AUTO: 66.5 % (ref 42.7–76)
NEUTROPHILS NFR BLD AUTO: 7.8 10*3/MM3 (ref 1.7–7)
NRBC BLD AUTO-RTO: 0.1 /100 WBC (ref 0–0.2)
PLATELET # BLD AUTO: 286 10*3/MM3 (ref 140–450)
PMV BLD AUTO: 7.8 FL (ref 6–12)
POTASSIUM SERPL-SCNC: 3.4 MMOL/L (ref 3.5–5.2)
RBC # BLD AUTO: 4.36 10*6/MM3 (ref 3.77–5.28)
SODIUM SERPL-SCNC: 138 MMOL/L (ref 136–145)
TSH SERPL DL<=0.05 MIU/L-ACNC: 44.27 UIU/ML (ref 0.27–4.2)
WBC NRBC COR # BLD: 11.7 10*3/MM3 (ref 3.4–10.8)

## 2023-10-26 PROCEDURE — 93005 ELECTROCARDIOGRAM TRACING: CPT | Performed by: EMERGENCY MEDICINE

## 2023-10-26 PROCEDURE — 99283 EMERGENCY DEPT VISIT LOW MDM: CPT

## 2023-10-26 PROCEDURE — 85025 COMPLETE CBC W/AUTO DIFF WBC: CPT | Performed by: EMERGENCY MEDICINE

## 2023-10-26 PROCEDURE — 80048 BASIC METABOLIC PNL TOTAL CA: CPT | Performed by: EMERGENCY MEDICINE

## 2023-10-26 PROCEDURE — 83735 ASSAY OF MAGNESIUM: CPT | Performed by: EMERGENCY MEDICINE

## 2023-10-26 PROCEDURE — 84443 ASSAY THYROID STIM HORMONE: CPT | Performed by: EMERGENCY MEDICINE

## 2023-10-26 RX ORDER — SODIUM CHLORIDE 0.9 % (FLUSH) 0.9 %
10 SYRINGE (ML) INJECTION AS NEEDED
Status: DISCONTINUED | OUTPATIENT
Start: 2023-10-26 | End: 2023-10-26 | Stop reason: HOSPADM

## 2023-10-26 RX ADMIN — Medication 10 ML: at 04:56

## 2023-10-26 NOTE — DISCHARGE INSTRUCTIONS
Take your medicines as prescribed.  Avoid mixing medications or taking too much medication.  Return for any concerns.

## 2023-10-26 NOTE — ED PROVIDER NOTES
Subjective   History of Present Illness  53-year-old female states she was nervous because she thinks she may have taken an extra tramadol tonight.  She reports no chest pain or shortness of breath.  States she felt slightly lightheaded after taking the medication.  She reports taking the medication about 3 hours prior to arrival.  She reports no vomiting or diarrhea.  She states she takes tramadol for her kidney stone pain.  Review of Systems    Past Medical History:   Diagnosis Date    Diabetes mellitus     Hypertension        Allergies   Allergen Reactions    Cephalexin Unknown (See Comments)    Ibuprofen Unknown (See Comments)       No past surgical history on file.    No family history on file.    Social History     Socioeconomic History    Marital status:    Tobacco Use    Smoking status: Never     Passive exposure: Never    Smokeless tobacco: Never   Vaping Use    Vaping Use: Never used   Substance and Sexual Activity    Alcohol use: Never    Drug use: Yes     Types: Methamphetamines     Comment: Pt states no drug use, tox screen positive amphetamines    Sexual activity: Defer       Prior to Admission medications    Medication Sig Start Date End Date Taking? Authorizing Provider   busPIRone (BUSPAR) 5 MG tablet Take 1 tablet by mouth 3 (Three) Times a Day. 8/15/23   Jeremiah Saldana MD   levothyroxine (SYNTHROID, LEVOTHROID) 50 MCG tablet Take 1 tablet by mouth Every Morning. 8/3/23   Roque Galarza MD   lisinopril (PRINIVIL,ZESTRIL) 5 MG tablet Take 1 tablet by mouth Daily. 8/3/23   Roque Galarza MD   ondansetron ODT (ZOFRAN-ODT) 8 MG disintegrating tablet Place 1 tablet on the tongue Every 8 (Eight) Hours As Needed for Nausea or Vomiting. 9/28/23   Gavin Michelle MD   traMADol (ULTRAM) 50 MG tablet Take 1 tablet by mouth Every 6 (Six) Hours As Needed for Moderate Pain or Severe Pain. 9/28/23   Gavin Michelle MD     /80 (BP Location: Right arm, Patient Position: Lying)   Pulse 112   Temp  "98.8 °F (37.1 °C)   Resp 19   Ht 157.5 cm (62\")   Wt 102 kg (224 lb 13.9 oz)   SpO2 93%   BMI 41.13 kg/m²       Objective   Physical Exam  General: Well-developed well-appearing, no acute distress, alert and appropriate  Eyes: Pupils round and reactive, sclera nonicteric  HEENT: Mucous membranes moist, no mucosal swelling  Neck: Supple, no nuchal rigidity, no JVD  Respirations: Respirations nonlabored, equal breath sounds bilaterally, clear lungs  Heart creased rate, regular rhythm, no murmurs rubs or gallops,   Abdomen soft nontender nondistended, no hepatosplenomegaly, no hernia, no mass, normal bowel sounds, no CVA tenderness  Extremities no clubbing cyanosis or edema, calves are symmetric and nontender  Neuro cranial nerves grossly intact, no focal limb deficits  Psych oriented, pleasant affect  Skin no rash, brisk cap refill  Procedures           ED Course      Results for orders placed or performed during the hospital encounter of 10/26/23   Basic Metabolic Panel    Specimen: Blood   Result Value Ref Range    Glucose 196 (H) 65 - 99 mg/dL    BUN 11 6 - 20 mg/dL    Creatinine 0.93 0.57 - 1.00 mg/dL    Sodium 138 136 - 145 mmol/L    Potassium 3.4 (L) 3.5 - 5.2 mmol/L    Chloride 96 (L) 98 - 107 mmol/L    CO2 24.0 22.0 - 29.0 mmol/L    Calcium 10.1 8.6 - 10.5 mg/dL    BUN/Creatinine Ratio 11.8 7.0 - 25.0    Anion Gap 18.0 (H) 5.0 - 15.0 mmol/L    eGFR 72.7 >60.0 mL/min/1.73   TSH    Specimen: Blood   Result Value Ref Range    TSH 44.270 (H) 0.270 - 4.200 uIU/mL   Magnesium    Specimen: Blood   Result Value Ref Range    Magnesium 1.6 1.6 - 2.6 mg/dL   CBC Auto Differential    Specimen: Blood   Result Value Ref Range    WBC 11.70 (H) 3.40 - 10.80 10*3/mm3    RBC 4.36 3.77 - 5.28 10*6/mm3    Hemoglobin 12.7 12.0 - 15.9 g/dL    Hematocrit 38.1 34.0 - 46.6 %    MCV 87.2 79.0 - 97.0 fL    MCH 29.2 26.6 - 33.0 pg    MCHC 33.5 31.5 - 35.7 g/dL    RDW 15.1 12.3 - 15.4 %    RDW-SD 48.6 37.0 - 54.0 fl    MPV 7.8 6.0 - " 12.0 fL    Platelets 286 140 - 450 10*3/mm3    Neutrophil % 66.5 42.7 - 76.0 %    Lymphocyte % 24.8 19.6 - 45.3 %    Monocyte % 8.1 5.0 - 12.0 %    Eosinophil % 0.2 (L) 0.3 - 6.2 %    Basophil % 0.4 0.0 - 1.5 %    Neutrophils, Absolute 7.80 (H) 1.70 - 7.00 10*3/mm3    Lymphocytes, Absolute 2.90 0.70 - 3.10 10*3/mm3    Monocytes, Absolute 0.90 0.10 - 0.90 10*3/mm3    Eosinophils, Absolute 0.00 0.00 - 0.40 10*3/mm3    Basophils, Absolute 0.10 0.00 - 0.20 10*3/mm3    nRBC 0.1 0.0 - 0.2 /100 WBC   ECG 12 Lead Dyspnea   Result Value Ref Range    QT Interval 339 ms    QTC Interval 483 ms                                          Medical Decision Making  Patient presents stating she thinks she took an extra dose of her 50 mg tramadol by mistake today.  She otherwise has no significant symptoms.  She was stable throughout the emergency room course.  She was up and ambulating about the emergency room without complaints.  She has some initial mild tachycardia that was improved on reexam.  Notably she is not describing any suicidal ideation or intentional overdose or self-harm.  She does have an elevated TSH which may indicate some hypothyroidism she is encouraged to follow-up with her primary care physician she is discharged good condition was given warning signs for return.    Problems Addressed:  Accidental overdose, initial encounter: complicated acute illness or injury    Amount and/or Complexity of Data Reviewed  Labs: ordered. Decision-making details documented in ED Course.     Details: CBC borderline leukocytosis, magnesium normal, Chem-7 borderline hypokalemia, hyperglycemia  ECG/medicine tests: ordered and independent interpretation performed.     Details: My EKG interpretation sinus rhythm rate of 120, left ventricular hypertrophy     Risk  Prescription drug management.        Final diagnoses:   Accidental overdose, initial encounter       ED Disposition  ED Disposition       ED Disposition   Discharge    Condition    Stable    Comment   --               No follow-up provider specified.       Medication List      No changes were made to your prescriptions during this visit.            Darryl Briggs MD  10/26/23 0605

## 2023-10-27 LAB
QT INTERVAL: 339 MS
QTC INTERVAL: 483 MS

## 2023-11-07 ENCOUNTER — HOSPITAL ENCOUNTER (EMERGENCY)
Facility: HOSPITAL | Age: 53
Discharge: HOME | End: 2023-11-07
Attending: EMERGENCY MEDICINE | Admitting: EMERGENCY MEDICINE
Payer: MEDICARE

## 2023-11-07 VITALS
WEIGHT: 231.48 LBS | SYSTOLIC BLOOD PRESSURE: 175 MMHG | TEMPERATURE: 100.1 F | HEIGHT: 62 IN | OXYGEN SATURATION: 96 % | BODY MASS INDEX: 42.6 KG/M2 | RESPIRATION RATE: 20 BRPM | DIASTOLIC BLOOD PRESSURE: 85 MMHG | HEART RATE: 115 BPM

## 2023-11-07 DIAGNOSIS — U07.1 COVID-19: Primary | ICD-10-CM

## 2023-11-07 LAB
FLUAV SUBTYP SPEC NAA+PROBE: NOT DETECTED
FLUBV RNA ISLT QL NAA+PROBE: NOT DETECTED
SARS-COV-2 RNA RESP QL NAA+PROBE: DETECTED

## 2023-11-07 PROCEDURE — 99282 EMERGENCY DEPT VISIT SF MDM: CPT

## 2023-11-07 PROCEDURE — 87636 SARSCOV2 & INF A&B AMP PRB: CPT | Performed by: EMERGENCY MEDICINE

## 2023-11-07 RX ORDER — ACETAMINOPHEN 500 MG
1000 TABLET ORAL ONCE
Status: DISCONTINUED | OUTPATIENT
Start: 2023-11-07 | End: 2023-11-07 | Stop reason: HOSPADM

## 2023-11-07 NOTE — ED PROVIDER NOTES
"Subjective   History of Present Illness  Chief complaint: Exposure to COVID    53-year-old female presents requesting a COVID test.  She states her neighbor tested positive for COVID and they have been sharing an e-cigarette.  Patient also apparently told the nurse she and her neighbor have \"snorted lines\" together.  Patient denies any specific complaints, however she did have a temperature of 100.6 during triage.  She denies cough or congestion.  She has had no vomiting or diarrhea.    History provided by:  Patient      Review of Systems   Constitutional:  Positive for fever.   HENT:  Negative for congestion.    Respiratory:  Negative for cough and shortness of breath.    Cardiovascular:  Negative for chest pain.   Gastrointestinal:  Negative for abdominal pain.   Musculoskeletal:  Negative for back pain.   Neurological:  Negative for headaches.       Past Medical History:   Diagnosis Date    Diabetes mellitus     Hypertension        Allergies   Allergen Reactions    Cephalexin Unknown (See Comments)    Ibuprofen Unknown (See Comments)       No past surgical history on file.    No family history on file.    Social History     Socioeconomic History    Marital status:    Tobacco Use    Smoking status: Never     Passive exposure: Never    Smokeless tobacco: Never   Vaping Use    Vaping Use: Never used   Substance and Sexual Activity    Alcohol use: Never    Drug use: Yes     Types: Methamphetamines     Comment: Pt states no drug use, tox screen positive amphetamines    Sexual activity: Defer       BP (!) 184/91   Pulse (!) 125   Temp (!) 100.6 °F (38.1 °C)   Resp 20   Ht 157.5 cm (62\")   Wt 105 kg (231 lb 7.7 oz)   SpO2 96%   BMI 42.34 kg/m²       Objective   Physical Exam  Vitals and nursing note reviewed.   Constitutional:       Appearance: Normal appearance.   HENT:      Head: Normocephalic and atraumatic.      Mouth/Throat:      Mouth: Mucous membranes are moist.   Cardiovascular:      Rate and " Rhythm: Normal rate and regular rhythm.      Heart sounds: Normal heart sounds.   Pulmonary:      Effort: Pulmonary effort is normal. No respiratory distress.      Breath sounds: Normal breath sounds.   Abdominal:      Palpations: Abdomen is soft.      Tenderness: There is no abdominal tenderness.   Skin:     General: Skin is warm and dry.   Neurological:      Mental Status: She is alert and oriented to person, place, and time.         Procedures           ED Course      Results for orders placed or performed during the hospital encounter of 11/07/23   COVID-19 and FLU A/B PCR - Swab, Nasopharynx    Specimen: Nasopharynx; Swab   Result Value Ref Range    COVID19 Detected (C) Not Detected - Ref. Range    Influenza A PCR Not Detected Not Detected    Influenza B PCR Not Detected Not Detected                                          Medical Decision Making    Patient did test positive for COVID.  She is well-appearing in the emergency room.  She is oxygenating well on room air.  I see no indication for admission at this time.  She will be discharged to follow-up with her primary doctor.      Final diagnoses:   COVID-19       ED Disposition  ED Disposition       ED Disposition   Discharge    Condition   Stable    Comment   --               John Martino MD  [address] [place] [address]  [phone]    Call in 2 days           Medication List      No changes were made to your prescriptions during this visit.            Santosh Briggs MD  11/07/23 0020

## 2023-11-07 NOTE — ED NOTES
"Patient states \"I snorted a couple lines and shared one of those E-Cigarettes with my neighbor in the [facility] and now she tested positive for Covid and I need to be tested because she is going to give it to everyone. I need the new vaccine, a test, and that pneumonia vaccine\" Patient was informed that we do not give the vaccines, but has been given informed of places that do. Patient is talking at a fast pace and having a hard time staying on topic, but told this nurse that is her baseline. Patient is cooperative and has been given a box lunch with large water and 2 small lemon lime umair's.   "

## 2024-08-05 ENCOUNTER — HOSPITAL ENCOUNTER (EMERGENCY)
Facility: HOSPITAL | Age: 54
Discharge: HOME | End: 2024-08-05
Attending: EMERGENCY MEDICINE | Admitting: EMERGENCY MEDICINE
Payer: MEDICARE

## 2024-08-05 ENCOUNTER — APPOINTMENT (OUTPATIENT)
Dept: CT IMAGING | Facility: HOSPITAL | Age: 54
End: 2024-08-05
Payer: MEDICARE

## 2024-08-05 VITALS
TEMPERATURE: 99.5 F | WEIGHT: 257.06 LBS | OXYGEN SATURATION: 96 % | SYSTOLIC BLOOD PRESSURE: 156 MMHG | RESPIRATION RATE: 23 BRPM | BODY MASS INDEX: 47.3 KG/M2 | DIASTOLIC BLOOD PRESSURE: 95 MMHG | HEART RATE: 105 BPM | HEIGHT: 62 IN

## 2024-08-05 DIAGNOSIS — M79.10 MYALGIA: ICD-10-CM

## 2024-08-05 DIAGNOSIS — E03.9 HYPOTHYROIDISM, UNSPECIFIED TYPE: ICD-10-CM

## 2024-08-05 DIAGNOSIS — N39.0 URINARY TRACT INFECTION WITHOUT HEMATURIA, SITE UNSPECIFIED: Primary | ICD-10-CM

## 2024-08-05 LAB
ACETONE BLD QL: NEGATIVE
ALBUMIN SERPL-MCNC: 5.1 G/DL (ref 3.5–5.2)
ALBUMIN/GLOB SERPL: 1.5 G/DL
ALP SERPL-CCNC: 110 U/L (ref 39–117)
ALT SERPL W P-5'-P-CCNC: 52 U/L (ref 1–33)
ANION GAP SERPL CALCULATED.3IONS-SCNC: 19.1 MMOL/L (ref 5–15)
AST SERPL-CCNC: 46 U/L (ref 1–32)
BACTERIA UR QL AUTO: ABNORMAL /HPF
BASOPHILS # BLD AUTO: 0.05 10*3/MM3 (ref 0–0.2)
BASOPHILS NFR BLD AUTO: 0.4 % (ref 0–1.5)
BILIRUB SERPL-MCNC: 0.3 MG/DL (ref 0–1.2)
BILIRUB UR QL STRIP: NEGATIVE
BUN SERPL-MCNC: 12 MG/DL (ref 6–20)
BUN/CREAT SERPL: 12 (ref 7–25)
CALCIUM SPEC-SCNC: 9.7 MG/DL (ref 8.6–10.5)
CHLORIDE SERPL-SCNC: 92 MMOL/L (ref 98–107)
CLARITY UR: CLEAR
CO2 SERPL-SCNC: 24.9 MMOL/L (ref 22–29)
COLOR UR: YELLOW
CREAT SERPL-MCNC: 1 MG/DL (ref 0.57–1)
DEPRECATED RDW RBC AUTO: 45.4 FL (ref 37–54)
EGFRCR SERPLBLD CKD-EPI 2021: 66.3 ML/MIN/1.73
EOSINOPHIL # BLD AUTO: 0.04 10*3/MM3 (ref 0–0.4)
EOSINOPHIL NFR BLD AUTO: 0.3 % (ref 0.3–6.2)
ERYTHROCYTE [DISTWIDTH] IN BLOOD BY AUTOMATED COUNT: 13.3 % (ref 12.3–15.4)
GLOBULIN UR ELPH-MCNC: 3.3 GM/DL
GLUCOSE BLDC GLUCOMTR-MCNC: 350 MG/DL (ref 70–105)
GLUCOSE SERPL-MCNC: 375 MG/DL (ref 65–99)
GLUCOSE UR STRIP-MCNC: ABNORMAL MG/DL
HCT VFR BLD AUTO: 42.1 % (ref 34–46.6)
HGB BLD-MCNC: 13.9 G/DL (ref 12–15.9)
HGB UR QL STRIP.AUTO: NEGATIVE
HYALINE CASTS UR QL AUTO: ABNORMAL /LPF
IMM GRANULOCYTES # BLD AUTO: 0.12 10*3/MM3 (ref 0–0.05)
IMM GRANULOCYTES NFR BLD AUTO: 1 % (ref 0–0.5)
KETONES UR QL STRIP: ABNORMAL
LEUKOCYTE ESTERASE UR QL STRIP.AUTO: NEGATIVE
LYMPHOCYTES # BLD AUTO: 3.78 10*3/MM3 (ref 0.7–3.1)
LYMPHOCYTES NFR BLD AUTO: 30.6 % (ref 19.6–45.3)
MAGNESIUM SERPL-MCNC: 1.7 MG/DL (ref 1.6–2.6)
MCH RBC QN AUTO: 30.3 PG (ref 26.6–33)
MCHC RBC AUTO-ENTMCNC: 33 G/DL (ref 31.5–35.7)
MCV RBC AUTO: 91.7 FL (ref 79–97)
MONOCYTES # BLD AUTO: 0.62 10*3/MM3 (ref 0.1–0.9)
MONOCYTES NFR BLD AUTO: 5 % (ref 5–12)
NEUTROPHILS NFR BLD AUTO: 62.7 % (ref 42.7–76)
NEUTROPHILS NFR BLD AUTO: 7.74 10*3/MM3 (ref 1.7–7)
NITRITE UR QL STRIP: NEGATIVE
NRBC BLD AUTO-RTO: 0 /100 WBC (ref 0–0.2)
PH UR STRIP.AUTO: 5.5 [PH] (ref 5–8)
PLATELET # BLD AUTO: 244 10*3/MM3 (ref 140–450)
PMV BLD AUTO: 10.9 FL (ref 6–12)
POTASSIUM SERPL-SCNC: 3.6 MMOL/L (ref 3.5–5.2)
PROT SERPL-MCNC: 8.4 G/DL (ref 6–8.5)
PROT UR QL STRIP: ABNORMAL
RBC # BLD AUTO: 4.59 10*6/MM3 (ref 3.77–5.28)
RBC # UR STRIP: ABNORMAL /HPF
REF LAB TEST METHOD: ABNORMAL
SODIUM SERPL-SCNC: 136 MMOL/L (ref 136–145)
SP GR UR STRIP: 1.04 (ref 1–1.03)
SQUAMOUS #/AREA URNS HPF: ABNORMAL /HPF
TSH SERPL DL<=0.05 MIU/L-ACNC: 53.8 UIU/ML (ref 0.27–4.2)
UROBILINOGEN UR QL STRIP: ABNORMAL
WBC # UR STRIP: ABNORMAL /HPF
WBC NRBC COR # BLD AUTO: 12.35 10*3/MM3 (ref 3.4–10.8)
YEAST URNS QL MICRO: ABNORMAL /HPF

## 2024-08-05 PROCEDURE — 25010000002 LEVOFLOXACIN PER 250 MG: Performed by: EMERGENCY MEDICINE

## 2024-08-05 PROCEDURE — 82948 REAGENT STRIP/BLOOD GLUCOSE: CPT

## 2024-08-05 PROCEDURE — 25010000002 MORPHINE PER 10 MG: Performed by: EMERGENCY MEDICINE

## 2024-08-05 PROCEDURE — 99284 EMERGENCY DEPT VISIT MOD MDM: CPT

## 2024-08-05 PROCEDURE — 25810000003 SODIUM CHLORIDE 0.9 % SOLUTION: Performed by: EMERGENCY MEDICINE

## 2024-08-05 PROCEDURE — 82009 KETONE BODYS QUAL: CPT | Performed by: NURSE PRACTITIONER

## 2024-08-05 PROCEDURE — 25010000002 ONDANSETRON PER 1 MG: Performed by: EMERGENCY MEDICINE

## 2024-08-05 PROCEDURE — 84443 ASSAY THYROID STIM HORMONE: CPT | Performed by: NURSE PRACTITIONER

## 2024-08-05 PROCEDURE — 96375 TX/PRO/DX INJ NEW DRUG ADDON: CPT

## 2024-08-05 PROCEDURE — 83735 ASSAY OF MAGNESIUM: CPT | Performed by: NURSE PRACTITIONER

## 2024-08-05 PROCEDURE — 81001 URINALYSIS AUTO W/SCOPE: CPT | Performed by: NURSE PRACTITIONER

## 2024-08-05 PROCEDURE — 70450 CT HEAD/BRAIN W/O DYE: CPT

## 2024-08-05 PROCEDURE — 80053 COMPREHEN METABOLIC PANEL: CPT | Performed by: NURSE PRACTITIONER

## 2024-08-05 PROCEDURE — 36415 COLL VENOUS BLD VENIPUNCTURE: CPT

## 2024-08-05 PROCEDURE — 96365 THER/PROPH/DIAG IV INF INIT: CPT

## 2024-08-05 PROCEDURE — 85025 COMPLETE CBC W/AUTO DIFF WBC: CPT | Performed by: NURSE PRACTITIONER

## 2024-08-05 PROCEDURE — 87086 URINE CULTURE/COLONY COUNT: CPT | Performed by: NURSE PRACTITIONER

## 2024-08-05 RX ORDER — MORPHINE SULFATE 2 MG/ML
2 INJECTION, SOLUTION INTRAMUSCULAR; INTRAVENOUS ONCE
Status: COMPLETED | OUTPATIENT
Start: 2024-08-05 | End: 2024-08-05

## 2024-08-05 RX ORDER — LEVOTHYROXINE SODIUM 0.05 MG/1
50 TABLET ORAL DAILY
Qty: 30 TABLET | Refills: 0 | Status: SHIPPED | OUTPATIENT
Start: 2024-08-05

## 2024-08-05 RX ORDER — ONDANSETRON 2 MG/ML
4 INJECTION INTRAMUSCULAR; INTRAVENOUS ONCE
Status: COMPLETED | OUTPATIENT
Start: 2024-08-05 | End: 2024-08-05

## 2024-08-05 RX ORDER — SODIUM CHLORIDE 0.9 % (FLUSH) 0.9 %
10 SYRINGE (ML) INJECTION AS NEEDED
Status: DISCONTINUED | OUTPATIENT
Start: 2024-08-05 | End: 2024-08-06 | Stop reason: HOSPADM

## 2024-08-05 RX ORDER — NITROFURANTOIN 25; 75 MG/1; MG/1
100 CAPSULE ORAL 2 TIMES DAILY
Qty: 10 CAPSULE | Refills: 0 | Status: SHIPPED | OUTPATIENT
Start: 2024-08-05

## 2024-08-05 RX ORDER — TRAMADOL HYDROCHLORIDE 50 MG/1
50 TABLET ORAL EVERY 6 HOURS PRN
Qty: 12 TABLET | Refills: 0 | Status: SHIPPED | OUTPATIENT
Start: 2024-08-05

## 2024-08-05 RX ORDER — ONDANSETRON 4 MG/1
4 TABLET, ORALLY DISINTEGRATING ORAL EVERY 8 HOURS PRN
Qty: 12 TABLET | Refills: 0 | Status: SHIPPED | OUTPATIENT
Start: 2024-08-05

## 2024-08-05 RX ORDER — LEVOFLOXACIN 5 MG/ML
750 INJECTION, SOLUTION INTRAVENOUS ONCE
Status: COMPLETED | OUTPATIENT
Start: 2024-08-05 | End: 2024-08-05

## 2024-08-05 RX ADMIN — MORPHINE SULFATE 2 MG: 2 INJECTION, SOLUTION INTRAMUSCULAR; INTRAVENOUS at 23:37

## 2024-08-05 RX ADMIN — ONDANSETRON 4 MG: 2 INJECTION INTRAMUSCULAR; INTRAVENOUS at 21:40

## 2024-08-05 RX ADMIN — SODIUM CHLORIDE 500 ML: 9 INJECTION, SOLUTION INTRAVENOUS at 21:42

## 2024-08-05 RX ADMIN — LEVOFLOXACIN 750 MG: 5 INJECTION, SOLUTION INTRAVENOUS at 21:40

## 2024-08-05 NOTE — ED NOTES
Patient evaluated by provider and will return to waiting room with Intravenous line in place.  Patient has been instructed not to inject anything into IV, or leave premises with line in place. Patient pending further evaluation, treatment, testing / monitoring.

## 2024-08-05 NOTE — ED PROVIDER NOTES
Subjective   Provider in Triage Note  Sent from urgent care for elevated blood pressures and blood sugars.  She is not currently on any medication for her diabetes.    Due to significant overcrowding in the emergency department patient was initially seen and evaluated in triage.  Provider in triage recommended patient placement in the treatment area to initiate therapy and movement to an ER bed as soon as possible.   Orders placed; medications will be deferred to main provider per protocol.        History of Present Illness  I interviewed the patient HPI and agree with the nurse practitioner provided triage note as noted above  Review of Systems    Past Medical History:   Diagnosis Date    Diabetes mellitus     Hypertension     Kidney stone        Allergies   Allergen Reactions    Cephalexin Unknown (See Comments)    Ibuprofen Unknown (See Comments)    Penicillins Anaphylaxis       Past Surgical History:   Procedure Laterality Date    CHOLECYSTECTOMY      CYSTOSCOPY W/ LASER LITHOTRIPSY      HYSTERECTOMY      TUBAL ABDOMINAL LIGATION      URETERAL STENT INSERTION Right        No family history on file.    Social History     Socioeconomic History    Marital status:    Tobacco Use    Smoking status: Every Day     Current packs/day: 0.50     Types: Cigarettes     Passive exposure: Never    Smokeless tobacco: Never   Vaping Use    Vaping status: Never Used   Substance and Sexual Activity    Alcohol use: Never    Drug use: Not Currently     Types: Methamphetamines     Comment: Pt states no drug use, tox screen positive amphetamines    Sexual activity: Defer           Objective   Physical Exam  Neck has no adenopathy JVD or bruits.  Lungs are clear.  Heart has regular rate rhythm without murmur rub or gallop.  Chest is nontender.  Ab soft nontender.  Extremity exam unremarkable.  Procedures           ED Course      Results for orders placed or performed during the hospital encounter of 08/05/24   Comprehensive  Metabolic Panel    Specimen: Blood   Result Value Ref Range    Glucose 375 (H) 65 - 99 mg/dL    BUN 12 6 - 20 mg/dL    Creatinine 1.00 0.57 - 1.00 mg/dL    Sodium 136 136 - 145 mmol/L    Potassium 3.6 3.5 - 5.2 mmol/L    Chloride 92 (L) 98 - 107 mmol/L    CO2 24.9 22.0 - 29.0 mmol/L    Calcium 9.7 8.6 - 10.5 mg/dL    Total Protein 8.4 6.0 - 8.5 g/dL    Albumin 5.1 3.5 - 5.2 g/dL    ALT (SGPT) 52 (H) 1 - 33 U/L    AST (SGOT) 46 (H) 1 - 32 U/L    Alkaline Phosphatase 110 39 - 117 U/L    Total Bilirubin 0.3 0.0 - 1.2 mg/dL    Globulin 3.3 gm/dL    A/G Ratio 1.5 g/dL    BUN/Creatinine Ratio 12.0 7.0 - 25.0    Anion Gap 19.1 (H) 5.0 - 15.0 mmol/L    eGFR 66.3 >60.0 mL/min/1.73   Urinalysis With Culture If Indicated - Urine, Clean Catch    Specimen: Urine, Clean Catch   Result Value Ref Range    Color, UA Yellow Yellow, Straw    Appearance, UA Clear Clear    pH, UA 5.5 5.0 - 8.0    Specific Gravity, UA 1.040 (H) 1.005 - 1.030    Glucose, UA >=1000 mg/dL (3+) (A) Negative    Ketones, UA Trace (A) Negative    Bilirubin, UA Negative Negative    Blood, UA Negative Negative    Protein,  mg/dL (2+) (A) Negative    Leuk Esterase, UA Negative Negative    Nitrite, UA Negative Negative    Urobilinogen, UA 0.2 E.U./dL 0.2 - 1.0 E.U./dL   TSH    Specimen: Blood   Result Value Ref Range    TSH 53.800 (H) 0.270 - 4.200 uIU/mL   Magnesium    Specimen: Blood   Result Value Ref Range    Magnesium 1.7 1.6 - 2.6 mg/dL   CBC Auto Differential    Specimen: Blood   Result Value Ref Range    WBC 12.35 (H) 3.40 - 10.80 10*3/mm3    RBC 4.59 3.77 - 5.28 10*6/mm3    Hemoglobin 13.9 12.0 - 15.9 g/dL    Hematocrit 42.1 34.0 - 46.6 %    MCV 91.7 79.0 - 97.0 fL    MCH 30.3 26.6 - 33.0 pg    MCHC 33.0 31.5 - 35.7 g/dL    RDW 13.3 12.3 - 15.4 %    RDW-SD 45.4 37.0 - 54.0 fl    MPV 10.9 6.0 - 12.0 fL    Platelets 244 140 - 450 10*3/mm3    Neutrophil % 62.7 42.7 - 76.0 %    Lymphocyte % 30.6 19.6 - 45.3 %    Monocyte % 5.0 5.0 - 12.0 %    Eosinophil  % 0.3 0.3 - 6.2 %    Basophil % 0.4 0.0 - 1.5 %    Immature Grans % 1.0 (H) 0.0 - 0.5 %    Neutrophils, Absolute 7.74 (H) 1.70 - 7.00 10*3/mm3    Lymphocytes, Absolute 3.78 (H) 0.70 - 3.10 10*3/mm3    Monocytes, Absolute 0.62 0.10 - 0.90 10*3/mm3    Eosinophils, Absolute 0.04 0.00 - 0.40 10*3/mm3    Basophils, Absolute 0.05 0.00 - 0.20 10*3/mm3    Immature Grans, Absolute 0.12 (H) 0.00 - 0.05 10*3/mm3    nRBC 0.0 0.0 - 0.2 /100 WBC   Acetone    Specimen: Blood   Result Value Ref Range    Acetone Negative Negative   Urinalysis, Microscopic Only - Urine, Clean Catch    Specimen: Urine, Clean Catch   Result Value Ref Range    RBC, UA 0-2 None Seen, 0-2 /HPF    WBC, UA 11-20 (A) None Seen, 0-2 /HPF    Bacteria, UA 2+ (A) None Seen /HPF    Squamous Epithelial Cells, UA 3-6 (A) None Seen, 0-2 /HPF    Yeast, UA Small/1+ Budding Yeast (A) None Seen /HPF    Hyaline Casts, UA 0-2 None Seen /LPF    Methodology Manual Light Microscopy    POC Glucose Once    Specimen: Blood   Result Value Ref Range    Glucose 350 (H) 70 - 105 mg/dL     CT Head Without Contrast    Result Date: 8/5/2024  Impression: No acute intracranial abnormality.. Electronically Signed: Gokul Archer  8/5/2024 7:18 PM EDT  Workstation ID: [id number]                                            Medical Decision Making  BMP shows no renal insufficiency or electrolyte abnormality.  Glucose is 375.  LFTs are normal with no evidence hepatitis.  UA has 2+ bacteria.  Urine culture is obtained.  TSH is 53.8.  Patient states he has not taken thyroid medication in several months because she had not have a doctor to refill it.  She has no leukocytosis no left shift and no anemia.  Patient given IV fluids as well as morphine Zofran and Rocephin.  Patient will be discharged with a prescription for Macrobid Zofran Ultram and Synthroid.  She will see MD for recheck.    Amount and/or Complexity of Data Reviewed  Labs: ordered. Decision-making details documented in ED  Course.  Radiology: ordered and independent interpretation performed.    Risk  Prescription drug management.        Final diagnoses:   Urinary tract infection without hematuria, site unspecified   Myalgia   Hypothyroidism, unspecified type       ED Disposition  ED Disposition       ED Disposition   Discharge    Condition   Stable    Comment   --               No follow-up provider specified.       Medication List        New Prescriptions      nitrofurantoin (macrocrystal-monohydrate) 100 MG capsule  Commonly known as: MACROBID  Take 1 capsule by mouth 2 (Two) Times a Day.            Changed      * levothyroxine 50 MCG tablet  Commonly known as: SYNTHROID, LEVOTHROID  Take 1 tablet by mouth Every Morning.  What changed: Another medication with the same name was added. Make sure you understand how and when to take each.     * levothyroxine 50 MCG tablet  Commonly known as: Synthroid  Take 1 tablet by mouth Daily.  What changed: You were already taking a medication with the same name, and this prescription was added. Make sure you understand how and when to take each.     * ondansetron ODT 8 MG disintegrating tablet  Commonly known as: ZOFRAN-ODT  Place 1 tablet on the tongue Every 8 (Eight) Hours As Needed for Nausea or Vomiting.  What changed: Another medication with the same name was added. Make sure you understand how and when to take each.     * ondansetron ODT 4 MG disintegrating tablet  Commonly known as: ZOFRAN-ODT  Place 1 tablet on the tongue Every 8 (Eight) Hours As Needed for Vomiting.  What changed: You were already taking a medication with the same name, and this prescription was added. Make sure you understand how and when to take each.     * traMADol 50 MG tablet  Commonly known as: ULTRAM  Take 1 tablet by mouth Every 6 (Six) Hours As Needed for Moderate Pain or Severe Pain.  What changed: Another medication with the same name was added. Make sure you understand how and when to take each.     *  traMADol 50 MG tablet  Commonly known as: ULTRAM  Take 1 tablet by mouth Every 6 (Six) Hours As Needed for Severe Pain.  What changed: You were already taking a medication with the same name, and this prescription was added. Make sure you understand how and when to take each.           * This list has 6 medication(s) that are the same as other medications prescribed for you. Read the directions carefully, and ask your doctor or other care provider to review them with you.                   Where to Get Your Medications        These medications were sent to [facility]/pharmacy #[id number] - [facility] [place] - [address] [facility] - [phone] [facility] [phone]   [address] [facility] [place] [address]      Phone: [phone]   levothyroxine 50 MCG tablet  nitrofurantoin (macrocrystal-monohydrate) 100 MG capsule  ondansetron ODT 4 MG disintegrating tablet  traMADol 50 MG tablet            Ross Palm MD  08/05/24 1898

## 2024-08-07 LAB — BACTERIA SPEC AEROBE CULT: NORMAL

## 2025-04-03 NOTE — PLAN OF CARE
"Goal Outcome Evaluation:  Plan of Care Reviewed With: patient           Outcome Evaluation: Pt is a 54 y/o female who came to ED stating \"I'm scared\".  Pt (+) UTI, (+) meth.  Pt with recent hospitalization secondary to meth induced delirium with hx of mental illness.  CT head: (-) acute.  Pt reports she lives alone in a mobile home wtih 3 steps to enter into home.  Pt was independent with ambulation and did not use an assitive device prior to hospitalization.  Pt on room air this date and not oriented to place.  Pt required supervision with bed mobility, CGA for transfers and ambulated 10' with Min A.  Pt with decreased balance with ambulation this date.  Unable to ambulate further secondary to fatigue.  Recommend SNF.      Anticipated Discharge Disposition (PT): skilled nursing facility  " Pt has met discharge criteria and states she is ready for discharge to home. IV removed, gauze and tape applied. Dressed in own clothes and personal belongings gathered. Discharge instructions (with opioid medication education information) given to pt and family; pt and family verbalized understanding of discharge instructions, prescriptions and follow up appointments. Pt transported to discharge lobby by [facility] staff.